# Patient Record
Sex: MALE | Race: WHITE
[De-identification: names, ages, dates, MRNs, and addresses within clinical notes are randomized per-mention and may not be internally consistent; named-entity substitution may affect disease eponyms.]

---

## 2017-06-07 ENCOUNTER — HOSPITAL ENCOUNTER (EMERGENCY)
Dept: HOSPITAL 23 - SED | Age: 47
Discharge: HOME | End: 2017-06-07
Payer: COMMERCIAL
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
## 2017-06-07 DIAGNOSIS — S70.01XA: Primary | ICD-10-CM

## 2017-06-07 DIAGNOSIS — F17.200: ICD-10-CM
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
## 2017-06-07 DIAGNOSIS — S20.219A: ICD-10-CM

## 2017-12-11 ENCOUNTER — LAB REQUISITION (OUTPATIENT)
Dept: LAB | Facility: OTHER | Age: 47
End: 2017-12-11

## 2017-12-11 ENCOUNTER — TRANSCRIBE ORDERS (OUTPATIENT)
Dept: CARDIOLOGY | Facility: CLINIC | Age: 47
End: 2017-12-11

## 2017-12-11 DIAGNOSIS — Z00.00 ROUTINE GENERAL MEDICAL EXAMINATION AT A HEALTH CARE FACILITY: ICD-10-CM

## 2017-12-11 DIAGNOSIS — Z00.00 PHYSICAL EXAM: Primary | ICD-10-CM

## 2017-12-11 LAB
ALBUMIN SERPL-MCNC: 4.4 G/DL (ref 3.5–5.2)
ALP SERPL-CCNC: 74 U/L (ref 39–117)
ALT SERPL W P-5'-P-CCNC: 30 U/L (ref 1–41)
AST SERPL-CCNC: 20 U/L (ref 1–40)
BASOPHILS # BLD AUTO: 0.01 10*3/MM3 (ref 0–0.2)
BASOPHILS NFR BLD AUTO: 0.1 % (ref 0–1.5)
BILIRUB CONJ SERPL-MCNC: <0.2 MG/DL (ref 0–0.3)
BILIRUB SERPL-MCNC: 1.4 MG/DL (ref 0.1–1.2)
BILIRUB UR QL STRIP: NEGATIVE
BUN BLD-MCNC: 15 MG/DL (ref 6–20)
CALCIUM SPEC-SCNC: 9.6 MG/DL (ref 8.6–10.5)
CHLORIDE SERPL-SCNC: 101 MMOL/L (ref 98–107)
CHOLEST SERPL-MCNC: 183 MG/DL (ref 0–200)
CLARITY UR: CLEAR
CO2 SERPL-SCNC: 23.4 MMOL/L (ref 22–29)
COLOR UR: ABNORMAL
CREAT BLD-MCNC: 1.05 MG/DL (ref 0.76–1.27)
DEPRECATED RDW RBC AUTO: 41.9 FL (ref 37–54)
EOSINOPHIL # BLD AUTO: 0.16 10*3/MM3 (ref 0–0.7)
EOSINOPHIL NFR BLD AUTO: 1.8 % (ref 0.3–6.2)
ERYTHROCYTE [DISTWIDTH] IN BLOOD BY AUTOMATED COUNT: 13.6 % (ref 11.5–14.5)
GFR SERPL CREATININE-BSD FRML MDRD: 75 ML/MIN/1.73
GFR SERPL CREATININE-BSD FRML MDRD: 91 ML/MIN/1.73
GGT SERPL-CCNC: 20 U/L (ref 8–61)
GLUCOSE BLD-MCNC: 104 MG/DL (ref 65–99)
GLUCOSE UR STRIP-MCNC: NEGATIVE MG/DL
HCT VFR BLD AUTO: 53.3 % (ref 40.4–52.2)
HDLC SERPL-MCNC: 36 MG/DL (ref 40–60)
HGB BLD-MCNC: 17.5 G/DL (ref 13.7–17.6)
HGB UR QL STRIP.AUTO: NEGATIVE
IMM GRANULOCYTES # BLD: 0.03 10*3/MM3 (ref 0–0.03)
IMM GRANULOCYTES NFR BLD: 0.3 % (ref 0–0.5)
IRON 24H UR-MRATE: 89 MCG/DL (ref 59–158)
KETONES UR QL STRIP: NEGATIVE
LDH SERPL-CCNC: 174 U/L (ref 135–225)
LDLC SERPL CALC-MCNC: 95 MG/DL (ref 0–100)
LDLC/HDLC SERPL: 2.63 {RATIO}
LEUKOCYTE ESTERASE UR QL STRIP.AUTO: NEGATIVE
LYMPHOCYTES # BLD AUTO: 2.56 10*3/MM3 (ref 0.9–4.8)
LYMPHOCYTES NFR BLD AUTO: 28.4 % (ref 19.6–45.3)
MCH RBC QN AUTO: 28.4 PG (ref 27–32.7)
MCHC RBC AUTO-ENTMCNC: 32.8 G/DL (ref 32.6–36.4)
MCV RBC AUTO: 86.5 FL (ref 79.8–96.2)
MONOCYTES # BLD AUTO: 0.79 10*3/MM3 (ref 0.2–1.2)
MONOCYTES NFR BLD AUTO: 8.8 % (ref 5–12)
NEUTROPHILS # BLD AUTO: 5.46 10*3/MM3 (ref 1.9–8.1)
NEUTROPHILS NFR BLD AUTO: 60.6 % (ref 42.7–76)
NITRITE UR QL STRIP: NEGATIVE
PH UR STRIP.AUTO: 6 [PH] (ref 5–8)
PHOSPHATE SERPL-MCNC: 3.1 MG/DL (ref 2.5–4.5)
PLATELET # BLD AUTO: 218 10*3/MM3 (ref 140–500)
PMV BLD AUTO: 10.8 FL (ref 6–12)
POTASSIUM BLD-SCNC: 4.3 MMOL/L (ref 3.5–5.2)
PROT SERPL-MCNC: 8 G/DL (ref 6–8.5)
PROT UR QL STRIP: NEGATIVE
RBC # BLD AUTO: 6.16 10*6/MM3 (ref 4.6–6)
SODIUM BLD-SCNC: 139 MMOL/L (ref 136–145)
SP GR UR STRIP: 1.02 (ref 1–1.03)
TRIGL SERPL-MCNC: 261 MG/DL (ref 0–150)
URATE SERPL-MCNC: 6.9 MG/DL (ref 3.4–7)
UROBILINOGEN UR QL STRIP: ABNORMAL
VLDLC SERPL-MCNC: 52.2 MG/DL (ref 5–40)
WBC NRBC COR # BLD: 9.01 10*3/MM3 (ref 4.5–10.7)

## 2017-12-11 PROCEDURE — 86481 TB AG RESPONSE T-CELL SUSP: CPT | Performed by: NURSE PRACTITIONER

## 2017-12-11 PROCEDURE — 81003 URINALYSIS AUTO W/O SCOPE: CPT | Performed by: NURSE PRACTITIONER

## 2017-12-11 PROCEDURE — 80061 LIPID PANEL: CPT | Performed by: NURSE PRACTITIONER

## 2017-12-11 PROCEDURE — 80053 COMPREHEN METABOLIC PANEL: CPT | Performed by: NURSE PRACTITIONER

## 2017-12-11 PROCEDURE — 85025 COMPLETE CBC W/AUTO DIFF WBC: CPT | Performed by: NURSE PRACTITIONER

## 2017-12-13 LAB
TSPOT INTERPRETATION: NEGATIVE
TSPOT NIL CONTROL: 0
TSPOT PANEL A: 0
TSPOT PANEL B: 0
TSPOT POS CONTROL: 174

## 2017-12-18 ENCOUNTER — HOSPITAL ENCOUNTER (OUTPATIENT)
Dept: CARDIOLOGY | Facility: HOSPITAL | Age: 47
Discharge: HOME | End: 2017-12-18

## 2017-12-18 DIAGNOSIS — Z00.00 PHYSICAL EXAM: ICD-10-CM

## 2017-12-18 LAB
BH CV STRESS BP STAGE 1: NORMAL
BH CV STRESS BP STAGE 2: NORMAL
BH CV STRESS BP STAGE 3: NORMAL
BH CV STRESS DURATION MIN STAGE 1: 3
BH CV STRESS DURATION MIN STAGE 2: 3
BH CV STRESS DURATION MIN STAGE 3: 3
BH CV STRESS DURATION SEC STAGE 1: 0
BH CV STRESS DURATION SEC STAGE 2: 0
BH CV STRESS DURATION SEC STAGE 3: 0
BH CV STRESS GRADE STAGE 1: 10
BH CV STRESS GRADE STAGE 2: 12
BH CV STRESS GRADE STAGE 3: 14
BH CV STRESS HR STAGE 1: 113
BH CV STRESS HR STAGE 2: 126
BH CV STRESS HR STAGE 3: 148
BH CV STRESS METS STAGE 1: 5
BH CV STRESS METS STAGE 2: 7.5
BH CV STRESS METS STAGE 3: 10
BH CV STRESS PROTOCOL 1: NORMAL
BH CV STRESS RECOVERY BP: NORMAL MMHG
BH CV STRESS RECOVERY HR: 98 BPM
BH CV STRESS SPEED STAGE 1: 1.7
BH CV STRESS SPEED STAGE 2: 2.5
BH CV STRESS SPEED STAGE 3: 3.4
BH CV STRESS STAGE 1: 1
BH CV STRESS STAGE 2: 2
BH CV STRESS STAGE 3: 3
MAXIMAL PREDICTED HEART RATE: 171 BPM
PERCENT MAX PREDICTED HR: 86.55 %
STRESS BASELINE BP: NORMAL MMHG
STRESS BASELINE HR: 86 BPM
STRESS PERCENT HR: 102 %
STRESS POST ESTIMATED WORKLOAD: 10 METS
STRESS POST EXERCISE DUR MIN: 9 MIN
STRESS POST EXERCISE DUR SEC: 0 SEC
STRESS POST PEAK BP: NORMAL MMHG
STRESS POST PEAK HR: 148 BPM
STRESS TARGET HR: 145 BPM

## 2017-12-18 PROCEDURE — 93017 CV STRESS TEST TRACING ONLY: CPT

## 2017-12-18 PROCEDURE — 93018 CV STRESS TEST I&R ONLY: CPT | Performed by: INTERNAL MEDICINE

## 2017-12-18 PROCEDURE — 93016 CV STRESS TEST SUPVJ ONLY: CPT | Performed by: INTERNAL MEDICINE

## 2018-01-09 ENCOUNTER — OFFICE VISIT (OUTPATIENT)
Dept: CARDIOLOGY | Facility: CLINIC | Age: 48
End: 2018-01-09

## 2018-01-09 VITALS
HEART RATE: 66 BPM | HEIGHT: 72 IN | SYSTOLIC BLOOD PRESSURE: 130 MMHG | WEIGHT: 277 LBS | BODY MASS INDEX: 37.52 KG/M2 | DIASTOLIC BLOOD PRESSURE: 82 MMHG

## 2018-01-09 DIAGNOSIS — I49.3 PVC'S (PREMATURE VENTRICULAR CONTRACTIONS): Primary | ICD-10-CM

## 2018-01-09 PROCEDURE — 99203 OFFICE O/P NEW LOW 30 MIN: CPT | Performed by: INTERNAL MEDICINE

## 2018-01-09 PROCEDURE — 93000 ELECTROCARDIOGRAM COMPLETE: CPT | Performed by: INTERNAL MEDICINE

## 2018-01-09 NOTE — PROGRESS NOTES
Subjective:     Encounter Date:01/09/2018      Patient ID: Jaime Lyle is a 47 y.o. male.    Chief Complaint:  History of Present Illness    Dear Dr. Bird,    I had the pleasure of seeing this patient in the office today for evaluation consultation.  I appreciate the you sent him to see us.    As you know he is a pleasant gentleman whom you arrange for a stress test as required for his duties as a .  He had a stress test performed December 18, 2017.  He comes in for follow-up.    Patient is typically followed with Dr. Price.  He sees me today as Dr. Price was not available.  He was last seen in October 2014 for multiple premature ventricular ectopic beats that were present at rest and also noted when he performed his treadmill in 2014.  These were asymptomatic.  No treatment was felt to be indicated.  It was recommended that he decrease the amount of caffeinated soft drinks that he drinks.  There is a discussion about possible sleep study, but it was subsequently decided that this was not necessary.    Patient denies any cardiac complaints.This patient denies any chest pain, pressure, tightness, squeezing, or heartburn.  This patient has not experienced any feeling of palpitations, tachycardia or heart racing and no presyncope or syncope.  There has not been any problems with dizziness or lightheadedness.  There has not been any orthopnea or PND, and no problems with lower extremity edema.  This patient denies any shortness of breath at rest or with activity and has not had any wheezing.  This patient has not had any problems with unexplained nausea or vomiting. The patient has continued to perform daily activities of living without any specific problem or change in the level of activity.  This patient has not been recently hospitalized for any reason.    This patient has no known cardiac history.  This patient has no history of coronary artery disease, congestive heart failure, rheumatic  fever, rheumatic heart disease, congenital heart disease or heart murmur.  This patient has never required invasive cardiovascular evaluation.    On his exercise treadmill test he walked for 9 minutes on a Patric protocol.  Maximum heart rate was 86% of the predicted maximal heart rate and he had no symptoms.  Frequent PVCs were seen.  This is unchanged from his prior stress test in 2014.    The following portions of the patient's history were reviewed and updated as appropriate: allergies, current medications, past family history, past medical history, past social history, past surgical history and problem list.    Past Medical History:   Diagnosis Date   • Acid reflux    • Community acquired pneumonia    • Cough    • Fatigue    • Mild mitral regurgitation    • STEVE (obstructive sleep apnea)    • Pulmonic regurgitation     trace   • PVC's (premature ventricular contractions)    • Tricuspid regurgitation     trace       Past Surgical History:   Procedure Laterality Date   • LAPAROSCOPIC GASTRIC BANDING     • ROTATOR CUFF REPAIR         Social History     Social History   • Marital status:      Spouse name: N/A   • Number of children: N/A   • Years of education: N/A     Occupational History   • Not on file.     Social History Main Topics   • Smoking status: Light Tobacco Smoker   • Smokeless tobacco: Not on file   • Alcohol use Yes      Comment: caffeine use   • Drug use: Not on file   • Sexual activity: Not on file     Other Topics Concern   • Not on file     Social History Narrative       Review of Systems   Constitution: Negative for chills, decreased appetite, fever and night sweats.   HENT: Negative for ear discharge, ear pain, hearing loss, nosebleeds and sore throat.    Eyes: Negative for blurred vision, double vision and pain.   Cardiovascular: Negative for cyanosis.   Respiratory: Negative for hemoptysis and sputum production.    Endocrine: Negative for cold intolerance and heat intolerance.  "  Hematologic/Lymphatic: Negative for adenopathy.   Skin: Negative for dry skin, itching, nail changes, rash and suspicious lesions.   Musculoskeletal: Negative for arthritis, gout, muscle cramps, muscle weakness, myalgias and neck pain.   Gastrointestinal: Negative for anorexia, bowel incontinence, constipation, diarrhea, dysphagia, hematemesis and jaundice.   Genitourinary: Negative for bladder incontinence, dysuria, flank pain, frequency, hematuria and nocturia.   Neurological: Negative for focal weakness, numbness, paresthesias and seizures.   Psychiatric/Behavioral: Negative for altered mental status, hallucinations, hypervigilance, suicidal ideas and thoughts of violence.   Allergic/Immunologic: Negative for persistent infections.         ECG 12 Lead  Date/Time: 1/9/2018 9:56 AM  Performed by: BRYAN FERNANDEZ III.  Authorized by: BRYAN FERNANDEZ III   Comparison: compared with previous ECG   Similar to previous ECG  Rhythm: sinus rhythm  Ectopy: PVCs  Rate: normal  Conduction: conduction normal  ST Segments: ST segments normal  T Waves: T waves normal  QRS axis: normal  Other: no other findings  Clinical impression: non-specific ECG               Objective:     Vitals:    01/09/18 0901   BP: 130/82   Pulse: 66   Weight: 126 kg (277 lb)   Height: 182.9 cm (72\")         Physical Exam   Constitutional: He is oriented to person, place, and time. He appears well-developed and well-nourished. No distress.   HENT:   Head: Normocephalic and atraumatic.   Nose: Nose normal.   Mouth/Throat: Oropharynx is clear and moist.   Eyes: Conjunctivae and EOM are normal. Pupils are equal, round, and reactive to light. Right eye exhibits no discharge. Left eye exhibits no discharge.   Neck: Normal range of motion. Neck supple. No tracheal deviation present. No thyromegaly present.   Cardiovascular: Normal rate, regular rhythm, S1 normal, S2 normal, normal heart sounds and normal pulses.  Exam reveals no S3.    Pulmonary/Chest: " Effort normal and breath sounds normal. No stridor. No respiratory distress. He exhibits no tenderness.   Abdominal: Soft. Bowel sounds are normal. He exhibits no distension and no mass. There is no tenderness. There is no rebound and no guarding.   Musculoskeletal: Normal range of motion. He exhibits no tenderness or deformity.   Lymphadenopathy:     He has no cervical adenopathy.   Neurological: He is alert and oriented to person, place, and time. He has normal reflexes.   Skin: Skin is warm and dry. No rash noted. He is not diaphoretic. No erythema.   Psychiatric: He has a normal mood and affect. Thought content normal.       Lab Review:             Performed        Assessment:          Diagnosis Plan   1. PVC's (premature ventricular contractions)  ECG 12 Lead          Plan:       This patient has chronic asymptomatic PVCs.  His stress test now looks no different than his one back in 2014.  He does not meet any indication for further evaluation or treatment.  Additionally, the patient is appropriate to continue to perform his duties as a  from a cardiac standpoint.    Thank you very much for allowing us to participate in the care of this pleasant patient.  Please don't hesitate to call if I can be of assistance in any way.    No current outpatient prescriptions on file.

## 2018-08-20 ENCOUNTER — LAB (OUTPATIENT)
Dept: LAB | Facility: HOSPITAL | Age: 48
End: 2018-08-20

## 2018-08-20 ENCOUNTER — OFFICE VISIT (OUTPATIENT)
Dept: CARDIOLOGY | Facility: CLINIC | Age: 48
End: 2018-08-20

## 2018-08-20 ENCOUNTER — TELEPHONE (OUTPATIENT)
Dept: CARDIOLOGY | Facility: CLINIC | Age: 48
End: 2018-08-20

## 2018-08-20 VITALS
BODY MASS INDEX: 37.17 KG/M2 | WEIGHT: 274.4 LBS | DIASTOLIC BLOOD PRESSURE: 84 MMHG | HEIGHT: 72 IN | HEART RATE: 59 BPM | SYSTOLIC BLOOD PRESSURE: 120 MMHG

## 2018-08-20 DIAGNOSIS — R00.2 PALPITATIONS: Primary | ICD-10-CM

## 2018-08-20 DIAGNOSIS — I49.3 PVC (PREMATURE VENTRICULAR CONTRACTION): ICD-10-CM

## 2018-08-20 DIAGNOSIS — F17.290 CIGAR SMOKER: ICD-10-CM

## 2018-08-20 DIAGNOSIS — R06.83 SNORING: ICD-10-CM

## 2018-08-20 DIAGNOSIS — E66.9 OBESITY (BMI 35.0-39.9 WITHOUT COMORBIDITY): ICD-10-CM

## 2018-08-20 DIAGNOSIS — R00.2 PALPITATIONS: ICD-10-CM

## 2018-08-20 LAB
ALBUMIN SERPL-MCNC: 4.1 G/DL (ref 3.5–5.2)
ALBUMIN/GLOB SERPL: 1.2 G/DL
ALP SERPL-CCNC: 69 U/L (ref 39–117)
ALT SERPL W P-5'-P-CCNC: 24 U/L (ref 1–41)
ANION GAP SERPL CALCULATED.3IONS-SCNC: 12 MMOL/L
AST SERPL-CCNC: 15 U/L (ref 1–40)
BASOPHILS # BLD AUTO: 0.02 10*3/MM3 (ref 0–0.2)
BASOPHILS NFR BLD AUTO: 0.2 % (ref 0–1.5)
BILIRUB SERPL-MCNC: 0.6 MG/DL (ref 0.1–1.2)
BUN BLD-MCNC: 13 MG/DL (ref 6–20)
BUN/CREAT SERPL: 13.8 (ref 7–25)
CALCIUM SPEC-SCNC: 9.7 MG/DL (ref 8.6–10.5)
CHLORIDE SERPL-SCNC: 105 MMOL/L (ref 98–107)
CO2 SERPL-SCNC: 23 MMOL/L (ref 22–29)
CREAT BLD-MCNC: 0.94 MG/DL (ref 0.76–1.27)
DEPRECATED RDW RBC AUTO: 41.2 FL (ref 37–54)
EOSINOPHIL # BLD AUTO: 0.17 10*3/MM3 (ref 0–0.7)
EOSINOPHIL NFR BLD AUTO: 2 % (ref 0.3–6.2)
ERYTHROCYTE [DISTWIDTH] IN BLOOD BY AUTOMATED COUNT: 13.2 % (ref 11.5–14.5)
GFR SERPL CREATININE-BSD FRML MDRD: 85 ML/MIN/1.73
GLOBULIN UR ELPH-MCNC: 3.3 GM/DL
GLUCOSE BLD-MCNC: 98 MG/DL (ref 65–99)
HCT VFR BLD AUTO: 51.3 % (ref 40.4–52.2)
HGB BLD-MCNC: 16.6 G/DL (ref 13.7–17.6)
IMM GRANULOCYTES # BLD: 0.05 10*3/MM3 (ref 0–0.03)
IMM GRANULOCYTES NFR BLD: 0.6 % (ref 0–0.5)
LYMPHOCYTES # BLD AUTO: 2.92 10*3/MM3 (ref 0.9–4.8)
LYMPHOCYTES NFR BLD AUTO: 34.4 % (ref 19.6–45.3)
MAGNESIUM SERPL-MCNC: 2.4 MG/DL (ref 1.6–2.6)
MCH RBC QN AUTO: 27.8 PG (ref 27–32.7)
MCHC RBC AUTO-ENTMCNC: 32.4 G/DL (ref 32.6–36.4)
MCV RBC AUTO: 85.8 FL (ref 79.8–96.2)
MONOCYTES # BLD AUTO: 0.64 10*3/MM3 (ref 0.2–1.2)
MONOCYTES NFR BLD AUTO: 7.5 % (ref 5–12)
NEUTROPHILS # BLD AUTO: 4.68 10*3/MM3 (ref 1.9–8.1)
NEUTROPHILS NFR BLD AUTO: 55.3 % (ref 42.7–76)
PLATELET # BLD AUTO: 216 10*3/MM3 (ref 140–500)
PMV BLD AUTO: 9.9 FL (ref 6–12)
POTASSIUM BLD-SCNC: 4.5 MMOL/L (ref 3.5–5.2)
PROT SERPL-MCNC: 7.4 G/DL (ref 6–8.5)
RBC # BLD AUTO: 5.98 10*6/MM3 (ref 4.6–6)
SODIUM BLD-SCNC: 140 MMOL/L (ref 136–145)
T4 FREE SERPL-MCNC: 1.38 NG/DL (ref 0.93–1.7)
TSH SERPL DL<=0.05 MIU/L-ACNC: 1.79 MIU/ML (ref 0.27–4.2)
WBC NRBC COR # BLD: 8.48 10*3/MM3 (ref 4.5–10.7)

## 2018-08-20 PROCEDURE — 84443 ASSAY THYROID STIM HORMONE: CPT

## 2018-08-20 PROCEDURE — 36415 COLL VENOUS BLD VENIPUNCTURE: CPT

## 2018-08-20 PROCEDURE — 83735 ASSAY OF MAGNESIUM: CPT

## 2018-08-20 PROCEDURE — 84439 ASSAY OF FREE THYROXINE: CPT

## 2018-08-20 PROCEDURE — 85025 COMPLETE CBC W/AUTO DIFF WBC: CPT

## 2018-08-20 PROCEDURE — 99214 OFFICE O/P EST MOD 30 MIN: CPT | Performed by: NURSE PRACTITIONER

## 2018-08-20 PROCEDURE — 93000 ELECTROCARDIOGRAM COMPLETE: CPT | Performed by: NURSE PRACTITIONER

## 2018-08-20 PROCEDURE — 80053 COMPREHEN METABOLIC PANEL: CPT

## 2018-08-20 NOTE — TELEPHONE ENCOUNTER
TSH, free T4, magnesium, CMP are normal.  CBC showed normal hemoglobin, hematocrit, WBC, RBC, platelet.     Patient informed & verbalizes understanding.

## 2018-08-20 NOTE — PROGRESS NOTES
Date of Office Visit: 2018  Encounter Provider: RIKKI Mehta  Place of Service: [facility]  Patient Name: Jaime Lyle  :1970    Chief Complaint   Patient presents with   • Palpitations   :     HPI: Jaime Lyle is a 47 y.o. male who presents today for cardiac follow up. He is a new patient to me and his previous records have been reviewed.  He has a past history of palpitations, PVCs, obesity, and obstructive sleep apnea.  He denies a history of coronary artery disease, heart failure, rheumatic fever, diabetes, hypertension, or hyperlipidemia.    He is an established patient of Dr. Issac Price and was last seen in the office by Dr. Vince Oconnell because Dr. Price was out that day.  He was initially diagnosed with PVCs in 2014 during treadmill stress test and he was asymptomatic.  He was a  so he does have stress test completed and his last one was in 2017. The treadmill showed no evidence of myocardial ischemia although he did have frequent monofocal premature ventricular contractions, bigeminy, and couplets during and after exercise.    Mr. Lyle presents today with a chief concern of worsening palpitations over the past 3 weeks.  His palpitations occur daily and he describes as skipped heartbeats with accompanying fatigue, shortness of breath, and dizziness.  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  He drinks approximately 3 sodas per day, feels that he keeps himself well-hydrated with water, denies a history of thyroid disease, rarely uses a decongestant, and drinks alcohol once every 2 weeks. He denies any increased stress in his personal life.  He does smoke a cigar once monthly.  He does experience occasional shortness of breath, cough, and snoring.  He has not been tested for sleep apnea in over 10 years.  He denies chest  pain, PND, orthopnea, or syncope.  His wife states that he does have occasional lower extremity edema if he stands on his feet for long time.  His blood pressure and heart rate are both normal.    The following portion of the patient's history were reviewed and updated as appropriate: past medical history, past surgical history, past social history, past family history, allergies, current medications, and problem list.    Past Medical History:   Diagnosis Date   • Acid reflux    • Community acquired pneumonia    • Mild mitral regurgitation    • Obesity (BMI 35.0-39.9 without comorbidity)    • STEVE (obstructive sleep apnea)    • Pulmonic regurgitation     trace   • PVC's (premature ventricular contractions)    • Tricuspid regurgitation     trace       Past Surgical History:   Procedure Laterality Date   • LAPAROSCOPIC GASTRIC BANDING     • ROTATOR CUFF REPAIR         Social History     Social History   • Marital status:      Spouse name: N/A   • Number of children: N/A   • Years of education: N/A     Occupational History   • Not on file.     Social History Main Topics   • Smoking status: Light Tobacco Smoker     Types: Cigars   • Smokeless tobacco: Never Used      Comment: 1 cigar monthly   • Alcohol use Yes      Comment: caffeine use   • Drug use: Unknown   • Sexual activity: Not on file       Family History   Problem Relation Age of Onset   • Heart disease Mother 50   • Diabetes Father    • Sudden death Maternal Uncle 52   • Stroke Maternal Grandmother 50   • Diabetes Paternal Grandmother    • Diabetes Paternal Grandfather    • Hypertension Other    • Heart disease Other         ischemic   • Diabetes Other        Review of Systems   Constitution: Negative for chills, diaphoresis, fever, malaise/fatigue, night sweats, weight gain and weight loss.   HENT: Negative for hearing loss, nosebleeds, sore throat and tinnitus.    Eyes: Negative for blurred vision, double vision, pain and visual disturbance.  "  Cardiovascular: Positive for palpitations. Negative for chest pain, claudication, cyanosis, irregular heartbeat, leg swelling, near-syncope, orthopnea, paroxysmal nocturnal dyspnea and syncope.   Respiratory: Positive for cough and snoring. Negative for hemoptysis, shortness of breath and wheezing.    Endocrine: Negative for cold intolerance, heat intolerance and polyuria.   Hematologic/Lymphatic: Negative for bleeding problem. Does not bruise/bleed easily.   Skin: Negative for color change, dry skin, flushing and itching.   Musculoskeletal: Negative for falls, joint pain, joint swelling, muscle cramps, muscle weakness and myalgias.   Gastrointestinal: Negative for abdominal pain, constipation, heartburn, melena, nausea and vomiting.   Genitourinary: Negative for dysuria and hematuria.   Neurological: Positive for dizziness. Negative for excessive daytime sleepiness, light-headedness, loss of balance, numbness, paresthesias, seizures and vertigo.   Psychiatric/Behavioral: Negative for altered mental status, depression, memory loss and substance abuse. The patient does not have insomnia and is not nervous/anxious.    Allergic/Immunologic: Negative for environmental allergies.       No Known Allergies      Current Outpatient Prescriptions:   •  Aspirin-Acetaminophen-Caffeine (GOODY HEADACHE PO), Take  by mouth As Needed., Disp: , Rfl:       Objective:     Vitals:    08/20/18 0842   BP: 120/84   Pulse: 59   Weight: 124 kg (274 lb 6.4 oz)   Height: 182.9 cm (72\")     Body mass index is 37.22 kg/m².    PHYSICAL EXAM:    Vitals Reviewed.   General Appearance: No acute distress, well developed and well nourished. Obese.   Eyes: Conjunctiva and lids: No erythema, swelling, or discharge. Sclera non-icteric.   HENT: Atraumatic, normocephalic. External eyes, ears, and nose normal. No hearing loss noted. Mucous membranes normal. Lips not cyanotic. Neck supple with no tenderness.  Respiratory: No signs of respiratory distress. " Respiration rhythm and depth normal.   Clear to auscultation. No rales, crackles, rhonchi, or wheezing auscultated.   Cardiovascular:  Jugular Venous Pressure: Normal  Heart Rate and Rhythm: Normal rhythm with ectopy.  Heart Sounds: Normal S1 and S2. No S3 or S4 noted.  Murmurs: No murmurs noted. No rubs, thrills, or gallops.   Arterial Pulses: Carotid pulses normal. No carotid bruit noted. Posterior tibialis and dorsalis pedis pulses normal.   Lower Extremities: No edema noted.  Gastrointestinal:  Abdomen soft, non-distended, non-tender. Normal bowel sounds. No hepatomegaly.   Musculoskeletal: Normal movement of extremities  Skin and Nails: General appearance normal. No pallor, cyanosis, diaphoresis. Skin temperature normal. No clubbing of fingernails.   Psychiatric: Patient alert and oriented to person, place, and time. Speech and behavior appropriate. Normal mood and affect.       ECG 12 Lead  Date/Time: 8/20/2018 8:36 AM  Performed by: RONAK RIVERA  Authorized by: RONAK RIVERA   Comparison: compared with previous ECG from 1/9/2018  Similar to previous ECG  Rhythm: sinus rhythm  Rate: bradycardic  BPM: 59  Conduction: conduction normal  ST Segments: ST segments normal  QRS axis: normal  Clinical impression: abnormal ECG  Comments: T wave flattening               Assessment:       Diagnosis Plan   1. Palpitations  Holter Monitor - 24 Hour    Comprehensive Metabolic Panel    CBC Auto Differential    Magnesium    TSH    T4, Free    Ambulatory Referral to Sleep Medicine   2. PVC (premature ventricular contraction)  Holter Monitor - 24 Hour    Comprehensive Metabolic Panel    CBC Auto Differential    Magnesium    TSH    T4, Free    Ambulatory Referral to Sleep Medicine   3. Obesity (BMI 35.0-39.9 without comorbidity)     4. Snoring  Ambulatory Referral to Sleep Medicine   5. Cigar smoker            Plan:       1.  Palpitations: He has a history of frequent PVCs is been asymptomatic in the past.  Over the past  3 weeks she's been symptomatic with palpitations accompanied with fatigue, shortness of breath, and dizziness.  I auscultated his heart and heard extra heartbeats, but they were not noted on the EKG.  He is going to wear a 24-hour Holter monitor to identify exactly what he is feeling and quantify the amount of possible PVCs.  We did talk about lifestyle modifications such as regular exercise, weight loss, and decreasing his caffeinated beverage intake.  We will check a CBC, CMP, magnesium, TSH, and free T4.  We did discuss the possibility of starting a low-dose beta blocker if his palpitations continue.    2.  Snoring: He does snore at nighttime and I'm suspicious of possible sleep apnea.  I placed a referral to sleep medicine.    3.  Obesity: He has had gastric bypass surgery in the past and last 140 pounds.  His BMI today is 37.2.  I did explain how weight loss and regular exercise can continue to help the PVCs.    4.  Cigar smoking: The patient benefit from same from cigar smoking.    5.  Further recommendations to follow after review of test results.    Addendum 10/2/2018: Patient has declined wearing the 24-hour Holter monitor as previously recommended.    As always, it has been a pleasure to participate in your patient's care.      Sincerely,         RIKKI Colin

## 2018-08-23 ENCOUNTER — APPOINTMENT (OUTPATIENT)
Dept: CARDIOLOGY | Facility: HOSPITAL | Age: 48
End: 2018-08-23

## 2018-09-06 ENCOUNTER — TELEPHONE (OUTPATIENT)
Dept: CARDIOLOGY | Facility: CLINIC | Age: 48
End: 2018-09-06

## 2018-09-06 NOTE — TELEPHONE ENCOUNTER
Please call patient to reschedule Holter monitor.  I spoke with him and he would like to have it done.  Thank you

## 2018-09-06 NOTE — TELEPHONE ENCOUNTER
----- Message from RIKKI Garay sent at 9/6/2018  5:10 PM EDT -----    Patient was both a no show and cancelled holter     ----- Message -----  From: Kim Rothman APRN  Sent: 8/29/2018   4:48 PM  To: RIKKI Garay      Patient rescheduled for 8/31    ----- Message -----  From: Kim Rothman APRN  Sent: 8/20/2018   9:44 AM  To: RIKKI Garay      Follow up on holter monitor 8/23

## 2018-09-07 NOTE — TELEPHONE ENCOUNTER
I s/w pt - he is currently away from his desk, once he gets back he is going to call me to schedule.    Thank you  Chino MAS

## 2018-09-18 ENCOUNTER — OFFICE VISIT (OUTPATIENT)
Dept: SLEEP MEDICINE | Facility: HOSPITAL | Age: 48
End: 2018-09-18
Attending: INTERNAL MEDICINE

## 2018-09-18 VITALS
HEART RATE: 55 BPM | HEIGHT: 72 IN | WEIGHT: 274 LBS | BODY MASS INDEX: 37.11 KG/M2 | SYSTOLIC BLOOD PRESSURE: 136 MMHG | OXYGEN SATURATION: 97 % | DIASTOLIC BLOOD PRESSURE: 79 MMHG

## 2018-09-18 DIAGNOSIS — I49.3 FREQUENT PVCS: ICD-10-CM

## 2018-09-18 DIAGNOSIS — G47.33 OSA (OBSTRUCTIVE SLEEP APNEA): ICD-10-CM

## 2018-09-18 DIAGNOSIS — E66.9 OBESITY (BMI 35.0-39.9 WITHOUT COMORBIDITY): ICD-10-CM

## 2018-09-18 DIAGNOSIS — R06.83 SNORING: Primary | ICD-10-CM

## 2018-09-18 PROCEDURE — G0463 HOSPITAL OUTPT CLINIC VISIT: HCPCS

## 2018-09-18 NOTE — PROGRESS NOTES
[facility]      Patient Care Team:  Provider, No Known as PCP - General    Referring Provider: Kim Rothman APRN    Chief complaint:   Referred for evaluation of sleep apnea due to frequent PVC    History of present illness:  This is a 48-year-old male patient with a diagnosis of frequent symptomatic PVC.  He follows with cardiology and was seen by Dr. Oconnell.  He is currently not receiving any medical treatment but was told to cut down on caffeine intake.  He was referred to us for evaluation of sleep apnea.    Patient stated that he had a sleep study back in  as workup for gastric banding.  He does not recall of the sleep test showed but he does not think that he was diagnosed with sleep apnea as he was not started on treatment.  We do not have access to this test.      He did endorse loud snoring which disturb his wife but does not awaken him at night however he stated that he sometimes wakes up coughing and choking and this occurs about once a week.  He also wakes up with a dry mouth.  No reports of witnessed apnea.  He described his sleep as somewhat restless with frequent leg jerking but denies symptoms of restless leg syndrome.    Sleep schedule:  -Bedtime: Midnight to 2 AM  -Sleep latency: 30-60 minutes  -Wake up time: 6:30 to 7 AM.  He feels tired on some days and rested on other days.  -Nocturnal awakenin-2 times because of nocturia.  No difficulties going back to sleep.  -Perceived sleep hours: 4-5    ESS: Total score: 4     He stated that both his mother other and father have sleep apnea and use CPAP.    Apparently, patient underwent gastric banding and ended up losing about 100 pounds over he stated that the band slipped off.  He stated that he tries to eat healthy diet and also tries to walk about 3 minus a day.    Review of Systems  Constitutional: No fever or chills. No changes in appetite.   ENMT: Nasal congestion but no postnasal drip,  hoarsness  Cardiovascular: No chest pain or legs swelling but  palpitation  Respiratory: No dyspnea or wheezing but cough  Gastrointestinal: No constipation, diarrhea, abdominal pain or acid reflux  Neurology: No headache, weakness, numbness but dizziness.   Musculoskeletal: Joints pain and swelling.   Psychiatry: No depression, anxiety or irritability.   Hem/Lymphatic: No swollen glands or easy bruising.  Integumentary: No rash.  Endocrinology: No excessive thirst, cold or warm intolerance.   Urinary: No dysuria, bloody urine or frequent urination.     History  Past Medical History:   Diagnosis Date   • Acid reflux    • Community acquired pneumonia    • Mild mitral regurgitation    • Obesity (BMI 35.0-39.9 without comorbidity)    • STEVE (obstructive sleep apnea)    • Pulmonic regurgitation     trace   • PVC's (premature ventricular contractions)    • Tricuspid regurgitation     trace   ,   Past Surgical History:   Procedure Laterality Date   • LAPAROSCOPIC GASTRIC BANDING     • ROTATOR CUFF REPAIR     ,   Family History   Problem Relation Age of Onset   • Heart disease Mother 50   • Diabetes Father    • Sudden death Maternal Uncle 52   • Stroke Maternal Grandmother 50   • Diabetes Paternal Grandmother    • Diabetes Paternal Grandfather    • Hypertension Other    • Heart disease Other         ischemic   • Diabetes Other    ,   Social History   Substance Use Topics   • Smoking status: Light Tobacco Smoker     Types: Cigars   • Smokeless tobacco: Never Used      Comment: 1 cigar monthly   • Alcohol use Yes      Comment: caffeine use    and Allergies:  Patient has no known allergies.    Medications:    Current Outpatient Prescriptions:   •  Aspirin-Acetaminophen-Caffeine (GOODY HEADACHE PO), Take  by mouth As Needed., Disp: , Rfl:       Vital Signs:  Vitals:    09/18/18 1449   BP: 136/79   BP Location: Left arm   Patient Position: Sitting   Cuff Size: Adult   Pulse: 55   SpO2: 97%   Weight: 124 kg (274 lb)   Height:  "182.9 cm (72\")     Body mass index is 37.16 kg/m².  Neck Circumference: 18 inches     Physical Exam:  Neck Circumference: 18 inches     Constitutional: Not in acute distress.  Eyes: Injected conjunctiva, EOMI.  ENMT: Palmer score 3. Mallampati score 3. No oral thrush. Tonsils grade . Large tongue.  Neck: Large. No thyromegaly.    Heart: Regular rhythm and rate, no murmur  Lungs: Good and equal air entry bilaterally. No crackles or wheezing.         Abdomen: Obese. Soft.  No tenderness  Extremities: No cyanosis, clubbing or pitting edema. Moves all extremities.  Neuro: Conscious, alert, oriented x3.   Psych: Appropriate mood and affect.    Integumentary: No rash  Lymphatic: No palpable cervical or supraclavicular lymph nodes.    Diagnostic data:  Echocardiogram on 11/5/20: EF = 66%.       Assessment:  Diagnoses and all orders for this visit:    Snoring    Obesity (BMI 35.0-39.9 without comorbidity)    Frequent PVCs        Plan:  Despite the lack of significant symptoms, patient remains at risk of obstructive sleep apnea due to his obesity and upper airway anatomy.    I explained the pathophysiology of sleep apnea, testing and therapy which mainly include CPAP and weight loss.  Patient is a good candidate for auto CPAP therapy.  I will obtain a home sleep apnea testing.      We discussed the association between obstructive sleep apnea and cardiovascular disease including arrhythmia and the beneficial effect of CPAP therapy and cardiovascular disease.    I counseled the patient for weight loss.  I informed him that losing weight he decrease the severity of sleep apnea in obvious need of CPAP therapy        Sean Sales MD  09/18/18  3:22 PM    This note was dictated utilizing Dragon dictation          "

## 2018-10-02 ENCOUNTER — TELEPHONE (OUTPATIENT)
Dept: CARDIOLOGY | Facility: CLINIC | Age: 48
End: 2018-10-02

## 2018-10-02 NOTE — TELEPHONE ENCOUNTER
----- Message from Chino Finch sent at 10/2/2018  8:35 AM EDT -----  I s/w patient, he does not want to proceed.     Thank you  Chino MAS  ----- Message -----  From: Kim Rothman APRN  Sent: 10/1/2018   4:06 PM  To: Chino Finch      Please call patient and schedule Holter monitor.  If he doesn't want to have it done, I will cancel the order.

## 2018-10-16 ENCOUNTER — HOSPITAL ENCOUNTER (OUTPATIENT)
Dept: SLEEP MEDICINE | Facility: HOSPITAL | Age: 48
Discharge: HOME | End: 2018-10-16
Attending: INTERNAL MEDICINE | Admitting: INTERNAL MEDICINE

## 2018-10-16 DIAGNOSIS — G47.33 OSA (OBSTRUCTIVE SLEEP APNEA): ICD-10-CM

## 2018-10-16 PROCEDURE — 95806 SLEEP STUDY UNATT&RESP EFFT: CPT

## 2018-10-29 ENCOUNTER — TELEPHONE (OUTPATIENT)
Dept: SLEEP MEDICINE | Facility: HOSPITAL | Age: 48
End: 2018-10-29

## 2018-10-29 NOTE — TELEPHONE ENCOUNTER
Pt called back to discuss sleep study results.  Patient voiced understanding of HST results.  Pt is currently out of town for next 2 weeks.  Pt will CB to make f/up appt with Dr. Sales. [place]

## 2019-01-15 ENCOUNTER — LAB REQUISITION (OUTPATIENT)
Dept: LAB | Facility: OTHER | Age: 49
End: 2019-01-15

## 2019-01-15 DIAGNOSIS — Z00.00 ROUTINE GENERAL MEDICAL EXAMINATION AT A HEALTH CARE FACILITY: ICD-10-CM

## 2019-01-15 LAB
ALBUMIN SERPL-MCNC: 4 G/DL (ref 3.5–5.2)
ALP SERPL-CCNC: 65 U/L (ref 39–117)
ALT SERPL W P-5'-P-CCNC: 21 U/L (ref 1–41)
AST SERPL-CCNC: 17 U/L (ref 1–40)
BASOPHILS # BLD AUTO: 0.02 10*3/MM3 (ref 0–0.2)
BASOPHILS NFR BLD AUTO: 0.2 % (ref 0–1.5)
BILIRUB CONJ SERPL-MCNC: <0.2 MG/DL (ref 0–0.3)
BILIRUB SERPL-MCNC: 0.8 MG/DL (ref 0.1–1.2)
BILIRUB UR QL STRIP: NEGATIVE
BUN BLD-MCNC: 12 MG/DL (ref 6–20)
CALCIUM SPEC-SCNC: 10 MG/DL (ref 8.6–10.5)
CHLORIDE SERPL-SCNC: 102 MMOL/L (ref 98–107)
CHOLEST SERPL-MCNC: 179 MG/DL (ref 0–200)
CLARITY UR: CLEAR
CO2 SERPL-SCNC: 24.6 MMOL/L (ref 22–29)
COLOR UR: YELLOW
CREAT BLD-MCNC: 1.07 MG/DL (ref 0.76–1.27)
DEPRECATED RDW RBC AUTO: 42.6 FL (ref 37–54)
EOSINOPHIL # BLD AUTO: 0.22 10*3/MM3 (ref 0–0.7)
EOSINOPHIL NFR BLD AUTO: 2.4 % (ref 0.3–6.2)
ERYTHROCYTE [DISTWIDTH] IN BLOOD BY AUTOMATED COUNT: 13.9 % (ref 11.5–14.5)
GFR SERPL CREATININE-BSD FRML MDRD: 73 ML/MIN/1.73
GGT SERPL-CCNC: 20 U/L (ref 8–61)
GLUCOSE BLD-MCNC: 99 MG/DL (ref 65–99)
GLUCOSE UR STRIP-MCNC: NEGATIVE MG/DL
HCT VFR BLD AUTO: 51.4 % (ref 40.4–52.2)
HDLC SERPL-MCNC: 35 MG/DL (ref 40–60)
HGB BLD-MCNC: 17.2 G/DL (ref 13.7–17.6)
HGB UR QL STRIP.AUTO: NEGATIVE
IMM GRANULOCYTES # BLD AUTO: 0.04 10*3/MM3 (ref 0–0.03)
IMM GRANULOCYTES NFR BLD AUTO: 0.4 % (ref 0–0.5)
IRON 24H UR-MRATE: 60 MCG/DL (ref 59–158)
KETONES UR QL STRIP: NEGATIVE
LDH SERPL-CCNC: 224 U/L (ref 135–225)
LDLC SERPL CALC-MCNC: 101 MG/DL (ref 0–100)
LDLC/HDLC SERPL: 2.88 {RATIO}
LEUKOCYTE ESTERASE UR QL STRIP.AUTO: NEGATIVE
LYMPHOCYTES # BLD AUTO: 3.23 10*3/MM3 (ref 0.9–4.8)
LYMPHOCYTES NFR BLD AUTO: 34.9 % (ref 19.6–45.3)
MCH RBC QN AUTO: 28.6 PG (ref 27–32.7)
MCHC RBC AUTO-ENTMCNC: 33.5 G/DL (ref 32.6–36.4)
MCV RBC AUTO: 85.5 FL (ref 79.8–96.2)
MONOCYTES # BLD AUTO: 0.51 10*3/MM3 (ref 0.2–1.2)
MONOCYTES NFR BLD AUTO: 5.5 % (ref 5–12)
NEUTROPHILS # BLD AUTO: 5.28 10*3/MM3 (ref 1.9–8.1)
NEUTROPHILS NFR BLD AUTO: 57 % (ref 42.7–76)
NITRITE UR QL STRIP: NEGATIVE
PH UR STRIP.AUTO: 5.5 [PH] (ref 5–8)
PHOSPHATE SERPL-MCNC: 3.2 MG/DL (ref 2.5–4.5)
PLATELET # BLD AUTO: 228 10*3/MM3 (ref 140–500)
PMV BLD AUTO: 10.9 FL (ref 6–12)
POTASSIUM BLD-SCNC: 4.3 MMOL/L (ref 3.5–5.2)
PROT SERPL-MCNC: 7.7 G/DL (ref 6–8.5)
PROT UR QL STRIP: NEGATIVE
RBC # BLD AUTO: 6.01 10*6/MM3 (ref 4.6–6)
SODIUM BLD-SCNC: 139 MMOL/L (ref 136–145)
SP GR UR STRIP: 1.02 (ref 1–1.03)
TRIGL SERPL-MCNC: 216 MG/DL (ref 0–150)
URATE SERPL-MCNC: 6.7 MG/DL (ref 3.4–7)
UROBILINOGEN UR QL STRIP: NORMAL
VLDLC SERPL-MCNC: 43.2 MG/DL (ref 5–40)
WBC NRBC COR # BLD: 9.26 10*3/MM3 (ref 4.5–10.7)

## 2019-01-15 PROCEDURE — 85025 COMPLETE CBC W/AUTO DIFF WBC: CPT | Performed by: PHYSICAL MEDICINE & REHABILITATION

## 2019-01-15 PROCEDURE — 81003 URINALYSIS AUTO W/O SCOPE: CPT | Performed by: PHYSICAL MEDICINE & REHABILITATION

## 2019-01-15 PROCEDURE — 80053 COMPREHEN METABOLIC PANEL: CPT | Performed by: PHYSICAL MEDICINE & REHABILITATION

## 2019-01-15 PROCEDURE — 80061 LIPID PANEL: CPT | Performed by: PHYSICAL MEDICINE & REHABILITATION

## 2019-01-15 PROCEDURE — 86481 TB AG RESPONSE T-CELL SUSP: CPT | Performed by: PHYSICAL MEDICINE & REHABILITATION

## 2019-01-17 LAB
TSPOT INTERPRETATION: NEGATIVE
TSPOT NIL CONTROL INTERPRETATION: NORMAL
TSPOT PANEL A: 0
TSPOT PANEL B: 0
TSPOT POS CONTROL INTERPRETATION: NORMAL

## 2019-05-23 ENCOUNTER — CONVERSION ENCOUNTER (OUTPATIENT)
Dept: OTHER | Facility: HOSPITAL | Age: 49
End: 2019-05-23

## 2019-05-31 ENCOUNTER — HOSPITAL ENCOUNTER (OUTPATIENT)
Dept: GENERAL RADIOLOGY | Facility: HOSPITAL | Age: 49
Discharge: HOME | End: 2019-05-31
Attending: FAMILY MEDICINE | Admitting: FAMILY MEDICINE

## 2019-06-04 VITALS
HEIGHT: 71 IN | BODY MASS INDEX: 38.22 KG/M2 | OXYGEN SATURATION: 96 % | SYSTOLIC BLOOD PRESSURE: 137 MMHG | HEART RATE: 62 BPM | DIASTOLIC BLOOD PRESSURE: 86 MMHG | WEIGHT: 273 LBS

## 2019-06-06 NOTE — PROGRESS NOTES
CC:  respiratory issues .    History of Present Illness:  Pt in w 3-4 month Hx  chronic cough and dyspnea. Non-smoker, but has Hx of significant GERD w Lap Band in place. ? Slipped.  Occ Dysphagia.      Vital Signs:    Patient Profile:    48 Years Old Male  Height:     71 inches  Weight:     273 pounds  BMI:        38.07     O2 Sat:     96 %  Temp:       98.2 degrees F oral  Pulse rate: 62 / minute  BP Sittin / 86  (left arm)    Cuff size:  large      Problems: Active problems were reviewed with the patient during this visit.  Medications: Medications were reviewed with the patient during this visit.  Allergies: Allergies were reviewed with the patient during this visit.  No Known Allergy.        Vitals Entered By: Karine Luna CMA (May 23, 2019 9:38 AM)      Active Medications (reviewed today):  GOODYS EXTRA STRENGTH 500-325-65 MG ORAL PACKET (ASPIRIN-ACETAMINOPHEN-CAFFEINE) prn  PEPCID COMPLETE -165 MG ORAL TABLET CHEWABLE (FAMOTIDINE-CA CARB-MAG HYDROX) prn    Current Allergies (reviewed today):  No known allergies          Risk Factors:     Smoked Tobacco Use:  Never smoker  Smokeless Tobacco Use:  Never  Passive smoke exposure:  no  Drug use:  no  HIV high-risk behavior:  no  Caffeine use:  3 drinks per day  Alcohol use:  yes     Type:  3-4 drinks per month   Exercise:  no  Seatbelt use:  100 %    Previous Tobacco Use: Signed On 2019  Smoked Tobacco Use:  Never smoker  Smokeless Tobacco Use:  Never  Passive smoke exposure:  no  Drug use:  no  HIV high-risk behavior:  no  Caffeine use:  3 drinks per day    Previous Alcohol Use: Signed On 2019  Alcohol use:  yes     Type:  3-4 drinks per month   Exercise:  no  Seatbelt use:  100 %        Review of Systems     General       Denies fever, chills, sweats, anorexia, fatigue, weakness, malaise, weight loss, weight gain and sleep disorder.       Chronic Obesity.    CV       Denies difficulty breathing at night, near fainting, chest pain  or discomfort, racing/skipping heart beats, fatigue, lightheadedness, shortness of breath with exertion, palpitations, swelling of hands or feet, difficulty breathing while lying down,   fainting, leg cramps with exertion, bluish discoloration of lips or nails, weight gain, leg cramps, leg pain, varicose veins, paroxysmal nocturnal dyspnea and bluish or purplish discoloration of hands/feet.    Resp       See HPI       Complains of cough and chest discomfort.    GI       See HPI       Denies excessive appetite, loss of appetite, indigestion, vomiting blood, nausea, vomiting, yellowish skin color, gas, abdominal pain, abdominal bloating, hemorrhoids, diarrhea, change in bowel habits, constipation, dark tarry stools, bloody stools,   abdominal mass, abdominal swelling, food intolerance, early satiety, stool incontinence, laxative use, odynophagia, painful defecation, need for antacids and blood after wiping.       Hx of GERD.           Denies dysuria, hematuria, discharge, urinary frequency, urinary hesitancy, nocturia, incontinence, genital sores, decreased libido, erectile dysfunction, Change in bladder habits, Change in urinary stream, Erection at night, Flank Pain, Testicular   Mass, Testicular Pain, Urgency, Retention, Kidney Stones, Difficulty Stopping Stream, Hx of STD and Recurrent UTI's.    Endo       Denies excessive hunger, cold intolerance, heat intolerance, excessive urination, excessive    thirst, weight change, appetite changes, excessive sweating, hair changes, hot flashes, libido change, change in body hair and Hypoglycemic episodes.    Heme       Denies enlarged lymph nodes, bleeding, skin discoloration, abnormal bruising, fevers and nose bleeds.      Physical Exam    General:      Mod Obesity, NAD.  Mouth:      no deformity or lesions with good dentition.  Oropharynx clear.  Neck:      no masses, thyromegaly, or abnormal cervical nodes.    Chest Wall:      no deformities or breast masses noted.     Lungs:      clear bilaterally to auscultation.    Heart:      non-displaced PMI, chest non-tender; regular rate and rhythm, S1, S2 without murmurs, rubs, or gallops  Abdomen:      Obese, soft, nontender, no bruits,  normal bowel sounds; no hepatosplenomegaly no ventral,umbilical hernias or masses noted.        Blood Pressure:  Today's BP: 137/86 mm Hg          Impression & Recommendations:    Problem # 1:  Dyspnea (ICD-786.09) (MBZ71-O83.00)  Assessment: Deteriorated    Orders:  XR CHEST 2 VIEWS (STANDARD (CPT-86423)      Problem # 2:  Cough chronic (ICD-786.2) (HKS81-B35)  Assessment: Unchanged    Orders:  XR CHEST 2 VIEWS (STANDARD (CPT-91577)  XR UGI AND SWALLOW WITH GASTROGRAFIN (CPT-37596)      Problem # 3:  GERD (gastroesophageal reflux disease) (ICD-530.81) (RWJ49-O73.9)  Assessment: Unchanged    Orders:  XR UGI AND SWALLOW WITH GASTROGRAFIN (CPT-68116)      Problem # 4:  Obesity (ICD-278.00) (WFI77-E05.9)  Assessment: Unchanged    Medications Added to Medication List This Visit:  1)  Omeprazole 40 Mg Oral Capsule Delayed Release (Omeprazole) .... Take one each evening.  2)  Mucinex D Max Strength Tablet Extended Release 12 Hour (Pseudoephedrine-guaifenesin xr12h-tab)  3)  Flonase Allergy Relief Suspension (Fluticasone propionate susp)  4)  Zyrtec Allergy 10 Mg Oral Tablet (Cetirizine hcl)  5)  Tagamet Hb Tablet (Cimetidine tabs) .... Once daily  6)  Stahist Ad 25-60 Mg Oral Tablet (Chlorcyclizine-pseudoephed) .... Prn          Patient Instructions:  1)  Will do CXR and UGI w BS.  F/U as indicated.  2)  Begin Omeprazole 20 mg q pm. Cont tagamet or Zantac in am.  3)  F/U w GI or Cardial Referral as indicated.    Medications:  OMEPRAZOLE 40 MG ORAL CAPSULE DELAYED RELEASE (OMEPRAZOLE) tAKE ONE EACH EVENING.  #30[Capsule] x 5      Entered and Authorized by:  Bar Lynne Jr, MD      Electronically signed by:   Bar Lynne Jr, MD on 05/23/2019      Method used:    Electronically to               [id number] (retail)              [address] [place]  [address]              Ph: [phone]              Fax: [phone]      Note to Pharmacy: Route: ORAL;       RxID:   [id number]                  Medication Administration    Orders Added:  1)  XR CHEST 2 VIEWS (STANDARD [CPT-18356]  2)  XR UGI AND SWALLOW WITH GASTROGRAFIN [CPT-30218]  3)  51090-Djt Vst-Est Level III [CPT-34667]  ]      Electronically signed by Bar Lynne Jr, MD on 05/27/2019 at 8:36 AM  ________________________________________________________________________       Disclaimer: Converted Note message may not contain all data elements that existed in the [employer] source system. Please see [employer] for the original note details.

## 2019-09-09 RX ORDER — OMEPRAZOLE 40 MG/1
CAPSULE, DELAYED RELEASE ORAL
Qty: 30 CAPSULE | Refills: 0 | Status: SHIPPED | OUTPATIENT
Start: 2019-09-09 | End: 2021-06-08

## 2020-01-24 ENCOUNTER — LAB REQUISITION (OUTPATIENT)
Dept: LAB | Facility: OTHER | Age: 50
End: 2020-01-24

## 2020-01-24 ENCOUNTER — TRANSCRIBE ORDERS (OUTPATIENT)
Dept: CARDIOLOGY | Facility: CLINIC | Age: 50
End: 2020-01-24

## 2020-01-24 DIAGNOSIS — Z00.00 PHYSICAL EXAM: Primary | ICD-10-CM

## 2020-01-24 DIAGNOSIS — Z00.00 ROUTINE GENERAL MEDICAL EXAMINATION AT A HEALTH CARE FACILITY: ICD-10-CM

## 2020-01-24 LAB
ALBUMIN SERPL-MCNC: 4.3 G/DL (ref 3.5–5.2)
ALBUMIN/GLOB SERPL: 1.7 G/DL
ALP SERPL-CCNC: 60 U/L (ref 39–117)
ALT SERPL W P-5'-P-CCNC: 13 U/L (ref 1–41)
AST SERPL-CCNC: 10 U/L (ref 1–40)
BASOPHILS # BLD AUTO: 0.04 10*3/MM3 (ref 0–0.2)
BASOPHILS NFR BLD AUTO: 0.3 % (ref 0–1.5)
BILIRUB CONJ SERPL-MCNC: <0.2 MG/DL (ref 0.2–0.3)
BILIRUB SERPL-MCNC: 0.6 MG/DL (ref 0.2–1.2)
BILIRUB UR QL STRIP: NEGATIVE
BUN BLD-MCNC: 25 MG/DL (ref 6–20)
CALCIUM SPEC-SCNC: 9.6 MG/DL (ref 8.6–10.5)
CHLORIDE SERPL-SCNC: 102 MMOL/L (ref 98–107)
CHOLEST SERPL-MCNC: 184 MG/DL (ref 0–200)
CLARITY UR: CLEAR
CO2 SERPL-SCNC: 25.4 MMOL/L (ref 22–29)
COLOR UR: YELLOW
CREAT BLD-MCNC: 1.1 MG/DL (ref 0.76–1.27)
DEPRECATED RDW RBC AUTO: 41.3 FL (ref 37–54)
EOSINOPHIL # BLD AUTO: 0.06 10*3/MM3 (ref 0–0.4)
EOSINOPHIL NFR BLD AUTO: 0.5 % (ref 0.3–6.2)
ERYTHROCYTE [DISTWIDTH] IN BLOOD BY AUTOMATED COUNT: 13.6 % (ref 12.3–15.4)
GFR SERPL CREATININE-BSD FRML MDRD: 71 ML/MIN/1.73
GGT SERPL-CCNC: 19 U/L (ref 8–61)
GLOBULIN UR ELPH-MCNC: 2.6 GM/DL
GLUCOSE BLD-MCNC: 84 MG/DL (ref 65–99)
GLUCOSE UR STRIP-MCNC: NEGATIVE MG/DL
HCT VFR BLD AUTO: 49.4 % (ref 37.5–51)
HDLC SERPL-MCNC: 37 MG/DL (ref 40–60)
HGB BLD-MCNC: 15.9 G/DL (ref 13–17.7)
HGB UR QL STRIP.AUTO: NEGATIVE
IMM GRANULOCYTES # BLD AUTO: 0.08 10*3/MM3 (ref 0–0.05)
IMM GRANULOCYTES NFR BLD AUTO: 0.7 % (ref 0–0.5)
IRON 24H UR-MRATE: 94 MCG/DL (ref 59–158)
KETONES UR QL STRIP: NEGATIVE
LDH SERPL-CCNC: 260 U/L (ref 135–225)
LDLC SERPL CALC-MCNC: 107 MG/DL (ref 0–100)
LDLC/HDLC SERPL: 2.9 {RATIO}
LEUKOCYTE ESTERASE UR QL STRIP.AUTO: NEGATIVE
LYMPHOCYTES # BLD AUTO: 4.44 10*3/MM3 (ref 0.7–3.1)
LYMPHOCYTES NFR BLD AUTO: 38.4 % (ref 19.6–45.3)
MCH RBC QN AUTO: 27.3 PG (ref 26.6–33)
MCHC RBC AUTO-ENTMCNC: 32.2 G/DL (ref 31.5–35.7)
MCV RBC AUTO: 84.9 FL (ref 79–97)
MONOCYTES # BLD AUTO: 0.8 10*3/MM3 (ref 0.1–0.9)
MONOCYTES NFR BLD AUTO: 6.9 % (ref 5–12)
NEUTROPHILS # BLD AUTO: 6.13 10*3/MM3 (ref 1.7–7)
NEUTROPHILS NFR BLD AUTO: 53.2 % (ref 42.7–76)
NITRITE UR QL STRIP: NEGATIVE
NRBC BLD AUTO-RTO: 0 /100 WBC (ref 0–0.2)
PH UR STRIP.AUTO: 6 [PH] (ref 5–8)
PHOSPHATE SERPL-MCNC: 3.9 MG/DL (ref 2.5–4.5)
PLATELET # BLD AUTO: 246 10*3/MM3 (ref 140–450)
PMV BLD AUTO: 10.4 FL (ref 6–12)
POTASSIUM BLD-SCNC: 4.8 MMOL/L (ref 3.5–5.2)
PROT SERPL-MCNC: 6.9 G/DL (ref 6–8.5)
PROT UR QL STRIP: NEGATIVE
PSA SERPL-MCNC: 0.47 NG/ML (ref 0–4)
RBC # BLD AUTO: 5.82 10*6/MM3 (ref 4.14–5.8)
SODIUM BLD-SCNC: 141 MMOL/L (ref 136–145)
SP GR UR STRIP: 1.02 (ref 1–1.03)
TRIGL SERPL-MCNC: 199 MG/DL (ref 0–150)
URATE SERPL-MCNC: 5.9 MG/DL (ref 3.4–7)
UROBILINOGEN UR QL STRIP: NORMAL
VLDLC SERPL-MCNC: 39.8 MG/DL (ref 5–40)
WBC NRBC COR # BLD: 11.55 10*3/MM3 (ref 3.4–10.8)

## 2020-01-24 PROCEDURE — 85025 COMPLETE CBC W/AUTO DIFF WBC: CPT | Performed by: PHYSICAL MEDICINE & REHABILITATION

## 2020-01-24 PROCEDURE — 86481 TB AG RESPONSE T-CELL SUSP: CPT | Performed by: PHYSICAL MEDICINE & REHABILITATION

## 2020-01-24 PROCEDURE — 81003 URINALYSIS AUTO W/O SCOPE: CPT | Performed by: PHYSICAL MEDICINE & REHABILITATION

## 2020-01-24 PROCEDURE — 80061 LIPID PANEL: CPT | Performed by: PHYSICAL MEDICINE & REHABILITATION

## 2020-01-24 PROCEDURE — 80053 COMPREHEN METABOLIC PANEL: CPT | Performed by: PHYSICAL MEDICINE & REHABILITATION

## 2020-01-24 PROCEDURE — 84153 ASSAY OF PSA TOTAL: CPT | Performed by: PHYSICAL MEDICINE & REHABILITATION

## 2020-01-26 LAB
TSPOT INTERPRETATION: NEGATIVE
TSPOT NIL CONTROL INTERPRETATION: NORMAL
TSPOT PANEL A: 0
TSPOT PANEL B: 2
TSPOT POS CONTROL INTERPRETATION: NORMAL

## 2020-01-30 ENCOUNTER — HOSPITAL ENCOUNTER (OUTPATIENT)
Dept: CARDIOLOGY | Facility: HOSPITAL | Age: 50
Discharge: HOME | End: 2020-01-30

## 2020-01-30 DIAGNOSIS — Z00.00 PHYSICAL EXAM: ICD-10-CM

## 2020-01-30 LAB
BH CV STRESS BP STAGE 1: NORMAL
BH CV STRESS BP STAGE 2: NORMAL
BH CV STRESS BP STAGE 3: NORMAL
BH CV STRESS DURATION MIN STAGE 1: 3
BH CV STRESS DURATION MIN STAGE 2: 3
BH CV STRESS DURATION MIN STAGE 3: 3
BH CV STRESS DURATION SEC STAGE 1: 0
BH CV STRESS DURATION SEC STAGE 2: 0
BH CV STRESS DURATION SEC STAGE 3: 0
BH CV STRESS GRADE STAGE 1: 10
BH CV STRESS GRADE STAGE 2: 12
BH CV STRESS GRADE STAGE 3: 14
BH CV STRESS HR STAGE 1: 99
BH CV STRESS HR STAGE 2: 129
BH CV STRESS HR STAGE 3: 152
BH CV STRESS METS STAGE 1: 5
BH CV STRESS METS STAGE 2: 7.5
BH CV STRESS METS STAGE 3: 10
BH CV STRESS PROTOCOL 1: NORMAL
BH CV STRESS RECOVERY BP: NORMAL MMHG
BH CV STRESS RECOVERY HR: 94 BPM
BH CV STRESS SPEED STAGE 1: 1.7
BH CV STRESS SPEED STAGE 2: 2.5
BH CV STRESS SPEED STAGE 3: 3.4
BH CV STRESS STAGE 1: 1
BH CV STRESS STAGE 2: 2
BH CV STRESS STAGE 3: 3
MAXIMAL PREDICTED HEART RATE: 169 BPM
PERCENT MAX PREDICTED HR: 89.94 %
STRESS BASELINE BP: NORMAL MMHG
STRESS BASELINE HR: 77 BPM
STRESS PERCENT HR: 106 %
STRESS POST ESTIMATED WORKLOAD: 10 METS
STRESS POST EXERCISE DUR MIN: 9 MIN
STRESS POST EXERCISE DUR SEC: 0 SEC
STRESS POST PEAK BP: NORMAL MMHG
STRESS POST PEAK HR: 152 BPM
STRESS TARGET HR: 144 BPM

## 2020-01-30 PROCEDURE — 93016 CV STRESS TEST SUPVJ ONLY: CPT | Performed by: INTERNAL MEDICINE

## 2020-01-30 PROCEDURE — 93018 CV STRESS TEST I&R ONLY: CPT | Performed by: INTERNAL MEDICINE

## 2020-01-30 PROCEDURE — 93017 CV STRESS TEST TRACING ONLY: CPT

## 2020-02-14 ENCOUNTER — TELEPHONE (OUTPATIENT)
Dept: CARDIOLOGY | Facility: CLINIC | Age: 50
End: 2020-02-14

## 2020-02-14 NOTE — TELEPHONE ENCOUNTER
Lana called re pt stress test done last 1/30/2020. They needing a clarification re arrhythmias.                Thanks  Meche HEDRICK

## 2020-02-19 NOTE — TELEPHONE ENCOUNTER
Per Dr Greenberg, Arrhythmias were not significant. Called and informed Lana.      Thanks  Meche HEDRICK

## 2021-06-08 ENCOUNTER — OFFICE VISIT (OUTPATIENT)
Dept: FAMILY MEDICINE CLINIC | Facility: CLINIC | Age: 51
End: 2021-06-08

## 2021-06-08 VITALS
BODY MASS INDEX: 39.2 KG/M2 | HEIGHT: 71 IN | DIASTOLIC BLOOD PRESSURE: 80 MMHG | HEART RATE: 67 BPM | OXYGEN SATURATION: 98 % | WEIGHT: 280 LBS | SYSTOLIC BLOOD PRESSURE: 142 MMHG

## 2021-06-08 DIAGNOSIS — I49.3 FREQUENT PVCS: ICD-10-CM

## 2021-06-08 DIAGNOSIS — I10 ESSENTIAL HYPERTENSION: ICD-10-CM

## 2021-06-08 DIAGNOSIS — R60.0 LOCALIZED EDEMA: ICD-10-CM

## 2021-06-08 DIAGNOSIS — Z12.11 SCREENING FOR COLON CANCER: ICD-10-CM

## 2021-06-08 DIAGNOSIS — Z12.5 SCREENING FOR MALIGNANT NEOPLASM OF PROSTATE: ICD-10-CM

## 2021-06-08 DIAGNOSIS — Z00.00 PREVENTATIVE HEALTH CARE: ICD-10-CM

## 2021-06-08 DIAGNOSIS — R00.2 PALPITATIONS: ICD-10-CM

## 2021-06-08 DIAGNOSIS — R73.9 HYPERGLYCEMIA: ICD-10-CM

## 2021-06-08 DIAGNOSIS — Z72.0 TOBACCO USE: Primary | ICD-10-CM

## 2021-06-08 PROBLEM — R00.1 BRADYCARDIA: Status: ACTIVE | Noted: 2021-06-08

## 2021-06-08 LAB — HBA1C MFR BLD: 5.1 % (ref 3.5–5.6)

## 2021-06-08 PROCEDURE — 84443 ASSAY THYROID STIM HORMONE: CPT | Performed by: NURSE PRACTITIONER

## 2021-06-08 PROCEDURE — 80061 LIPID PANEL: CPT | Performed by: NURSE PRACTITIONER

## 2021-06-08 PROCEDURE — G0103 PSA SCREENING: HCPCS | Performed by: NURSE PRACTITIONER

## 2021-06-08 PROCEDURE — 83036 HEMOGLOBIN GLYCOSYLATED A1C: CPT | Performed by: NURSE PRACTITIONER

## 2021-06-08 PROCEDURE — 93000 ELECTROCARDIOGRAM COMPLETE: CPT | Performed by: NURSE PRACTITIONER

## 2021-06-08 PROCEDURE — 83880 ASSAY OF NATRIURETIC PEPTIDE: CPT | Performed by: NURSE PRACTITIONER

## 2021-06-08 PROCEDURE — 85025 COMPLETE CBC W/AUTO DIFF WBC: CPT | Performed by: NURSE PRACTITIONER

## 2021-06-08 PROCEDURE — 99396 PREV VISIT EST AGE 40-64: CPT | Performed by: NURSE PRACTITIONER

## 2021-06-08 PROCEDURE — 80053 COMPREHEN METABOLIC PANEL: CPT | Performed by: NURSE PRACTITIONER

## 2021-06-08 PROCEDURE — 86803 HEPATITIS C AB TEST: CPT | Performed by: NURSE PRACTITIONER

## 2021-06-08 PROCEDURE — 36415 COLL VENOUS BLD VENIPUNCTURE: CPT | Performed by: NURSE PRACTITIONER

## 2021-06-08 RX ORDER — PANTOPRAZOLE SODIUM 40 MG/1
40 TABLET, DELAYED RELEASE ORAL DAILY
Qty: 90 TABLET | Refills: 0 | Status: SHIPPED | OUTPATIENT
Start: 2021-06-08 | End: 2021-09-07

## 2021-06-08 NOTE — PATIENT INSTRUCTIONS
Steps to Quit Smoking  Smoking tobacco is the leading cause of preventable death. It can affect almost every organ in the body. Smoking puts you and those around you at risk for developing many serious chronic diseases. Quitting smoking can be difficult, but it is one of the best things that you can do for your health. It is never too late to quit.  How do I get ready to quit?  When you decide to quit smoking, create a plan to help you succeed. Before you quit:  · Pick a date to quit. Set a date within the next 2 weeks to give you time to prepare.  · Write down the reasons why you are quitting. Keep this list in places where you will see it often.  · Tell your family, friends, and co-workers that you are quitting. Support from your loved ones can make quitting easier.  · Talk with your health care provider about your options for quitting smoking.  · Find out what treatment options are covered by your health insurance.  · Identify people, places, things, and activities that make you want to smoke (triggers). Avoid them.  What first steps can I take to quit smoking?  · Throw away all cigarettes at home, at work, and in your car.  · Throw away smoking accessories, such as ashtrays and lighters.  · Clean your car. Make sure to empty the ashtray.  · Clean your home, including curtains and carpets.  What strategies can I use to quit smoking?  Talk with your health care provider about combining strategies, such as taking medicines while you are also receiving in-person counseling. Using these two strategies together makes you more likely to succeed in quitting than if you used either strategy on its own.  · If you are pregnant or breastfeeding, talk with your health care provider about finding counseling or other support strategies to quit smoking. Do not take medicine to help you quit smoking unless your health care provider tells you to do so.  To quit smoking:  Quit right away  · Quit smoking completely, instead of  gradually reducing how much you smoke over a period of time. Research shows that stopping smoking right away is more successful than gradually quitting.  · Attend in-person counseling to help you build problem-solving skills. You are more likely to succeed in quitting if you attend counseling sessions regularly. Even short sessions of 10 minutes can be effective.  Take medicine  You may take medicines to help you quit smoking. Some medicines require a prescription and some you can purchase over-the-counter. Medicines may have nicotine in them to replace the nicotine in cigarettes. Medicines may:  · Help to stop cravings.  · Help to relieve withdrawal symptoms.  Your health care provider may recommend:  · Nicotine patches, gum, or lozenges.  · Nicotine inhalers or sprays.  · Non-nicotine medicine that is taken by mouth.  Find resources  Find resources and support systems that can help you to quit smoking and remain smoke-free after you quit. These resources are most helpful when you use them often. They include:  · Online chats with a counselor.  · Telephone quitlines.  · Printed self-help materials.  · Support groups or group counseling.  · Text messaging programs.  · Mobile phone apps or applications. Use apps that can help you stick to your quit plan by providing reminders, tips, and encouragement. There are many free apps for mobile devices as well as websites. Examples include Quit Guide from the CDC and smokefree.gov  What things can I do to make it easier to quit?    · Reach out to your family and friends for support and encouragement. Call telephone quitlines (2-728-QUIT-NOW), reach out to support groups, or work with a counselor for support.  · Ask people who smoke to avoid smoking around you.  · Avoid places that trigger you to smoke, such as bars, parties, or smoke-break areas at work.  · Spend time with people who do not smoke.  · Lessen the stress in your life. Stress can be a smoking trigger for some  people. To lessen stress, try:  ? Exercising regularly.  ? Doing deep-breathing exercises.  ? Doing yoga.  ? Meditating.  ? Performing a body scan. This involves closing your eyes, scanning your body from head to toe, and noticing which parts of your body are particularly tense. Try to relax the muscles in those areas.  How will I feel when I quit smoking?  Day 1 to 3 weeks  Within the first 24 hours of quitting smoking, you may start to feel withdrawal symptoms. These symptoms are usually most noticeable 2-3 days after quitting, but they usually do not last for more than 2-3 weeks. You may experience these symptoms:  · Mood swings.  · Restlessness, anxiety, or irritability.  · Trouble concentrating.  · Dizziness.  · Strong cravings for sugary foods and nicotine.  · Mild weight gain.  · Constipation.  · Nausea.  · Coughing or a sore throat.  · Changes in how the medicines that you take for unrelated issues work in your body.  · Depression.  · Trouble sleeping (insomnia).  Week 3 and afterward  After the first 2-3 weeks of quitting, you may start to notice more positive results, such as:  · Improved sense of smell and taste.  · Decreased coughing and sore throat.  · Slower heart rate.  · Lower blood pressure.  · Clearer skin.  · The ability to breathe more easily.  · Fewer sick days.  Quitting smoking can be very challenging. Do not get discouraged if you are not successful the first time. Some people need to make many attempts to quit before they achieve long-term success. Do your best to stick to your quit plan, and talk with your health care provider if you have any questions or concerns.  Summary  · Smoking tobacco is the leading cause of preventable death. Quitting smoking is one of the best things that you can do for your health.  · When you decide to quit smoking, create a plan to help you succeed.  · Quit smoking right away, not slowly over a period of time.  · When you start quitting, seek help from your  health care provider, family, or friends.  This information is not intended to replace advice given to you by your health care provider. Make sure you discuss any questions you have with your health care provider.  Document Revised: 09/11/2020 Document Reviewed: 03/07/2020  Elsevier Patient Education © 2021 [employer]

## 2021-06-08 NOTE — ASSESSMENT & PLAN NOTE
1.  Decrease sodium intake  2.  Advised patient to start wearing compression hose  3.  Check labs  4.  Consider HCTZ, will call patient with results and plan of care once available

## 2021-06-08 NOTE — ASSESSMENT & PLAN NOTE
1.  No known history of hypertension  2.  Repeat manual blood pressure was 142/80  3.  Encouraged patient to decrease caffeine intake and sodium intake

## 2021-06-08 NOTE — ASSESSMENT & PLAN NOTE
1.  Decrease caffeine intake, as advised previously  2.  Refer to cardiology for further consultation because patient is symptomatic  3.  He has a history of bradycardia, reportedly frequently as low as 47 bpm

## 2021-06-09 LAB
ALBUMIN SERPL-MCNC: 4 G/DL (ref 3.5–5.2)
ALBUMIN/GLOB SERPL: 1.1 G/DL
ALP SERPL-CCNC: 70 U/L (ref 39–117)
ALT SERPL W P-5'-P-CCNC: 28 U/L (ref 1–41)
ANION GAP SERPL CALCULATED.3IONS-SCNC: 13.3 MMOL/L (ref 5–15)
AST SERPL-CCNC: 17 U/L (ref 1–40)
BASOPHILS # BLD AUTO: 0.03 10*3/MM3 (ref 0–0.2)
BASOPHILS NFR BLD AUTO: 0.4 % (ref 0–1.5)
BILIRUB SERPL-MCNC: 0.6 MG/DL (ref 0–1.2)
BUN SERPL-MCNC: 12 MG/DL (ref 6–20)
BUN/CREAT SERPL: 11 (ref 7–25)
CALCIUM SPEC-SCNC: 10.2 MG/DL (ref 8.6–10.5)
CHLORIDE SERPL-SCNC: 103 MMOL/L (ref 98–107)
CHOLEST SERPL-MCNC: 188 MG/DL (ref 0–200)
CO2 SERPL-SCNC: 23.7 MMOL/L (ref 22–29)
CREAT SERPL-MCNC: 1.09 MG/DL (ref 0.76–1.27)
DEPRECATED RDW RBC AUTO: 41.4 FL (ref 37–54)
EOSINOPHIL # BLD AUTO: 0.17 10*3/MM3 (ref 0–0.4)
EOSINOPHIL NFR BLD AUTO: 2 % (ref 0.3–6.2)
ERYTHROCYTE [DISTWIDTH] IN BLOOD BY AUTOMATED COUNT: 13.4 % (ref 12.3–15.4)
GFR SERPL CREATININE-BSD FRML MDRD: 71 ML/MIN/1.73
GLOBULIN UR ELPH-MCNC: 3.6 GM/DL
GLUCOSE SERPL-MCNC: 88 MG/DL (ref 65–99)
HCT VFR BLD AUTO: 53.2 % (ref 37.5–51)
HCV AB SER DONR QL: NORMAL
HDLC SERPL-MCNC: 35 MG/DL (ref 40–60)
HGB BLD-MCNC: 17.7 G/DL (ref 13–17.7)
IMM GRANULOCYTES # BLD AUTO: 0.08 10*3/MM3 (ref 0–0.05)
IMM GRANULOCYTES NFR BLD AUTO: 1 % (ref 0–0.5)
LDLC SERPL CALC-MCNC: 109 MG/DL (ref 0–100)
LDLC/HDLC SERPL: 2.93 {RATIO}
LYMPHOCYTES # BLD AUTO: 2.67 10*3/MM3 (ref 0.7–3.1)
LYMPHOCYTES NFR BLD AUTO: 32 % (ref 19.6–45.3)
MCH RBC QN AUTO: 28.6 PG (ref 26.6–33)
MCHC RBC AUTO-ENTMCNC: 33.3 G/DL (ref 31.5–35.7)
MCV RBC AUTO: 86.1 FL (ref 79–97)
MONOCYTES # BLD AUTO: 0.45 10*3/MM3 (ref 0.1–0.9)
MONOCYTES NFR BLD AUTO: 5.4 % (ref 5–12)
NEUTROPHILS NFR BLD AUTO: 4.95 10*3/MM3 (ref 1.7–7)
NEUTROPHILS NFR BLD AUTO: 59.2 % (ref 42.7–76)
NRBC BLD AUTO-RTO: 0 /100 WBC (ref 0–0.2)
NT-PROBNP SERPL-MCNC: 328 PG/ML (ref 0–900)
PLATELET # BLD AUTO: 254 10*3/MM3 (ref 140–450)
PMV BLD AUTO: 10.5 FL (ref 6–12)
POTASSIUM SERPL-SCNC: 4.5 MMOL/L (ref 3.5–5.2)
PROT SERPL-MCNC: 7.6 G/DL (ref 6–8.5)
PSA SERPL-MCNC: 0.69 NG/ML (ref 0–4)
RBC # BLD AUTO: 6.18 10*6/MM3 (ref 4.14–5.8)
SODIUM SERPL-SCNC: 140 MMOL/L (ref 136–145)
TRIGL SERPL-MCNC: 252 MG/DL (ref 0–150)
TSH SERPL DL<=0.05 MIU/L-ACNC: 2.18 UIU/ML (ref 0.27–4.2)
VLDLC SERPL-MCNC: 44 MG/DL (ref 5–40)
WBC # BLD AUTO: 8.35 10*3/MM3 (ref 3.4–10.8)

## 2021-06-09 RX ORDER — ATORVASTATIN CALCIUM 20 MG/1
20 TABLET, FILM COATED ORAL DAILY
Qty: 90 TABLET | Refills: 0 | Status: SHIPPED | OUTPATIENT
Start: 2021-06-09 | End: 2021-09-07

## 2021-06-09 NOTE — PROGRESS NOTES
Patient needs to decrease NA intake and increase water intake. Wear compression hose while working. If swelling persists, we will treat accordingly. I also want him to monitor his BP at home and log. Call if consistently > 140/90. Referral to cardiology sent to discuss symptomatic PVCs.

## 2021-06-09 NOTE — PROGRESS NOTES
TSH normal. BNP is normal at 328, no indication of CHF. PSA normal. Kidneys, liver, glucose normal. A1C is 5.1 which is normal. Triglycerides are 252,  and VLDL 44, all higher than last year. My recommendation would be to start Lipitor nightly to treat.    The 10-year ASCVD risk score (Sara DIOP Jr., et al., 2013) is: 13.4%    Values used to calculate the score:      Age: 50 years      Sex: Male      Is Non- : No      Diabetic: No      Tobacco smoker: Yes      Systolic Blood Pressure: 142 mmHg      Is BP treated: No      HDL Cholesterol: 35 mg/dL      Total Cholesterol: 188 mg/dL

## 2021-06-22 ENCOUNTER — TELEPHONE (OUTPATIENT)
Dept: FAMILY MEDICINE CLINIC | Facility: CLINIC | Age: 51
End: 2021-06-22

## 2021-06-22 NOTE — TELEPHONE ENCOUNTER
"Please read message from cardiology referral:    \"PATIENT UPSET THERE WOULD BE WAIT FOR 2:00 PM APT, HE SHOWED UP AT 12:58. WHEN I TOLD HIM HE WAS A LITTLE EARLY HE SAID HE CHIQUITA JUST GO HOME AND CANCEL APT. DID NOT WANT TO SEE US ANYWAYS, BUT HIS PCP MADE HIM. \"  "

## 2021-09-07 RX ORDER — ATORVASTATIN CALCIUM 20 MG/1
20 TABLET, FILM COATED ORAL DAILY
Qty: 90 TABLET | Refills: 0 | Status: SHIPPED | OUTPATIENT
Start: 2021-09-07 | End: 2022-09-06

## 2021-09-07 RX ORDER — PANTOPRAZOLE SODIUM 40 MG/1
40 TABLET, DELAYED RELEASE ORAL DAILY
Qty: 90 TABLET | Refills: 0 | Status: SHIPPED | OUTPATIENT
Start: 2021-09-07 | End: 2021-11-01 | Stop reason: SDUPTHER

## 2021-11-01 RX ORDER — PANTOPRAZOLE SODIUM 40 MG/1
40 TABLET, DELAYED RELEASE ORAL DAILY
Qty: 90 TABLET | Refills: 0 | Status: SHIPPED | OUTPATIENT
Start: 2021-11-01 | End: 2022-02-14

## 2021-11-01 NOTE — TELEPHONE ENCOUNTER
Rx Refill Note  Requested Prescriptions     Pending Prescriptions Disp Refills   • pantoprazole (PROTONIX) 40 MG EC tablet 90 tablet 0     Sig: Take 1 tablet by mouth Daily.      Last office visit with prescribing clinician: 6/8/2021      Next office visit with prescribing clinician: Visit date not found            Chinyere Paige MA  11/01/21, 15:56 EDT

## 2022-02-14 RX ORDER — PANTOPRAZOLE SODIUM 40 MG/1
40 TABLET, DELAYED RELEASE ORAL DAILY
Qty: 90 TABLET | Refills: 0 | Status: SHIPPED | OUTPATIENT
Start: 2022-02-14 | End: 2022-05-19 | Stop reason: SDUPTHER

## 2022-05-19 RX ORDER — PANTOPRAZOLE SODIUM 40 MG/1
40 TABLET, DELAYED RELEASE ORAL DAILY
Qty: 90 TABLET | Refills: 0 | Status: SHIPPED | OUTPATIENT
Start: 2022-05-19 | End: 2022-08-22

## 2022-08-22 RX ORDER — PANTOPRAZOLE SODIUM 40 MG/1
40 TABLET, DELAYED RELEASE ORAL DAILY
Qty: 90 TABLET | Refills: 0 | Status: SHIPPED | OUTPATIENT
Start: 2022-08-22 | End: 2022-11-28

## 2022-09-06 ENCOUNTER — OFFICE VISIT (OUTPATIENT)
Dept: FAMILY MEDICINE CLINIC | Facility: CLINIC | Age: 52
End: 2022-09-06

## 2022-09-06 VITALS
HEIGHT: 71 IN | SYSTOLIC BLOOD PRESSURE: 134 MMHG | HEART RATE: 59 BPM | WEIGHT: 276 LBS | BODY MASS INDEX: 38.64 KG/M2 | OXYGEN SATURATION: 99 % | DIASTOLIC BLOOD PRESSURE: 88 MMHG

## 2022-09-06 DIAGNOSIS — I49.3 PVC'S (PREMATURE VENTRICULAR CONTRACTIONS): ICD-10-CM

## 2022-09-06 DIAGNOSIS — Z12.5 SCREENING FOR MALIGNANT NEOPLASM OF PROSTATE: ICD-10-CM

## 2022-09-06 DIAGNOSIS — Z12.11 SCREENING FOR COLON CANCER: ICD-10-CM

## 2022-09-06 DIAGNOSIS — Z00.00 PREVENTATIVE HEALTH CARE: Primary | ICD-10-CM

## 2022-09-06 DIAGNOSIS — K21.9 GASTROESOPHAGEAL REFLUX DISEASE, UNSPECIFIED WHETHER ESOPHAGITIS PRESENT: ICD-10-CM

## 2022-09-06 PROCEDURE — 36415 COLL VENOUS BLD VENIPUNCTURE: CPT | Performed by: NURSE PRACTITIONER

## 2022-09-06 PROCEDURE — G0103 PSA SCREENING: HCPCS | Performed by: NURSE PRACTITIONER

## 2022-09-06 PROCEDURE — 80050 GENERAL HEALTH PANEL: CPT | Performed by: NURSE PRACTITIONER

## 2022-09-06 PROCEDURE — 83036 HEMOGLOBIN GLYCOSYLATED A1C: CPT | Performed by: NURSE PRACTITIONER

## 2022-09-06 PROCEDURE — 99396 PREV VISIT EST AGE 40-64: CPT | Performed by: NURSE PRACTITIONER

## 2022-09-06 PROCEDURE — 80061 LIPID PANEL: CPT | Performed by: NURSE PRACTITIONER

## 2022-09-06 NOTE — ASSESSMENT & PLAN NOTE
1.  Palpitations have resolved  2.  Assessment today is within normal limits  3.  Patient will have follow-up EKG in [place] for employer  4.  Recommended patient reschedule with cardiology if palpitations return  5.  Continue limiting caffeine consumption

## 2022-09-06 NOTE — PROGRESS NOTES
"Chief Complaint  Follow-up (Yearly follow up GERD)    Subjective        Jaime Lyle presents to [facility]  History of Present Illness    Patient presents for annual exam. Patient has GERD, taking Protonix 40mg daily. He is having heartburn, regurgitation when lying flat. Patient reports recent dietary changes. He denies abdominal pain or vomiting. Patient denies chest pain, edema, dizziness, headache or dyspnea. He does have intermittent cough since having COVID-19 in April. Patient referred to Cardiology for frequent PVCs, canceled due to COVID-19 and has not rescheduled.  He denies palpitations, reports he has eliminated caffeine from his diet.  Patient referred for Colonoscopy, has not scheduled.     PHQ-9 Depression Screening  Little interest or pleasure in doing things? 0-->not at all   Feeling down, depressed, or hopeless? 0-->not at all   Trouble falling or staying asleep, or sleeping too much?     Feeling tired or having little energy?     Poor appetite or overeating?     Feeling bad about yourself - or that you are a failure or have let yourself or your family down?     Trouble concentrating on things, such as reading the newspaper or watching television?     Moving or speaking so slowly that other people could have noticed? Or the opposite - being so fidgety or restless that you have been moving around a lot more than usual?     Thoughts that you would be better off dead, or of hurting yourself in some way?     PHQ-9 Total Score 0   If you checked off any problems, how difficult have these problems made it for you to do your work, take care of things at home, or get along with other people?         Objective   Vital Signs:  /88 (BP Location: Left arm, Patient Position: Sitting, Cuff Size: Large Adult)   Pulse 59   Ht 180.3 cm (71\")   Wt 125 kg (276 lb)   SpO2 99%   BMI 38.49 kg/m²   Estimated body mass index is 38.49 kg/m² as calculated from the following:    " "Height as of this encounter: 180.3 cm (71\").    Weight as of this encounter: 125 kg (276 lb).          Physical Exam  Constitutional:       Appearance: Normal appearance.   HENT:      Head: Normocephalic.   Cardiovascular:      Rate and Rhythm: Normal rate and regular rhythm.   Pulmonary:      Effort: Pulmonary effort is normal.      Breath sounds: Normal breath sounds.   Abdominal:      General: Abdomen is flat. Bowel sounds are normal.      Palpations: Abdomen is soft.   Musculoskeletal:         General: Normal range of motion.      Cervical back: Neck supple.      Right lower leg: No edema.      Left lower leg: No edema.   Skin:     General: Skin is warm and dry.   Neurological:      Mental Status: He is alert and oriented to person, place, and time.      Gait: Gait is intact.   Psychiatric:         Attention and Perception: Attention normal.         Mood and Affect: Mood normal.         Speech: Speech normal.        Result Review :                Assessment and Plan   Diagnoses and all orders for this visit:    1. Preventative health care (Primary)  -     CBC (No Diff)  -     Comprehensive Metabolic Panel  -     TSH  -     Lipid Panel  -     Hemoglobin A1c    2. Screening for malignant neoplasm of prostate  -     PSA Screen    3. PVC's (premature ventricular contractions)  Assessment & Plan:  1.  Palpitations have resolved  2.  Assessment today is within normal limits  3.  Patient will have follow-up EKG in [place] for employer  4.  Recommended patient reschedule with cardiology if palpitations return  5.  Continue limiting caffeine consumption      4. Screening for colon cancer  Assessment & Plan:  1.  Patient provided new colonoscopy packet, recommended he schedule      5. Gastroesophageal reflux disease, unspecified whether esophagitis present  Assessment & Plan:  1.  Continue Protonix as prescribed  2.  Avoid irritating foods  3.  Call if symptoms persist           I spent 30 minutes caring for Jaime on " this date of service. This time includes time spent by me in the following activities:preparing for the visit, reviewing tests, obtaining and/or reviewing a separately obtained history, performing a medically appropriate examination and/or evaluation , counseling and educating the patient/family/caregiver, ordering medications, tests, or procedures, documenting information in the medical record, independently interpreting results and communicating that information with the patient/family/caregiver and care coordination  Follow Up   Return in about 1 year (around 9/6/2023) for Annual physical.  Patient was given instructions and counseling regarding his condition or for health maintenance advice. Please see specific information pulled into the AVS if appropriate.       Answers for HPI/ROS submitted by the patient on 9/4/2022  What is the primary reason for your visit?: Other  Please describe your symptoms.: Annual checkup and GERD  Have you had these symptoms before?: Yes  How long have you been having these symptoms?: Greater than 2 weeks  Please list any medications you are currently taking for this condition.: Pantaprozole 40mg    Counseling was given to patient for the following topics: instructions for management, risk factor reductions, prognosis, patient and family education, impressions, risks and benefits of treatment options and importance of treatment compliance . Total time of the encounter was 25 minutes and 5 minutes was spent counseling.

## 2022-09-07 LAB
ALBUMIN SERPL-MCNC: 4.1 G/DL (ref 3.5–5.2)
ALBUMIN/GLOB SERPL: 1.5 G/DL
ALP SERPL-CCNC: 67 U/L (ref 39–117)
ALT SERPL W P-5'-P-CCNC: 18 U/L (ref 1–41)
ANION GAP SERPL CALCULATED.3IONS-SCNC: 9.1 MMOL/L (ref 5–15)
AST SERPL-CCNC: 14 U/L (ref 1–40)
BILIRUB SERPL-MCNC: 0.8 MG/DL (ref 0–1.2)
BUN SERPL-MCNC: 15 MG/DL (ref 6–20)
BUN/CREAT SERPL: 16.9 (ref 7–25)
CALCIUM SPEC-SCNC: 9.1 MG/DL (ref 8.6–10.5)
CHLORIDE SERPL-SCNC: 107 MMOL/L (ref 98–107)
CHOLEST SERPL-MCNC: 179 MG/DL (ref 0–200)
CO2 SERPL-SCNC: 23.9 MMOL/L (ref 22–29)
CREAT SERPL-MCNC: 0.89 MG/DL (ref 0.76–1.27)
DEPRECATED RDW RBC AUTO: 39 FL (ref 37–54)
EGFRCR SERPLBLD CKD-EPI 2021: 102.5 ML/MIN/1.73
ERYTHROCYTE [DISTWIDTH] IN BLOOD BY AUTOMATED COUNT: 13 % (ref 12.3–15.4)
GLOBULIN UR ELPH-MCNC: 2.8 GM/DL
GLUCOSE SERPL-MCNC: 89 MG/DL (ref 65–99)
HBA1C MFR BLD: 5 % (ref 3.5–5.6)
HCT VFR BLD AUTO: 48.7 % (ref 37.5–51)
HDLC SERPL-MCNC: 38 MG/DL (ref 40–60)
HGB BLD-MCNC: 16.3 G/DL (ref 13–17.7)
LDLC SERPL CALC-MCNC: 117 MG/DL (ref 0–100)
LDLC/HDLC SERPL: 3.02 {RATIO}
MCH RBC QN AUTO: 27.8 PG (ref 26.6–33)
MCHC RBC AUTO-ENTMCNC: 33.5 G/DL (ref 31.5–35.7)
MCV RBC AUTO: 83 FL (ref 79–97)
PLATELET # BLD AUTO: 240 10*3/MM3 (ref 140–450)
PMV BLD AUTO: 10.8 FL (ref 6–12)
POTASSIUM SERPL-SCNC: 4.5 MMOL/L (ref 3.5–5.2)
PROT SERPL-MCNC: 6.9 G/DL (ref 6–8.5)
PSA SERPL-MCNC: 0.49 NG/ML (ref 0–4)
RBC # BLD AUTO: 5.87 10*6/MM3 (ref 4.14–5.8)
SODIUM SERPL-SCNC: 140 MMOL/L (ref 136–145)
TRIGL SERPL-MCNC: 131 MG/DL (ref 0–150)
TSH SERPL DL<=0.05 MIU/L-ACNC: 2 UIU/ML (ref 0.27–4.2)
VLDLC SERPL-MCNC: 24 MG/DL (ref 5–40)
WBC NRBC COR # BLD: 6.89 10*3/MM3 (ref 3.4–10.8)

## 2022-11-28 RX ORDER — PANTOPRAZOLE SODIUM 40 MG/1
40 TABLET, DELAYED RELEASE ORAL DAILY
Qty: 90 TABLET | Refills: 0 | Status: SHIPPED | OUTPATIENT
Start: 2022-11-28 | End: 2023-02-27

## 2022-12-28 ENCOUNTER — HOSPITAL ENCOUNTER (OUTPATIENT)
Dept: GENERAL RADIOLOGY | Facility: HOSPITAL | Age: 52
Discharge: HOME | End: 2022-12-28
Admitting: NURSE PRACTITIONER

## 2022-12-28 ENCOUNTER — TELEPHONE (OUTPATIENT)
Dept: FAMILY MEDICINE CLINIC | Facility: CLINIC | Age: 52
End: 2022-12-28

## 2022-12-28 DIAGNOSIS — R07.9 CHEST PAIN, UNSPECIFIED TYPE: Primary | ICD-10-CM

## 2022-12-28 DIAGNOSIS — R07.9 CHEST PAIN, UNSPECIFIED TYPE: ICD-10-CM

## 2022-12-28 PROCEDURE — 71046 X-RAY EXAM CHEST 2 VIEWS: CPT

## 2022-12-28 NOTE — TELEPHONE ENCOUNTER
Schedule is full today.  I can order chest x-ray to be completed and see him tomorrow at 1130.  Or he could go to the urgent care.  Let  me know how patient wants to proceed

## 2022-12-28 NOTE — TELEPHONE ENCOUNTER
Called pt read response from provider.  Pt is scheduled for tomorrow and states he will go to [facility] for chest xray.

## 2022-12-28 NOTE — PROGRESS NOTES
Patient's chest x-ray is normal, shows no active disease.  I will assess him at visit tomorrow and treat accordingly

## 2022-12-28 NOTE — TELEPHONE ENCOUNTER
Pt called in with concern of productive cough, pain in lungs, feels like pneumonia.  Pt states symptoms started a couple of days ago and the pain started in the middle of the night.  Informed pt that providers schedule is full.  Please advise

## 2022-12-29 ENCOUNTER — OFFICE VISIT (OUTPATIENT)
Dept: FAMILY MEDICINE CLINIC | Facility: CLINIC | Age: 52
End: 2022-12-29

## 2022-12-29 VITALS
SYSTOLIC BLOOD PRESSURE: 135 MMHG | HEIGHT: 71 IN | DIASTOLIC BLOOD PRESSURE: 70 MMHG | OXYGEN SATURATION: 97 % | WEIGHT: 278 LBS | HEART RATE: 73 BPM | BODY MASS INDEX: 38.92 KG/M2

## 2022-12-29 DIAGNOSIS — J40 BRONCHITIS: ICD-10-CM

## 2022-12-29 DIAGNOSIS — R05.9 COUGH, UNSPECIFIED TYPE: Primary | ICD-10-CM

## 2022-12-29 LAB
EXPIRATION DATE: NORMAL
FLUAV AG NPH QL: NEGATIVE
FLUBV AG NPH QL: NEGATIVE
INTERNAL CONTROL: NORMAL
Lab: NORMAL

## 2022-12-29 PROCEDURE — 87804 INFLUENZA ASSAY W/OPTIC: CPT | Performed by: NURSE PRACTITIONER

## 2022-12-29 PROCEDURE — 99213 OFFICE O/P EST LOW 20 MIN: CPT | Performed by: NURSE PRACTITIONER

## 2022-12-29 RX ORDER — FAMOTIDINE, CALCIUM CARBONATE, AND MAGNESIUM HYDROXIDE 10; 800; 165 MG/1; MG/1; MG/1
TABLET, CHEWABLE ORAL
COMMUNITY
Start: 2019-01-16

## 2022-12-29 RX ORDER — METHYLPREDNISOLONE 4 MG/1
TABLET ORAL
Qty: 21 TABLET | Refills: 0 | Status: SHIPPED | OUTPATIENT
Start: 2022-12-29

## 2022-12-29 RX ORDER — CEFDINIR 300 MG/1
300 CAPSULE ORAL 2 TIMES DAILY
Qty: 14 CAPSULE | Refills: 0 | Status: SHIPPED | OUTPATIENT
Start: 2022-12-29

## 2022-12-29 RX ORDER — ALBUTEROL SULFATE 90 UG/1
2 AEROSOL, METERED RESPIRATORY (INHALATION) EVERY 4 HOURS PRN
Qty: 8 G | Refills: 0 | Status: SHIPPED | OUTPATIENT
Start: 2022-12-29 | End: 2023-01-25

## 2022-12-29 NOTE — ASSESSMENT & PLAN NOTE
1.  Patient is negative for Flu A/B  2.  Reviewed chest x-ray with patient  3.  Start cefdinir 300 mg twice daily x7 days  4.  Medrol dose pack  5.  Albuterol as needed for dyspnea or wheezing  6.  Call if symptoms persist or worsen

## 2023-01-25 RX ORDER — ALBUTEROL SULFATE 90 UG/1
AEROSOL, METERED RESPIRATORY (INHALATION)
Qty: 8.5 G | Refills: 1 | Status: SHIPPED | OUTPATIENT
Start: 2023-01-25

## 2023-02-27 RX ORDER — PANTOPRAZOLE SODIUM 40 MG/1
40 TABLET, DELAYED RELEASE ORAL DAILY
Qty: 90 TABLET | Refills: 0 | Status: SHIPPED | OUTPATIENT
Start: 2023-02-27

## 2023-06-02 RX ORDER — PANTOPRAZOLE SODIUM 40 MG/1
40 TABLET, DELAYED RELEASE ORAL DAILY
Qty: 90 TABLET | Refills: 0 | Status: SHIPPED | OUTPATIENT
Start: 2023-06-02

## 2023-08-30 RX ORDER — PANTOPRAZOLE SODIUM 40 MG/1
40 TABLET, DELAYED RELEASE ORAL DAILY
Qty: 90 TABLET | Refills: 0 | Status: SHIPPED | OUTPATIENT
Start: 2023-08-30

## 2023-10-03 ENCOUNTER — OFFICE VISIT (OUTPATIENT)
Dept: FAMILY MEDICINE CLINIC | Facility: CLINIC | Age: 53
End: 2023-10-03
Payer: COMMERCIAL

## 2023-10-03 VITALS
HEIGHT: 71 IN | SYSTOLIC BLOOD PRESSURE: 130 MMHG | RESPIRATION RATE: 16 BRPM | BODY MASS INDEX: 40.04 KG/M2 | WEIGHT: 286 LBS | OXYGEN SATURATION: 98 % | TEMPERATURE: 98 F | HEART RATE: 57 BPM | DIASTOLIC BLOOD PRESSURE: 72 MMHG

## 2023-10-03 DIAGNOSIS — Z01.818 PRE-OP EXAMINATION: Primary | ICD-10-CM

## 2023-10-03 PROCEDURE — 81003 URINALYSIS AUTO W/O SCOPE: CPT | Performed by: NURSE PRACTITIONER

## 2023-10-03 RX ORDER — IBUPROFEN 200 MG
200 TABLET ORAL EVERY 6 HOURS PRN
COMMUNITY

## 2023-10-03 RX ORDER — CELECOXIB 200 MG/1
200 CAPSULE ORAL DAILY
COMMUNITY
Start: 2023-09-17

## 2023-10-03 NOTE — PROGRESS NOTES
"Chief Complaint  Pre-op Exam    Subjective        Jaime Lyle presents to [facility]  History of Present Illness    Patient presents today for preoperative examination.  Planning left meniscal repair on October 30, 2023 with Mustapha.  There is a history of premature atrial contractions and premature ventricular contractions in 2020.  Reported follow-up with [facility], last seen 1.5 years ago.  Reported eliminating all caffeinated beverages since that time; had previously consumed 12 Mountain Dew's a day.  Stress test completed in January 2020 with normal results.  There have been no rhythm problems since that time.  Labs reviewed today include complete blood count and basic metabolic profile.  EKG completed at [facility] on September 27, 2023 EKG results with sinus bradycardia, heart rate of 56, no ectopy, borderline left axis deviation, borderline R wave progression, borderline T wave abnormalities.  There was completion of chest x-ray on December 28, 2022 with normal results.             Objective   Vital Signs:  /72 (BP Location: Left arm, Patient Position: Sitting, Cuff Size: Adult)   Pulse 57   Temp 98 °F (36.7 °C) (Infrared)   Resp 16   Ht 180.3 cm (71\")   Wt 130 kg (286 lb)   SpO2 98%   BMI 39.89 kg/m²   Estimated body mass index is 39.89 kg/m² as calculated from the following:    Height as of this encounter: 180.3 cm (71\").    Weight as of this encounter: 130 kg (286 lb).       Class 2 Severe Obesity (BMI >=35 and <=39.9).     Physical Exam  Constitutional:       General: He is not in acute distress.     Appearance: He is obese.      Comments: Pleasant, converses appropriately    HENT:      Head: Normocephalic and atraumatic.      Right Ear: External ear normal.      Left Ear: External ear normal.      Nose: Nose normal. No rhinorrhea.   Eyes:      Conjunctiva/sclera: Conjunctivae normal.   Cardiovascular:      Rate and Rhythm: " Regular rhythm. Bradycardia present.      Chest Wall: PMI is not displaced.      Pulses: Normal pulses.      Heart sounds: Normal heart sounds.   Pulmonary:      Effort: Pulmonary effort is normal.      Breath sounds: Normal breath sounds.      Comments: Cough absent.   Abdominal:      General: Bowel sounds are normal.      Palpations: Abdomen is soft.      Tenderness: There is no abdominal tenderness.   Musculoskeletal:         General: Normal range of motion.      Cervical back: Neck supple.      Right lower leg: No edema.      Left lower leg: No edema.   Skin:     General: Skin is warm and dry.   Neurological:      Mental Status: He is alert and oriented to person, place, and time.      Gait: Gait is intact.   Psychiatric:         Mood and Affect: Mood and affect normal.         Thought Content: Thought content normal.         Cognition and Memory: Memory normal.         Judgment: Judgment normal.      Result Review :    CBC          9/27/2023    09:15   CBC   WBC 7.90       RBC 5.92       Hemoglobin 16.5       Hematocrit 50.5       MCV 85.3       MCH 27.9       MCHC 32.7       RDW 13.2       Platelets 216          Details          This result is from an external source.               Data reviewed : Cardiology studies stress test 1/24/2020.              Assessment and Plan   Diagnoses and all orders for this visit:    1. Pre-op examination (Primary)  -     Urinalysis With Culture If Indicated - Urine, Clean Catch             Follow Up   Return 6-8 weeks., for Annual physical.  Patient was given instructions and counseling regarding his condition or for health maintenance advice. Please see specific information pulled into the AVS if appropriate.

## 2023-10-04 LAB
BILIRUB UR QL STRIP: NEGATIVE
CLARITY UR: CLEAR
COLOR UR: YELLOW
GLUCOSE UR STRIP-MCNC: NEGATIVE MG/DL
HGB UR QL STRIP.AUTO: NEGATIVE
HOLD SPECIMEN: NORMAL
KETONES UR QL STRIP: NEGATIVE
LEUKOCYTE ESTERASE UR QL STRIP.AUTO: NEGATIVE
NITRITE UR QL STRIP: NEGATIVE
PH UR STRIP.AUTO: 6 [PH] (ref 5–8)
PROT UR QL STRIP: NEGATIVE
SP GR UR STRIP: 1.02 (ref 1–1.03)
UROBILINOGEN UR QL STRIP: NORMAL

## 2023-10-11 RX ORDER — PANTOPRAZOLE SODIUM 40 MG/1
40 TABLET, DELAYED RELEASE ORAL DAILY
Qty: 90 TABLET | Refills: 0 | Status: SHIPPED | OUTPATIENT
Start: 2023-10-11

## 2024-01-10 ENCOUNTER — OFFICE VISIT (OUTPATIENT)
Dept: FAMILY MEDICINE CLINIC | Facility: CLINIC | Age: 54
End: 2024-01-10
Payer: COMMERCIAL

## 2024-01-10 VITALS
OXYGEN SATURATION: 98 % | DIASTOLIC BLOOD PRESSURE: 80 MMHG | HEART RATE: 68 BPM | HEIGHT: 71 IN | SYSTOLIC BLOOD PRESSURE: 140 MMHG | WEIGHT: 287 LBS | BODY MASS INDEX: 40.18 KG/M2

## 2024-01-10 DIAGNOSIS — Z12.11 SCREENING FOR MALIGNANT NEOPLASM OF COLON: ICD-10-CM

## 2024-01-10 DIAGNOSIS — E66.01 CLASS 3 SEVERE OBESITY WITH SERIOUS COMORBIDITY AND BODY MASS INDEX (BMI) OF 40.0 TO 44.9 IN ADULT, UNSPECIFIED OBESITY TYPE: ICD-10-CM

## 2024-01-10 DIAGNOSIS — Z23 NEED FOR VACCINATION AGAINST STREPTOCOCCUS PNEUMONIAE: Primary | ICD-10-CM

## 2024-01-10 DIAGNOSIS — M25.562 CHRONIC PAIN OF LEFT KNEE: ICD-10-CM

## 2024-01-10 DIAGNOSIS — Z12.5 SCREENING FOR MALIGNANT NEOPLASM OF PROSTATE: ICD-10-CM

## 2024-01-10 DIAGNOSIS — Z00.00 ANNUAL PHYSICAL EXAM: ICD-10-CM

## 2024-01-10 DIAGNOSIS — G89.29 CHRONIC PAIN OF LEFT KNEE: ICD-10-CM

## 2024-01-10 DIAGNOSIS — Z98.84 PERSONAL HISTORY OF GASTRIC BANDING: ICD-10-CM

## 2024-01-10 PROBLEM — E66.813 CLASS 3 SEVERE OBESITY WITH SERIOUS COMORBIDITY AND BODY MASS INDEX (BMI) OF 40.0 TO 44.9 IN ADULT: Status: ACTIVE | Noted: 2024-01-10

## 2024-01-10 LAB
ALBUMIN SERPL-MCNC: 4 G/DL (ref 3.5–5.2)
ALBUMIN/GLOB SERPL: 1.5 G/DL
ALP SERPL-CCNC: 69 U/L (ref 39–117)
ALT SERPL W P-5'-P-CCNC: 17 U/L (ref 1–41)
ANION GAP SERPL CALCULATED.3IONS-SCNC: 10.6 MMOL/L (ref 5–15)
AST SERPL-CCNC: 15 U/L (ref 1–40)
BILIRUB SERPL-MCNC: 0.5 MG/DL (ref 0–1.2)
BUN SERPL-MCNC: 17 MG/DL (ref 6–20)
BUN/CREAT SERPL: 18.5 (ref 7–25)
CALCIUM SPEC-SCNC: 9.2 MG/DL (ref 8.6–10.5)
CHLORIDE SERPL-SCNC: 108 MMOL/L (ref 98–107)
CHOLEST SERPL-MCNC: 178 MG/DL (ref 0–200)
CO2 SERPL-SCNC: 22.4 MMOL/L (ref 22–29)
CREAT SERPL-MCNC: 0.92 MG/DL (ref 0.76–1.27)
DEPRECATED RDW RBC AUTO: 40.4 FL (ref 37–54)
EGFRCR SERPLBLD CKD-EPI 2021: 98.2 ML/MIN/1.73
ERYTHROCYTE [DISTWIDTH] IN BLOOD BY AUTOMATED COUNT: 13.1 % (ref 12.3–15.4)
GLOBULIN UR ELPH-MCNC: 2.7 GM/DL
GLUCOSE SERPL-MCNC: 91 MG/DL (ref 65–99)
HBA1C MFR BLD: 5.4 % (ref 4.8–5.6)
HCT VFR BLD AUTO: 47.6 % (ref 37.5–51)
HDLC SERPL-MCNC: 31 MG/DL (ref 40–60)
HGB BLD-MCNC: 15.8 G/DL (ref 13–17.7)
LDLC SERPL CALC-MCNC: 104 MG/DL (ref 0–100)
LDLC/HDLC SERPL: 3.12 {RATIO}
MCH RBC QN AUTO: 28.3 PG (ref 26.6–33)
MCHC RBC AUTO-ENTMCNC: 33.2 G/DL (ref 31.5–35.7)
MCV RBC AUTO: 85.2 FL (ref 79–97)
PLATELET # BLD AUTO: 240 10*3/MM3 (ref 140–450)
PMV BLD AUTO: 10.6 FL (ref 6–12)
POTASSIUM SERPL-SCNC: 4.2 MMOL/L (ref 3.5–5.2)
PROT SERPL-MCNC: 6.7 G/DL (ref 6–8.5)
PSA SERPL-MCNC: 2.44 NG/ML (ref 0–4)
RBC # BLD AUTO: 5.59 10*6/MM3 (ref 4.14–5.8)
SODIUM SERPL-SCNC: 141 MMOL/L (ref 136–145)
TRIGL SERPL-MCNC: 251 MG/DL (ref 0–150)
TSH SERPL DL<=0.05 MIU/L-ACNC: 1.11 UIU/ML (ref 0.27–4.2)
VLDLC SERPL-MCNC: 43 MG/DL (ref 5–40)
WBC NRBC COR # BLD AUTO: 8.81 10*3/MM3 (ref 3.4–10.8)

## 2024-01-10 PROCEDURE — 83036 HEMOGLOBIN GLYCOSYLATED A1C: CPT | Performed by: NURSE PRACTITIONER

## 2024-01-10 PROCEDURE — G0103 PSA SCREENING: HCPCS | Performed by: NURSE PRACTITIONER

## 2024-01-10 PROCEDURE — 80050 GENERAL HEALTH PANEL: CPT | Performed by: NURSE PRACTITIONER

## 2024-01-10 PROCEDURE — 80061 LIPID PANEL: CPT | Performed by: NURSE PRACTITIONER

## 2024-01-10 RX ORDER — MELOXICAM 7.5 MG/1
7.5 TABLET ORAL DAILY
Qty: 30 TABLET | Refills: 1 | Status: SHIPPED | OUTPATIENT
Start: 2024-01-10

## 2024-01-10 RX ORDER — PANTOPRAZOLE SODIUM 40 MG/1
40 TABLET, DELAYED RELEASE ORAL DAILY
Qty: 90 TABLET | Refills: 2 | Status: SHIPPED | OUTPATIENT
Start: 2024-01-10

## 2024-01-10 NOTE — PROGRESS NOTES
Venipuncture Blood Specimen Collection  Venipuncture performed in the left arm by Shaista Jackson MA with good hemostasis. Patient tolerated the procedure well without complications.   01/10/24   Shaista Jackson MA

## 2024-01-10 NOTE — PROGRESS NOTES
Chief Complaint  Annual Exam (Would like to discuss changing Celebrex to Mobic.) and Follow-up (Would like to discuss having gastric band removed and possibly having sleeve or something different done due to reflux.)    Subjective        Jaime Lyle presents to [facility]  History of Present Illness    Patient presents for Annual Exam. PMH includes GERD, STEVE, Obesity, OA. Patient is taking Protonix 40 mg daily - reports symptoms are well controlled. He recently had left meniscus repair, prescribed Celebrex 200 mg daily but reports that seems to increase his heartburn.  Patient reports he recently vacationed and forgot his Celebrex at home -he took a dose of wife's Mobic and reports worked well for joint pain and had less heartburn.  Patient requesting prescription.  Patient has no complaints of dizziness, headache, vision changes, chest pain, swelling, cough or dyspnea.  He has no GI or  complaints.  Patient has not had Colonoscopy, previously referred for follow-up was lost.  Patient previously had gastric banding.  He is interested in consultation for gastric sleeve.    PHQ-9 Depression Screening  Little interest or pleasure in doing things? 0-->not at all   Feeling down, depressed, or hopeless? 0-->not at all   Trouble falling or staying asleep, or sleeping too much?     Feeling tired or having little energy?     Poor appetite or overeating?     Feeling bad about yourself - or that you are a failure or have let yourself or your family down?     Trouble concentrating on things, such as reading the newspaper or watching television?     Moving or speaking so slowly that other people could have noticed? Or the opposite - being so fidgety or restless that you have been moving around a lot more than usual?     Thoughts that you would be better off dead, or of hurting yourself in some way?     PHQ-9 Total Score 0   If you checked off any problems, how difficult have these problems  "made it for you to do your work, take care of things at home, or get along with other people?          Objective   Vital Signs:  /80 (BP Location: Left arm, Patient Position: Sitting, Cuff Size: Large Adult)   Pulse 68   Ht 180.3 cm (71\")   Wt 130 kg (287 lb)   SpO2 98%   BMI 40.03 kg/m²   Estimated body mass index is 40.03 kg/m² as calculated from the following:    Height as of this encounter: 180.3 cm (71\").    Weight as of this encounter: 130 kg (287 lb).       Class 3 Severe Obesity (BMI >=40). Obesity-related health conditions include the following: GERD. Obesity is unchanged. BMI is is above average; BMI management plan is completed. We discussed portion control and increasing exercise.      Physical Exam  Constitutional:       Appearance: Normal appearance.   HENT:      Head: Normocephalic.      Right Ear: Tympanic membrane normal.      Left Ear: Tympanic membrane normal.      Mouth/Throat:      Pharynx: Oropharynx is clear.   Cardiovascular:      Rate and Rhythm: Normal rate and regular rhythm.   Pulmonary:      Effort: Pulmonary effort is normal.      Breath sounds: Normal breath sounds.   Abdominal:      General: Abdomen is flat. Bowel sounds are normal.      Palpations: Abdomen is soft.   Musculoskeletal:         General: Normal range of motion.      Cervical back: Neck supple.      Right lower leg: No edema.      Left lower leg: No edema.   Skin:     General: Skin is warm and dry.   Neurological:      Mental Status: He is alert and oriented to person, place, and time.      Gait: Gait is intact.   Psychiatric:         Attention and Perception: Attention normal.         Mood and Affect: Mood normal.         Speech: Speech normal.        Result Review :    CMP          1/10/2024    14:58   CMP   Glucose 91    BUN 17    Creatinine 0.92    EGFR 98.2    Sodium 141    Potassium 4.2    Chloride 108    Calcium 9.2    Total Protein 6.7    Albumin 4.0    Globulin 2.7    Total Bilirubin 0.5    Alkaline " Phosphatase 69    AST (SGOT) 15    ALT (SGPT) 17    Albumin/Globulin Ratio 1.5    BUN/Creatinine Ratio 18.5    Anion Gap 10.6      CBC          9/27/2023    09:15 1/10/2024    14:58   CBC   WBC 7.90     8.81    RBC 5.92     5.59    Hemoglobin 16.5     15.8    Hematocrit 50.5     47.6    MCV 85.3     85.2    MCH 27.9     28.3    MCHC 32.7     33.2    RDW 13.2     13.1    Platelets 216     240       Details          This result is from an external source.             Lipid Panel          1/10/2024    14:58   Lipid Panel   Total Cholesterol 178    Triglycerides 251    HDL Cholesterol 31    VLDL Cholesterol 43    LDL Cholesterol  104    LDL/HDL Ratio 3.12      TSH          1/10/2024    14:58   TSH   TSH 1.110      Most Recent A1C          1/10/2024    14:58   HGBA1C Most Recent   Hemoglobin A1C 5.40      PSA          1/10/2024    14:58   PSA   PSA 2.440                   Assessment and Plan   Diagnoses and all orders for this visit:    1. Need for vaccination against Streptococcus pneumoniae (Primary)  -     Pneumococcal Conjugate Vaccine 20-Valent (PCV20)  -     Ambulatory Referral to Bariatric Surgery    2. Personal history of gastric banding  -     Ambulatory Referral to Bariatric Surgery    3. Class 3 severe obesity with serious comorbidity and body mass index (BMI) of 40.0 to 44.9 in adult, unspecified obesity type  Assessment & Plan:  Patient's (Body mass index is 40.03 kg/m².) indicates that they are morbidly/severely obese (BMI > 40 or > 35 with obesity - related health condition) with health conditions that include hypertension and GERD . Weight is worsening. BMI  is above average; BMI management plan is completed. We discussed portion control, increasing exercise, and consulting a Bariatric surgeon.     Orders:  -     Ambulatory Referral to Bariatric Surgery    4. Screening for malignant neoplasm of colon  -     Ambulatory Referral For Screening Colonoscopy    5. Annual physical exam  Assessment & Plan:  Labs  today  Well balanced diet  CDC recommends 150 minutes exercise weekly  Routine vision/dental screenings  Refer for Colonoscopy    Orders:  -     CBC (No Diff)  -     Comprehensive Metabolic Panel  -     Hemoglobin A1c  -     Lipid Panel  -     TSH    6. Screening for malignant neoplasm of prostate  -     PSA Screen    7. Chronic pain of left knee  Assessment & Plan:  D/C Celebrex  Start Mobic 7.5 mg daily      Other orders  -     meloxicam (Mobic) 7.5 MG tablet; Take 1 tablet by mouth Daily.  Dispense: 30 tablet; Refill: 1  -     pantoprazole (PROTONIX) 40 MG EC tablet; Take 1 tablet by mouth Daily.  Dispense: 90 tablet; Refill: 2           I spent 30 minutes caring for Jaime on this date of service. This time includes time spent by me in the following activities:preparing for the visit, reviewing tests, obtaining and/or reviewing a separately obtained history, performing a medically appropriate examination and/or evaluation , counseling and educating the patient/family/caregiver, ordering medications, tests, or procedures, referring and communicating with other health care professionals , documenting information in the medical record, and care coordination  Follow Up   Return in about 1 year (around 1/10/2025) for Annual physical.  Patient was given instructions and counseling regarding his condition or for health maintenance advice. Please see specific information pulled into the AVS if appropriate.     Counseling was given to patient for the following topics: instructions for management, risk factor reductions, prognosis, patient and family education, impressions, risks and benefits of treatment options, and importance of treatment compliance . Total time of the encounter was 25 minutes and 5 minutes was spent counseling.

## 2024-01-11 PROBLEM — M25.562 CHRONIC PAIN OF LEFT KNEE: Status: ACTIVE | Noted: 2024-01-11

## 2024-01-11 PROBLEM — Z00.00 ANNUAL PHYSICAL EXAM: Status: ACTIVE | Noted: 2024-01-11

## 2024-01-11 PROBLEM — G89.29 CHRONIC PAIN OF LEFT KNEE: Status: ACTIVE | Noted: 2024-01-11

## 2024-01-11 NOTE — ASSESSMENT & PLAN NOTE
Patient's (Body mass index is 40.03 kg/m².) indicates that they are morbidly/severely obese (BMI > 40 or > 35 with obesity - related health condition) with health conditions that include hypertension and GERD . Weight is worsening. BMI  is above average; BMI management plan is completed. We discussed portion control, increasing exercise, and consulting a Bariatric surgeon.

## 2024-01-11 NOTE — ASSESSMENT & PLAN NOTE
Labs today  Well balanced diet  CDC recommends 150 minutes exercise weekly  Routine vision/dental screenings  Refer for Colonoscopy

## 2024-02-03 NOTE — PROGRESS NOTES
Problem: ABCDS Injury Assessment  Goal: Absence of physical injury  Outcome: Progressing     Problem: Pain  Goal: Verbalizes/displays adequate comfort level or baseline comfort level  Outcome: Progressing      "Chief Complaint  Annual Exam and Edema (has noticed some swelling in his feet for about a month. )    Subjective          Jaime Lyle presents to [facility]  History of Present Illness    Patient presents for annual exam and to establish care with provider.  He is a former patient of Dr. Lynne.  Patient has not been seen in office since 2019.  Patient is taking omeprazole 40 mg daily for GERD.  He complains of a chronic cough, worse after eating.  He also complains of indigestion.  In addition to omeprazole, patient reports he takes \"handfuls of Tums\" to assist in managing symptoms.  Patient also has occasional nausea but denies vomiting.  Patient has no bowel complaints.  Patient also presents with palpitations.  He has a known history of PVCs.  Patient reports he has seen cardiology in the past and they advised him to decrease caffeine.  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  He denies chest pain, dizziness, headache, syncope, dyspnea.  He does have c/o swelling to bilateral feet.  Patient denies any redness, pain or warmth.  He has had swelling for approximately 1 month.    Objective   Vital Signs:   /80 (BP Location: Left arm, Patient Position: Sitting, Cuff Size: Large Adult)   Pulse 67   Ht 180.3 cm (71\")   Wt 127 kg (280 lb)   SpO2 98%   BMI 39.05 kg/m²       Physical Exam  Constitutional:       Appearance: Normal appearance.   HENT:      Head: Normocephalic.   Cardiovascular:      Rate and Rhythm: Normal rate. Frequent extrasystoles are present.     Comments: 1+ edema to bilateral feet  Pulmonary:      Effort: Pulmonary effort is normal.      Breath sounds: Normal breath sounds.   Abdominal:      General: Abdomen is flat. Bowel sounds are normal.      Palpations: Abdomen is soft.   Musculoskeletal:         General: Normal range of motion.      Cervical back: Neck supple.      " Right lower leg: Edema present.      Left lower leg: Edema present.   Skin:     General: Skin is warm and dry.   Neurological:      Mental Status: He is alert and oriented to person, place, and time.      Gait: Gait is intact.   Psychiatric:         Attention and Perception: Attention normal.         Mood and Affect: Mood normal.         Speech: Speech normal.        Result Review :                 Assessment and Plan    Diagnoses and all orders for this visit:    1. Tobacco use (Primary)    2. Essential hypertension  Assessment & Plan:  1.  No known history of hypertension  2.  Repeat manual blood pressure was 142/80  3.  Encouraged patient to decrease caffeine intake and sodium intake    Orders:  -     CBC & Differential  -     Comprehensive Metabolic Panel    3. Localized edema  Assessment & Plan:  1.  Decrease sodium intake  2.  Advised patient to start wearing compression hose  3.  Check labs  4.  Consider HCTZ, will call patient with results and plan of care once available      4. Preventative health care  -     Hepatitis C Antibody  -     Lipid Panel  -     TSH    5. Screening for colon cancer  -     Ambulatory Referral For Screening Colonoscopy    6. Palpitations  Assessment & Plan:  1.  EKG shows normal sinus rhythm with frequent premature ventricular contractions    Orders:  -     ECG 12 Lead  -     BNP    7. Screening for malignant neoplasm of prostate  -     PSA Screen    8. Hyperglycemia  Assessment & Plan:  1.  Patient reports family history of type 2 diabetes, requesting testing    Orders:  -     Hemoglobin A1c    9. Frequent PVCs  Assessment & Plan:  1.  Decrease caffeine intake, as advised previously  2.  Refer to cardiology for further consultation because patient is symptomatic  3.  He has a history of bradycardia, reportedly frequently as low as 47 bpm     Orders:  -     Ambulatory Referral to Cardiology    Other orders  -     pantoprazole (Protonix) 40 MG EC tablet; Take 1 tablet by mouth Daily.   Dispense: 90 tablet; Refill: 0    I spent 30 minutes caring for Jaime on this date of service. This time includes time spent by me in the following activities:preparing for the visit, reviewing tests, obtaining and/or reviewing a separately obtained history, performing a medically appropriate examination and/or evaluation , counseling and educating the patient/family/caregiver, ordering medications, tests, or procedures, referring and communicating with other health care professionals , documenting information in the medical record and care coordination  Follow Up   Return in about 3 months (around 9/8/2021).  Patient was given instructions and counseling regarding his condition or for health maintenance advice. Please see specific information pulled into the AVS if appropriate.

## 2024-03-07 ENCOUNTER — AMBULATORY SURGICAL CENTER (OUTPATIENT)
Dept: URBAN - METROPOLITAN AREA SURGERY 20 | Facility: SURGERY | Age: 54
End: 2024-03-07
Payer: COMMERCIAL

## 2024-03-07 ENCOUNTER — OFFICE (OUTPATIENT)
Dept: URBAN - METROPOLITAN AREA PATHOLOGY 4 | Facility: PATHOLOGY | Age: 54
End: 2024-03-07

## 2024-03-07 DIAGNOSIS — K64.1 SECOND DEGREE HEMORRHOIDS: ICD-10-CM

## 2024-03-07 DIAGNOSIS — K63.5 POLYP OF COLON: ICD-10-CM

## 2024-03-07 DIAGNOSIS — Z12.11 ENCOUNTER FOR SCREENING FOR MALIGNANT NEOPLASM OF COLON: ICD-10-CM

## 2024-03-07 DIAGNOSIS — K57.30 DIVERTICULOSIS OF LARGE INTESTINE WITHOUT PERFORATION OR ABS: ICD-10-CM

## 2024-03-07 LAB
GI HISTOLOGY: A. RECTO-SIGMOID POLYP: (no result)
GI HISTOLOGY: PDF REPORT: (no result)

## 2024-03-07 PROCEDURE — 88305 TISSUE EXAM BY PATHOLOGIST: CPT | Performed by: PATHOLOGY

## 2024-03-07 PROCEDURE — 45380 COLONOSCOPY AND BIOPSY: CPT | Mod: 33 | Performed by: INTERNAL MEDICINE

## 2024-07-01 RX ORDER — HYDROCHLOROTHIAZIDE 25 MG/1
25 TABLET ORAL DAILY
Qty: 30 TABLET | Refills: 1 | Status: SHIPPED | OUTPATIENT
Start: 2024-07-01

## 2024-08-15 RX ORDER — CEFDINIR 300 MG/1
300 CAPSULE ORAL 2 TIMES DAILY
Qty: 14 CAPSULE | Refills: 0 | Status: SHIPPED | OUTPATIENT
Start: 2024-08-15

## 2024-08-15 RX ORDER — METHYLPREDNISOLONE 4 MG/1
TABLET ORAL
Qty: 21 TABLET | Refills: 0 | Status: SHIPPED | OUTPATIENT
Start: 2024-08-15

## 2024-08-15 RX ORDER — ALBUTEROL SULFATE 90 UG/1
2 AEROSOL, METERED RESPIRATORY (INHALATION) EVERY 4 HOURS PRN
Qty: 8 G | Refills: 0 | Status: SHIPPED | OUTPATIENT
Start: 2024-08-15

## 2024-08-27 RX ORDER — HYDROCHLOROTHIAZIDE 25 MG/1
25 TABLET ORAL DAILY
Qty: 30 TABLET | Refills: 1 | Status: SHIPPED | OUTPATIENT
Start: 2024-08-27

## 2024-09-11 ENCOUNTER — LAB (OUTPATIENT)
Dept: FAMILY MEDICINE CLINIC | Facility: CLINIC | Age: 54
End: 2024-09-11
Payer: COMMERCIAL

## 2024-09-11 ENCOUNTER — OFFICE VISIT (OUTPATIENT)
Dept: FAMILY MEDICINE CLINIC | Facility: CLINIC | Age: 54
End: 2024-09-11
Payer: COMMERCIAL

## 2024-09-11 VITALS
WEIGHT: 284 LBS | SYSTOLIC BLOOD PRESSURE: 124 MMHG | BODY MASS INDEX: 39.76 KG/M2 | HEART RATE: 68 BPM | OXYGEN SATURATION: 97 % | HEIGHT: 71 IN | DIASTOLIC BLOOD PRESSURE: 78 MMHG

## 2024-09-11 DIAGNOSIS — G89.29 CHRONIC PAIN OF LEFT KNEE: ICD-10-CM

## 2024-09-11 DIAGNOSIS — E55.9 VITAMIN D DEFICIENCY: ICD-10-CM

## 2024-09-11 DIAGNOSIS — I10 ESSENTIAL HYPERTENSION: Primary | ICD-10-CM

## 2024-09-11 DIAGNOSIS — K21.9 GASTROESOPHAGEAL REFLUX DISEASE, UNSPECIFIED WHETHER ESOPHAGITIS PRESENT: ICD-10-CM

## 2024-09-11 DIAGNOSIS — Z00.00 PREVENTATIVE HEALTH CARE: ICD-10-CM

## 2024-09-11 DIAGNOSIS — R60.0 LOCALIZED EDEMA: ICD-10-CM

## 2024-09-11 DIAGNOSIS — R53.82 CHRONIC FATIGUE: ICD-10-CM

## 2024-09-11 DIAGNOSIS — R06.83 SNORING: ICD-10-CM

## 2024-09-11 DIAGNOSIS — M25.562 CHRONIC PAIN OF LEFT KNEE: ICD-10-CM

## 2024-09-11 LAB
DEPRECATED RDW RBC AUTO: 42.5 FL (ref 37–54)
ERYTHROCYTE [DISTWIDTH] IN BLOOD BY AUTOMATED COUNT: 13.5 % (ref 12.3–15.4)
HCT VFR BLD AUTO: 53.3 % (ref 37.5–51)
HGB BLD-MCNC: 17.1 G/DL (ref 13–17.7)
MCH RBC QN AUTO: 27.8 PG (ref 26.6–33)
MCHC RBC AUTO-ENTMCNC: 32.1 G/DL (ref 31.5–35.7)
MCV RBC AUTO: 86.7 FL (ref 79–97)
PLATELET # BLD AUTO: 248 10*3/MM3 (ref 140–450)
PMV BLD AUTO: 10.7 FL (ref 6–12)
RBC # BLD AUTO: 6.15 10*6/MM3 (ref 4.14–5.8)
WBC NRBC COR # BLD AUTO: 8.66 10*3/MM3 (ref 3.4–10.8)

## 2024-09-11 PROCEDURE — 99214 OFFICE O/P EST MOD 30 MIN: CPT | Performed by: NURSE PRACTITIONER

## 2024-09-11 PROCEDURE — 82728 ASSAY OF FERRITIN: CPT | Performed by: NURSE PRACTITIONER

## 2024-09-11 PROCEDURE — 80050 GENERAL HEALTH PANEL: CPT | Performed by: NURSE PRACTITIONER

## 2024-09-11 PROCEDURE — 36415 COLL VENOUS BLD VENIPUNCTURE: CPT | Performed by: NURSE PRACTITIONER

## 2024-09-11 PROCEDURE — 82607 VITAMIN B-12: CPT | Performed by: NURSE PRACTITIONER

## 2024-09-11 PROCEDURE — 83036 HEMOGLOBIN GLYCOSYLATED A1C: CPT | Performed by: NURSE PRACTITIONER

## 2024-09-11 PROCEDURE — 84466 ASSAY OF TRANSFERRIN: CPT | Performed by: NURSE PRACTITIONER

## 2024-09-11 PROCEDURE — 83540 ASSAY OF IRON: CPT | Performed by: NURSE PRACTITIONER

## 2024-09-11 PROCEDURE — 82306 VITAMIN D 25 HYDROXY: CPT | Performed by: NURSE PRACTITIONER

## 2024-09-11 RX ORDER — MELOXICAM 15 MG/1
15 TABLET ORAL DAILY
Qty: 90 TABLET | Refills: 1 | Status: SHIPPED | OUTPATIENT
Start: 2024-09-11

## 2024-09-11 RX ORDER — PANTOPRAZOLE SODIUM 40 MG/1
40 TABLET, DELAYED RELEASE ORAL 2 TIMES DAILY
Qty: 180 TABLET | Refills: 0 | Status: SHIPPED | OUTPATIENT
Start: 2024-09-11 | End: 2024-12-10

## 2024-09-11 NOTE — PROGRESS NOTES
"Chief Complaint  Follow-up (Wanted to get a check up before leaving for [place]/needing refill on meloxicam ) and Fatigue (No matter how much sleep/concerns about thyroid because he is also struggling to lose weight and having a lower heart rate )    Subjective        Jaime Lyle presents to [facility]  History of Present Illness    Patient presents for f/u visit.     Hypertension with lower extremity swelling, taking HCTZ 25 mg daily. Blood pressure is stable. Patient reports swelling also improved. He has no acute complaints. Patient denies dizziness, headache, chest pain, edema, cough or dyspnea.     Chronic left knee pain, taking Mobic PRN.     Patient is c/o chronic fatigue, can't get rested. He gets approximately 5-6 hours of sleep. Patient cannot stay asleep. He snores, wakes up with acid in his throat. GERD, prescribed Protonix 40 mg BID.     Objective   Vital Signs:  /78 (BP Location: Left arm, Patient Position: Sitting, Cuff Size: Large Adult)   Pulse 68   Ht 180.3 cm (71\")   Wt 129 kg (284 lb)   SpO2 97%   BMI 39.61 kg/m²   Estimated body mass index is 39.61 kg/m² as calculated from the following:    Height as of this encounter: 180.3 cm (71\").    Weight as of this encounter: 129 kg (284 lb).          Physical Exam  Constitutional:       Appearance: Normal appearance.   HENT:      Head: Normocephalic.   Cardiovascular:      Rate and Rhythm: Normal rate and regular rhythm.   Pulmonary:      Effort: Pulmonary effort is normal.      Breath sounds: Normal breath sounds.   Abdominal:      General: Abdomen is flat. Bowel sounds are normal.      Palpations: Abdomen is soft.   Musculoskeletal:         General: Normal range of motion.      Cervical back: Neck supple.      Right lower leg: No edema.      Left lower leg: No edema.   Skin:     General: Skin is warm and dry.   Neurological:      Mental Status: He is alert and oriented to person, place, and time.      Gait: " Gait is intact.   Psychiatric:         Attention and Perception: Attention normal.         Mood and Affect: Mood normal.         Speech: Speech normal.        Result Review :                Assessment and Plan   Diagnoses and all orders for this visit:    1. Essential hypertension (Primary)  Assessment & Plan:  At goal of < 140/90  Continue HCTZ as prescribed    Orders:  -     Home Sleep Study; Future    2. Localized edema  Assessment & Plan:  Stable, continue HCTZ      3. Vitamin D deficiency  -     Vitamin D,25-Hydroxy    4. Chronic fatigue  -     TSH  -     Vitamin B12  -     Iron Profile  -     Home Sleep Study; Future    5. Preventative health care  -     CBC (No Diff)  -     Comprehensive Metabolic Panel  -     Hemoglobin A1c    6. Snoring  -     Home Sleep Study; Future    7. Chronic pain of left knee  -     meloxicam (Mobic) 15 MG tablet; Take 1 tablet by mouth Daily.  Dispense: 90 tablet; Refill: 1    8. Gastroesophageal reflux disease, unspecified whether esophagitis present  Assessment & Plan:  Continue Protonix as prescribed.  Patient to contact Dr. Latham regarding possible need for surgical correction of lapband    Orders:  -     pantoprazole (PROTONIX) 40 MG EC tablet; Take 1 tablet by mouth 2 (Two) Times a Day for 90 days.  Dispense: 180 tablet; Refill: 0           I spent 30 minutes caring for Jaime on this date of service. This time includes time spent by me in the following activities:preparing for the visit, reviewing tests, obtaining and/or reviewing a separately obtained history, performing a medically appropriate examination and/or evaluation , counseling and educating the patient/family/caregiver, ordering medications, tests, or procedures, documenting information in the medical record, and care coordination  Follow Up   Return for Next scheduled follow up.  Patient was given instructions and counseling regarding his condition or for health maintenance advice. Please see specific information  pulled into the AVS if appropriate.

## 2024-09-12 DIAGNOSIS — D58.2 ELEVATED HEMOGLOBIN: Primary | ICD-10-CM

## 2024-09-12 LAB
25(OH)D3 SERPL-MCNC: 25.5 NG/ML (ref 30–100)
ALBUMIN SERPL-MCNC: 4.3 G/DL (ref 3.5–5.2)
ALBUMIN/GLOB SERPL: 1.5 G/DL
ALP SERPL-CCNC: 68 U/L (ref 39–117)
ALT SERPL W P-5'-P-CCNC: 24 U/L (ref 1–41)
ANION GAP SERPL CALCULATED.3IONS-SCNC: 8.9 MMOL/L (ref 5–15)
AST SERPL-CCNC: 17 U/L (ref 1–40)
BILIRUB SERPL-MCNC: 0.8 MG/DL (ref 0–1.2)
BUN SERPL-MCNC: 11 MG/DL (ref 6–20)
BUN/CREAT SERPL: 11.1 (ref 7–25)
CALCIUM SPEC-SCNC: 9.7 MG/DL (ref 8.6–10.5)
CHLORIDE SERPL-SCNC: 107 MMOL/L (ref 98–107)
CO2 SERPL-SCNC: 24.1 MMOL/L (ref 22–29)
CREAT SERPL-MCNC: 0.99 MG/DL (ref 0.76–1.27)
EGFRCR SERPLBLD CKD-EPI 2021: 90 ML/MIN/1.73
FERRITIN SERPL-MCNC: 291 NG/ML (ref 30–400)
GLOBULIN UR ELPH-MCNC: 2.9 GM/DL
GLUCOSE SERPL-MCNC: 95 MG/DL (ref 65–99)
HBA1C MFR BLD: 5.2 % (ref 4.8–5.6)
IRON 24H UR-MRATE: 75 MCG/DL (ref 59–158)
IRON SATN MFR SERPL: 20 % (ref 20–50)
POTASSIUM SERPL-SCNC: 4.5 MMOL/L (ref 3.5–5.2)
PROT SERPL-MCNC: 7.2 G/DL (ref 6–8.5)
SODIUM SERPL-SCNC: 140 MMOL/L (ref 136–145)
TIBC SERPL-MCNC: 371 MCG/DL (ref 298–536)
TRANSFERRIN SERPL-MCNC: 249 MG/DL (ref 200–360)
TSH SERPL DL<=0.05 MIU/L-ACNC: 1.16 UIU/ML (ref 0.27–4.2)
VIT B12 BLD-MCNC: 776 PG/ML (ref 211–946)

## 2024-09-12 NOTE — ASSESSMENT & PLAN NOTE
Continue Protonix as prescribed.  Patient to contact Dr. Latham regarding possible need for surgical correction of lapband

## 2024-10-09 DIAGNOSIS — K21.9 GASTROESOPHAGEAL REFLUX DISEASE, UNSPECIFIED WHETHER ESOPHAGITIS PRESENT: ICD-10-CM

## 2024-10-09 RX ORDER — PANTOPRAZOLE SODIUM 40 MG/1
40 TABLET, DELAYED RELEASE ORAL DAILY
Qty: 90 TABLET | OUTPATIENT
Start: 2024-10-09

## 2024-10-15 DIAGNOSIS — G47.33 OSA (OBSTRUCTIVE SLEEP APNEA): Primary | ICD-10-CM

## 2024-11-04 RX ORDER — HYDROCHLOROTHIAZIDE 25 MG/1
25 TABLET ORAL DAILY
Qty: 30 TABLET | Refills: 1 | Status: SHIPPED | OUTPATIENT
Start: 2024-11-04

## 2024-12-12 DIAGNOSIS — K21.9 GASTROESOPHAGEAL REFLUX DISEASE, UNSPECIFIED WHETHER ESOPHAGITIS PRESENT: ICD-10-CM

## 2024-12-12 RX ORDER — PANTOPRAZOLE SODIUM 40 MG/1
40 TABLET, DELAYED RELEASE ORAL 2 TIMES DAILY
Qty: 180 TABLET | Refills: 0 | Status: SHIPPED | OUTPATIENT
Start: 2024-12-12

## 2024-12-17 ENCOUNTER — TELEPHONE (OUTPATIENT)
Dept: FAMILY MEDICINE CLINIC | Facility: CLINIC | Age: 54
End: 2024-12-17
Payer: COMMERCIAL

## 2024-12-17 NOTE — TELEPHONE ENCOUNTER
I spoke with Lucrecia at [facility] and he stated that the information that was told to us yesterday was incorrect. He stated that they need an order from you if pressures need to be changed and the person I spoke with yesterday said they just needed the original order. He said they need an actual order with whatever the changes need to be for his settings. When asked what we need to put on this order since it is auto titration he could not provide an answer. He said he was going to fax compliance information for review and he said we would have to order based on that information.

## 2024-12-17 NOTE — TELEPHONE ENCOUNTER
Lucrecia with [facility] called ..Jaime called them to change the pressure on his CPAP, they said they couldn't do it without your consent, he was upset. Lucrecia thought someone should check with him.  Jaime said he would just bring the machine back.

## 2024-12-17 NOTE — TELEPHONE ENCOUNTER
Noted.  Please reach out to the patient and see what he needs.  His machine auto titrates.  I can lower the higher setting so that there is overall less pressure but I want to double check with him before I send a new order and adjust the machine incorrectly

## 2024-12-18 DIAGNOSIS — G47.33 OSA (OBSTRUCTIVE SLEEP APNEA): Primary | ICD-10-CM

## 2024-12-18 NOTE — TELEPHONE ENCOUNTER
[facility] has this order and will send to [facility] in [place]. Pt made aware that we will be sending this order to [facility] for adjustment.

## 2024-12-18 NOTE — TELEPHONE ENCOUNTER
Spoke with patient and he is wanting the settings decreased because he feels that it is too high. He said he has a lot of gas build up and dryness and everything that he is reading is indicating the settings are too high. Please advise.

## 2024-12-18 NOTE — TELEPHONE ENCOUNTER
I have placed an order for PAP supplies only, decreasing the auto titrating settings from 4-16, down from 4-20

## 2025-01-15 RX ORDER — HYDROCHLOROTHIAZIDE 25 MG/1
25 TABLET ORAL DAILY
Qty: 30 TABLET | Refills: 1 | Status: SHIPPED | OUTPATIENT
Start: 2025-01-15

## 2025-01-24 ENCOUNTER — OFFICE VISIT (OUTPATIENT)
Dept: FAMILY MEDICINE CLINIC | Facility: CLINIC | Age: 55
End: 2025-01-24
Payer: COMMERCIAL

## 2025-01-24 VITALS
BODY MASS INDEX: 38.24 KG/M2 | WEIGHT: 298 LBS | SYSTOLIC BLOOD PRESSURE: 142 MMHG | OXYGEN SATURATION: 98 % | HEART RATE: 75 BPM | HEIGHT: 74 IN | DIASTOLIC BLOOD PRESSURE: 88 MMHG

## 2025-01-24 DIAGNOSIS — K21.9 GASTROESOPHAGEAL REFLUX DISEASE, UNSPECIFIED WHETHER ESOPHAGITIS PRESENT: ICD-10-CM

## 2025-01-24 DIAGNOSIS — I10 ESSENTIAL HYPERTENSION: ICD-10-CM

## 2025-01-24 DIAGNOSIS — Z00.00 ANNUAL PHYSICAL EXAM: Primary | ICD-10-CM

## 2025-01-24 DIAGNOSIS — Z12.5 SCREENING FOR MALIGNANT NEOPLASM OF PROSTATE: ICD-10-CM

## 2025-01-24 DIAGNOSIS — G47.33 OSA (OBSTRUCTIVE SLEEP APNEA): ICD-10-CM

## 2025-01-24 LAB
DEPRECATED RDW RBC AUTO: 38.8 FL (ref 37–54)
ERYTHROCYTE [DISTWIDTH] IN BLOOD BY AUTOMATED COUNT: 12.9 % (ref 12.3–15.4)
HCT VFR BLD AUTO: 50 % (ref 37.5–51)
HGB BLD-MCNC: 16.8 G/DL (ref 13–17.7)
MCH RBC QN AUTO: 28 PG (ref 26.6–33)
MCHC RBC AUTO-ENTMCNC: 33.6 G/DL (ref 31.5–35.7)
MCV RBC AUTO: 83.3 FL (ref 79–97)
PLATELET # BLD AUTO: 251 10*3/MM3 (ref 140–450)
PMV BLD AUTO: 10.2 FL (ref 6–12)
RBC # BLD AUTO: 6 10*6/MM3 (ref 4.14–5.8)
WBC NRBC COR # BLD AUTO: 8.28 10*3/MM3 (ref 3.4–10.8)

## 2025-01-24 PROCEDURE — 36415 COLL VENOUS BLD VENIPUNCTURE: CPT | Performed by: NURSE PRACTITIONER

## 2025-01-24 PROCEDURE — G0103 PSA SCREENING: HCPCS | Performed by: NURSE PRACTITIONER

## 2025-01-24 PROCEDURE — 83036 HEMOGLOBIN GLYCOSYLATED A1C: CPT | Performed by: NURSE PRACTITIONER

## 2025-01-24 PROCEDURE — 80061 LIPID PANEL: CPT | Performed by: NURSE PRACTITIONER

## 2025-01-24 PROCEDURE — 99396 PREV VISIT EST AGE 40-64: CPT | Performed by: NURSE PRACTITIONER

## 2025-01-24 PROCEDURE — 80050 GENERAL HEALTH PANEL: CPT | Performed by: NURSE PRACTITIONER

## 2025-01-24 RX ORDER — ALBUTEROL SULFATE 90 UG/1
2 INHALANT RESPIRATORY (INHALATION) EVERY 4 HOURS PRN
Qty: 8 G | Refills: 11 | Status: SHIPPED | OUTPATIENT
Start: 2025-01-24

## 2025-01-24 NOTE — PROGRESS NOTES
Venipuncture Blood Specimen Collection  Venipuncture performed in the left arm by Shaista Jackson MA with good hemostasis. Patient tolerated the procedure well without complications.   01/24/25   Shaista Jackson MA

## 2025-01-24 NOTE — PROGRESS NOTES
Chief Complaint  Annual Exam    Subjective        Jaime Lyle presents to [facility]  History of Present Illness    Patient presents for Annual Exam. PMH includes STEVE, GERD, HTN and obesity.     Hypertension with lower extremity swelling, taking HCTZ 25 mg daily. Blood pressure is elevated today, has not had medication yet today. He has no acute complaints. Patient denies dizziness, headache, chest pain, edema, cough or dyspnea.     Chronic left knee pain, taking Mobic PRN. Patient is followed by Ortho, Dr. Velazquez. 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 Ortho is building a new brace and gel injections in hopes to avoid TKR.     GERD, stable on Protonix 40 mg but only taking PRN.     Sleep study revealed moderate STEVE. Patient received CPAP, wearing approximately 2 hours nightly. He reports having difficulty adjusting.     PHQ-9 Depression Screening  Little interest or pleasure in doing things? Almost all   Feeling down, depressed, or hopeless? Almost all   PHQ-2 Total Score 6   Trouble falling or staying asleep, or sleeping too much? Almost all   Feeling tired or having little energy? Almost all   Poor appetite or overeating? Almost all   Feeling bad about yourself - or that you are a failure or have let yourself or your family down? Not at all   Trouble concentrating on things, such as reading the newspaper or watching television? Almost all   Moving or speaking so slowly that other people could have noticed? Or the opposite - being so fidgety or restless that you have been moving around a lot more than usual? Not at all   Thoughts that you would be better off dead, or of hurting yourself in some way? Not at all   PHQ-9 Total Score 18   If you checked off any problems, how difficult have these problems made it for you to do your work, take care of things at home, or get along with other people? Somewhat difficult      Objective   Vital  You have a Home Care nurse visit planned for Saturday, 1/27/24. The nurse will contact you after discharge to confirm the appointment time. If you do not hear from the nurse by Saturday morning, please call [phone].       Warning Signs after Having a Baby    Keep this paper on your fridge or somewhere else where you can see it.    Call your provider if you have any of these symptoms up to 12 weeks after having your baby.    Thoughts of hurting yourself or your baby  Pain in your chest or trouble breathing  Severe headache not helped by pain medicine  Eyesight concerns (blurry vision, seeing spots or flashes of light, other changes to eyesight)  Fainting, shaking or other signs of a seizure    Call 9-1-1 if you feel that it is an emergency.     The symptoms below can happen to anyone after giving birth. They can be very serious. Call your provider if you have any of these warning signs.    My provider s phone number: _______________________    Losing too much blood (hemorrhage)    Call your provider if you soak through a pad in less than an hour or pass blood clots bigger than a golf ball. These may be signs that you are bleeding too much.    Blood clots in the legs or lungs    After you give birth, your body naturally clots its blood to help prevent blood loss. Sometimes this increased clotting can happen in other areas of the body, like the legs or lungs. This can block your blood flow and be very dangerous.     Call your provider if you:  Have a red, swollen spot on the back of your leg that is warm or painful when you touch it.   Are coughing up blood.     Infection    Call your provider if you have any of these symptoms:  Fever of 100.4 F (38 C) or higher.  Pain or redness around your stitches if you had an incision.   Any yellow, white, or green fluid coming from places where you had stitches or surgery.    Mood Problems (postpartum depression)    Many people feel sad or have mood changes after having a  "Signs:  /88 (BP Location: Left arm, Patient Position: Sitting, Cuff Size: Large Adult)   Pulse 75   Ht 188 cm (74\")   Wt 135 kg (298 lb)   SpO2 98%   BMI 38.26 kg/m²   Estimated body mass index is 38.26 kg/m² as calculated from the following:    Height as of this encounter: 188 cm (74\").    Weight as of this encounter: 135 kg (298 lb).    Class 2 Severe Obesity (BMI >=35 and <=39.9). Obesity-related health conditions include the following: hypertension and GERD. Obesity is worsening. BMI is is above average; BMI management plan is completed. We discussed portion control, increasing exercise, joining a fitness center or start home based exercise program, and management of depression/anxiety/stress to control compensatory eating.      Physical Exam  Constitutional:       Appearance: Normal appearance. He is obese.   HENT:      Head: Normocephalic.      Right Ear: Tympanic membrane normal.      Left Ear: Tympanic membrane normal.      Mouth/Throat:      Pharynx: Oropharynx is clear.   Cardiovascular:      Rate and Rhythm: Normal rate and regular rhythm.   Pulmonary:      Effort: Pulmonary effort is normal.      Breath sounds: Normal breath sounds.   Abdominal:      General: Abdomen is flat. Bowel sounds are normal.      Palpations: Abdomen is soft.   Musculoskeletal:         General: Normal range of motion.      Cervical back: Neck supple.      Right lower leg: No edema.      Left lower leg: No edema.   Skin:     General: Skin is warm and dry.   Neurological:      Mental Status: He is alert and oriented to person, place, and time.      Gait: Gait is intact.   Psychiatric:         Attention and Perception: Attention normal.         Mood and Affect: Mood normal.         Speech: Speech normal.        Result Review :    CMP          9/11/2024    10:13   CMP   Glucose 95    BUN 11    Creatinine 0.99    EGFR 90.0    Sodium 140    Potassium 4.5    Chloride 107    Calcium 9.7    Total Protein 7.2    Albumin 4.3  " baby. But for some people, these mood swings are worse.     Call your provider right away if you feel so anxious or nervous that you can't care for yourself or your baby.    Preeclampsia (high blood pressure)    Even if you didn't have high blood pressure when you were pregnant, you are at risk for the high blood pressure disease called preeclampsia. This risk can last up to 12 weeks after giving birth.     Call your provider if you have:   Pain on your right side under your rib cage  Sudden swelling in the hands and face    Remember: You know your body. If something doesn't feel right, get medical help.     For informational purposes only. Not to replace the advice of your health care provider. Copyright 2020 [facility]. All rights reserved. Clinically reviewed by KENNY Mayen-OB, MSN. [employer] [id number] - Rev 02/23.           Globulin 2.9    Total Bilirubin 0.8    Alkaline Phosphatase 68    AST (SGOT) 17    ALT (SGPT) 24    Albumin/Globulin Ratio 1.5    BUN/Creatinine Ratio 11.1    Anion Gap 8.9      CBC          9/11/2024    10:13   CBC   WBC 8.66    RBC 6.15    Hemoglobin 17.1    Hematocrit 53.3    MCV 86.7    MCH 27.8    MCHC 32.1    RDW 13.5    Platelets 248        TSH          9/11/2024    10:13   TSH   TSH 1.160      Most Recent A1C          9/11/2024    10:13   HGBA1C Most Recent   Hemoglobin A1C 5.20                Assessment and Plan   Diagnoses and all orders for this visit:    1. Annual physical exam (Primary)  Assessment & Plan:  Labs today  Well balanced diet recommended  CDC recommends 150 minutes exercise weekly  Routine vision and dental screenings advised  Colon CA screening up to date    Orders:  -     CBC (No Diff)  -     Comprehensive Metabolic Panel  -     Hemoglobin A1c  -     Lipid Panel  -     TSH  -     albuterol sulfate  (90 Base) MCG/ACT inhaler; Inhale 2 puffs Every 4 (Four) Hours As Needed for Wheezing.  Dispense: 8 g; Refill: 11    2. STEVE (obstructive sleep apnea)  Assessment & Plan:  Continue nightly use of CPAP  If cannot adjust, arrange for new mask fitting      3. Gastroesophageal reflux disease, unspecified whether esophagitis present  Assessment & Plan:  Stable on Protonix PRN      4. Essential hypertension  Assessment & Plan:  Hypertension is stable and controlled  Continue current treatment regimen.  Dietary sodium restriction.  Weight loss.  Regular aerobic exercise.  Ambulatory blood pressure monitoring.  Blood pressure will be reassessed in 6 months.      5. Screening for malignant neoplasm of prostate  -     PSA Screen           I spent 30 minutes caring for Jaime on this date of service. This time includes time spent by me in the following activities:preparing for the visit, reviewing tests, obtaining and/or reviewing a separately obtained history, performing a medically appropriate  examination and/or evaluation , counseling and educating the patient/family/caregiver, ordering medications, tests, or procedures, documenting information in the medical record, and care coordination  Follow Up   Return in about 6 months (around 7/24/2025) for HTN.  Patient was given instructions and counseling regarding his condition or for health maintenance advice. Please see specific information pulled into the AVS if appropriate.     Counseling was given to patient for the following topics: instructions for management, risk factor reductions, prognosis, patient and family education, impressions, risks and benefits of treatment options, and importance of treatment compliance . Total time of the encounter was 25 minutes and 5 minutes was spent counseling.

## 2025-01-24 NOTE — ASSESSMENT & PLAN NOTE
Labs today  Well balanced diet recommended  CDC recommends 150 minutes exercise weekly  Routine vision and dental screenings advised  Colon CA screening up to date

## 2025-01-25 LAB
ALBUMIN SERPL-MCNC: 4 G/DL (ref 3.5–5.2)
ALBUMIN/GLOB SERPL: 1.3 G/DL
ALP SERPL-CCNC: 65 U/L (ref 39–117)
ALT SERPL W P-5'-P-CCNC: 19 U/L (ref 1–41)
ANION GAP SERPL CALCULATED.3IONS-SCNC: 10 MMOL/L (ref 5–15)
AST SERPL-CCNC: 14 U/L (ref 1–40)
BILIRUB SERPL-MCNC: 0.6 MG/DL (ref 0–1.2)
BUN SERPL-MCNC: 15 MG/DL (ref 6–20)
BUN/CREAT SERPL: 17 (ref 7–25)
CALCIUM SPEC-SCNC: 9.9 MG/DL (ref 8.6–10.5)
CHLORIDE SERPL-SCNC: 105 MMOL/L (ref 98–107)
CHOLEST SERPL-MCNC: 205 MG/DL (ref 0–200)
CO2 SERPL-SCNC: 23 MMOL/L (ref 22–29)
CREAT SERPL-MCNC: 0.88 MG/DL (ref 0.76–1.27)
EGFRCR SERPLBLD CKD-EPI 2021: 100.9 ML/MIN/1.73
GLOBULIN UR ELPH-MCNC: 3.2 GM/DL
GLUCOSE SERPL-MCNC: 88 MG/DL (ref 65–99)
HBA1C MFR BLD: 5.4 % (ref 4.8–5.6)
HDLC SERPL-MCNC: 37 MG/DL (ref 40–60)
LDLC SERPL CALC-MCNC: 118 MG/DL (ref 0–100)
LDLC/HDLC SERPL: 2.99 {RATIO}
POTASSIUM SERPL-SCNC: 4.5 MMOL/L (ref 3.5–5.2)
PROT SERPL-MCNC: 7.2 G/DL (ref 6–8.5)
PSA SERPL-MCNC: 1.2 NG/ML (ref 0–4)
SODIUM SERPL-SCNC: 138 MMOL/L (ref 136–145)
TRIGL SERPL-MCNC: 287 MG/DL (ref 0–150)
TSH SERPL DL<=0.05 MIU/L-ACNC: 1.72 UIU/ML (ref 0.27–4.2)
VLDLC SERPL-MCNC: 50 MG/DL (ref 5–40)

## 2025-03-07 ENCOUNTER — LAB (OUTPATIENT)
Dept: FAMILY MEDICINE CLINIC | Facility: CLINIC | Age: 55
End: 2025-03-07
Payer: COMMERCIAL

## 2025-03-07 ENCOUNTER — HOSPITAL ENCOUNTER (OUTPATIENT)
Dept: GENERAL RADIOLOGY | Facility: HOSPITAL | Age: 55
Discharge: HOME | End: 2025-03-07
Admitting: NURSE PRACTITIONER
Payer: COMMERCIAL

## 2025-03-07 DIAGNOSIS — R63.5 WEIGHT GAIN: ICD-10-CM

## 2025-03-07 DIAGNOSIS — R22.43 LOCALIZED SWELLING OF BOTH LOWER LEGS: Primary | ICD-10-CM

## 2025-03-07 DIAGNOSIS — R06.01 ORTHOPNEA: ICD-10-CM

## 2025-03-07 PROCEDURE — 83880 ASSAY OF NATRIURETIC PEPTIDE: CPT | Performed by: NURSE PRACTITIONER

## 2025-03-07 PROCEDURE — 71046 X-RAY EXAM CHEST 2 VIEWS: CPT

## 2025-03-07 PROCEDURE — 80053 COMPREHEN METABOLIC PANEL: CPT | Performed by: NURSE PRACTITIONER

## 2025-03-07 PROCEDURE — 36415 COLL VENOUS BLD VENIPUNCTURE: CPT | Performed by: NURSE PRACTITIONER

## 2025-03-07 PROCEDURE — 85027 COMPLETE CBC AUTOMATED: CPT | Performed by: NURSE PRACTITIONER

## 2025-03-07 PROCEDURE — 83735 ASSAY OF MAGNESIUM: CPT | Performed by: NURSE PRACTITIONER

## 2025-03-08 LAB
ALBUMIN SERPL-MCNC: 4 G/DL (ref 3.5–5.2)
ALBUMIN/GLOB SERPL: 1.3 G/DL
ALP SERPL-CCNC: 65 U/L (ref 39–117)
ALT SERPL W P-5'-P-CCNC: 26 U/L (ref 1–41)
ANION GAP SERPL CALCULATED.3IONS-SCNC: 11 MMOL/L (ref 5–15)
AST SERPL-CCNC: 20 U/L (ref 1–40)
BILIRUB SERPL-MCNC: 0.5 MG/DL (ref 0–1.2)
BUN SERPL-MCNC: 20 MG/DL (ref 6–20)
BUN/CREAT SERPL: 20.4 (ref 7–25)
CALCIUM SPEC-SCNC: 9.7 MG/DL (ref 8.6–10.5)
CHLORIDE SERPL-SCNC: 101 MMOL/L (ref 98–107)
CO2 SERPL-SCNC: 26 MMOL/L (ref 22–29)
CREAT SERPL-MCNC: 0.98 MG/DL (ref 0.76–1.27)
DEPRECATED RDW RBC AUTO: 44.1 FL (ref 37–54)
EGFRCR SERPLBLD CKD-EPI 2021: 90.5 ML/MIN/1.73
ERYTHROCYTE [DISTWIDTH] IN BLOOD BY AUTOMATED COUNT: 14 % (ref 12.3–15.4)
GLOBULIN UR ELPH-MCNC: 3.1 GM/DL
GLUCOSE SERPL-MCNC: 120 MG/DL (ref 65–99)
HCT VFR BLD AUTO: 53.4 % (ref 37.5–51)
HGB BLD-MCNC: 17 G/DL (ref 13–17.7)
MAGNESIUM SERPL-MCNC: 2.3 MG/DL (ref 1.6–2.6)
MCH RBC QN AUTO: 27.6 PG (ref 26.6–33)
MCHC RBC AUTO-ENTMCNC: 31.8 G/DL (ref 31.5–35.7)
MCV RBC AUTO: 86.7 FL (ref 79–97)
NT-PROBNP SERPL-MCNC: 468 PG/ML (ref 0–900)
PLATELET # BLD AUTO: 283 10*3/MM3 (ref 140–450)
PMV BLD AUTO: 10.5 FL (ref 6–12)
POTASSIUM SERPL-SCNC: 4.4 MMOL/L (ref 3.5–5.2)
PROT SERPL-MCNC: 7.1 G/DL (ref 6–8.5)
RBC # BLD AUTO: 6.16 10*6/MM3 (ref 4.14–5.8)
SODIUM SERPL-SCNC: 138 MMOL/L (ref 136–145)
WBC NRBC COR # BLD AUTO: 9.16 10*3/MM3 (ref 3.4–10.8)

## 2025-03-10 RX ORDER — FUROSEMIDE 40 MG/1
40 TABLET ORAL DAILY
Qty: 5 TABLET | Refills: 0 | Status: SHIPPED | OUTPATIENT
Start: 2025-03-10 | End: 2025-03-13

## 2025-03-11 DIAGNOSIS — G89.29 CHRONIC PAIN OF LEFT KNEE: ICD-10-CM

## 2025-03-11 DIAGNOSIS — M25.562 CHRONIC PAIN OF LEFT KNEE: ICD-10-CM

## 2025-03-11 RX ORDER — MELOXICAM 15 MG/1
15 TABLET ORAL DAILY
Qty: 90 TABLET | Refills: 1 | Status: SHIPPED | OUTPATIENT
Start: 2025-03-11

## 2025-03-13 DIAGNOSIS — K21.9 GASTROESOPHAGEAL REFLUX DISEASE, UNSPECIFIED WHETHER ESOPHAGITIS PRESENT: ICD-10-CM

## 2025-03-13 RX ORDER — PANTOPRAZOLE SODIUM 40 MG/1
40 TABLET, DELAYED RELEASE ORAL 2 TIMES DAILY
Qty: 180 TABLET | Refills: 0 | Status: SHIPPED | OUTPATIENT
Start: 2025-03-13

## 2025-03-13 RX ORDER — HYDROCHLOROTHIAZIDE 25 MG/1
25 TABLET ORAL DAILY
Qty: 30 TABLET | Refills: 1 | Status: SHIPPED | OUTPATIENT
Start: 2025-03-13

## 2025-03-13 RX ORDER — FUROSEMIDE 40 MG/1
40 TABLET ORAL DAILY
Qty: 5 TABLET | Refills: 0 | Status: SHIPPED | OUTPATIENT
Start: 2025-03-13 | End: 2025-03-18

## 2025-03-13 NOTE — TELEPHONE ENCOUNTER
Spoke with patient and he said that he had not even started the Lasix until today because the first prescription was just available for  today. He said that things have improved some because he decreased his sodium intake significantly. He will let us know if things do not continue to improve.

## 2025-03-19 RX ORDER — POTASSIUM CHLORIDE 750 MG/1
10 TABLET, EXTENDED RELEASE ORAL DAILY
Qty: 30 TABLET | Refills: 2 | Status: SHIPPED | OUTPATIENT
Start: 2025-03-19

## 2025-03-19 RX ORDER — FUROSEMIDE 20 MG/1
20 TABLET ORAL DAILY
Qty: 30 TABLET | Refills: 1 | Status: SHIPPED | OUTPATIENT
Start: 2025-03-19 | End: 2025-04-18

## 2025-03-31 RX ORDER — FUROSEMIDE 40 MG/1
40 TABLET ORAL DAILY
Qty: 5 TABLET | Refills: 0 | OUTPATIENT
Start: 2025-03-31 | End: 2025-04-05

## 2025-04-08 RX ORDER — LISINOPRIL 10 MG/1
10 TABLET ORAL DAILY
Qty: 90 TABLET | Refills: 0 | Status: SHIPPED | OUTPATIENT
Start: 2025-04-08

## 2025-04-28 ENCOUNTER — HOSPITAL ENCOUNTER (OUTPATIENT)
Facility: HOSPITAL | Age: 55
Setting detail: OBSERVATION
Discharge: HOME | End: 2025-04-30
Attending: EMERGENCY MEDICINE | Admitting: STUDENT IN AN ORGANIZED HEALTH CARE EDUCATION/TRAINING PROGRAM
Payer: COMMERCIAL

## 2025-04-28 ENCOUNTER — APPOINTMENT (OUTPATIENT)
Dept: GENERAL RADIOLOGY | Facility: HOSPITAL | Age: 55
End: 2025-04-28
Payer: COMMERCIAL

## 2025-04-28 DIAGNOSIS — I48.91 ATRIAL FIBRILLATION WITH RVR: Primary | ICD-10-CM

## 2025-04-28 DIAGNOSIS — R79.89 ELEVATED BRAIN NATRIURETIC PEPTIDE (BNP) LEVEL: ICD-10-CM

## 2025-04-28 DIAGNOSIS — R79.89 ELEVATED TROPONIN: ICD-10-CM

## 2025-04-28 LAB
ALBUMIN SERPL-MCNC: 4 G/DL (ref 3.5–5.2)
ALBUMIN/GLOB SERPL: 1.3 G/DL
ALP SERPL-CCNC: 71 U/L (ref 39–117)
ALT SERPL W P-5'-P-CCNC: 24 U/L (ref 1–41)
ANION GAP SERPL CALCULATED.3IONS-SCNC: 12 MMOL/L (ref 5–15)
APTT PPP: 25.4 SECONDS (ref 22.7–35.4)
AST SERPL-CCNC: 17 U/L (ref 1–40)
BASOPHILS # BLD AUTO: 0.06 10*3/MM3 (ref 0–0.2)
BASOPHILS NFR BLD AUTO: 0.5 % (ref 0–1.5)
BILIRUB SERPL-MCNC: 0.6 MG/DL (ref 0–1.2)
BUN SERPL-MCNC: 18 MG/DL (ref 6–20)
BUN/CREAT SERPL: 15.5 (ref 7–25)
CALCIUM SPEC-SCNC: 9.2 MG/DL (ref 8.6–10.5)
CHLORIDE SERPL-SCNC: 103 MMOL/L (ref 98–107)
CLUMPED PLATELETS: PRESENT
CO2 SERPL-SCNC: 22 MMOL/L (ref 22–29)
CREAT SERPL-MCNC: 1.16 MG/DL (ref 0.76–1.27)
DEPRECATED RDW RBC AUTO: 42.5 FL (ref 37–54)
EGFRCR SERPLBLD CKD-EPI 2021: 73.9 ML/MIN/1.73
EOSINOPHIL # BLD AUTO: 0.19 10*3/MM3 (ref 0–0.4)
EOSINOPHIL NFR BLD AUTO: 1.5 % (ref 0.3–6.2)
ERYTHROCYTE [DISTWIDTH] IN BLOOD BY AUTOMATED COUNT: 14 % (ref 12.3–15.4)
GEN 5 1HR TROPONIN T REFLEX: 37 NG/L
GLOBULIN UR ELPH-MCNC: 3.2 GM/DL
GLUCOSE SERPL-MCNC: 120 MG/DL (ref 65–99)
HCT VFR BLD AUTO: 48.6 % (ref 37.5–51)
HGB BLD-MCNC: 15.9 G/DL (ref 13–17.7)
IMM GRANULOCYTES # BLD AUTO: 0.12 10*3/MM3 (ref 0–0.05)
IMM GRANULOCYTES NFR BLD AUTO: 1 % (ref 0–0.5)
INR PPP: 1.26 (ref 0.9–1.1)
LYMPHOCYTES # BLD AUTO: 3.24 10*3/MM3 (ref 0.7–3.1)
LYMPHOCYTES NFR BLD AUTO: 26.3 % (ref 19.6–45.3)
MAGNESIUM SERPL-MCNC: 2 MG/DL (ref 1.6–2.6)
MCH RBC QN AUTO: 27.8 PG (ref 26.6–33)
MCHC RBC AUTO-ENTMCNC: 32.7 G/DL (ref 31.5–35.7)
MCV RBC AUTO: 85 FL (ref 79–97)
MONOCYTES # BLD AUTO: 0.85 10*3/MM3 (ref 0.1–0.9)
MONOCYTES NFR BLD AUTO: 6.9 % (ref 5–12)
NEUTROPHILS NFR BLD AUTO: 63.8 % (ref 42.7–76)
NEUTROPHILS NFR BLD AUTO: 7.85 10*3/MM3 (ref 1.7–7)
NT-PROBNP SERPL-MCNC: 2059 PG/ML (ref 0–900)
PHOSPHATE SERPL-MCNC: 2.8 MG/DL (ref 2.5–4.5)
PLATELET # BLD AUTO: 247 10*3/MM3 (ref 140–450)
PMV BLD AUTO: 10.5 FL (ref 6–12)
POTASSIUM SERPL-SCNC: 3.4 MMOL/L (ref 3.5–5.2)
PROT SERPL-MCNC: 7.2 G/DL (ref 6–8.5)
PROTHROMBIN TIME: 15.8 SECONDS (ref 11.7–14.2)
RBC # BLD AUTO: 5.72 10*6/MM3 (ref 4.14–5.8)
RBC MORPH BLD: NORMAL
SODIUM SERPL-SCNC: 137 MMOL/L (ref 136–145)
TROPONIN T % DELTA: -10
TROPONIN T NUMERIC DELTA: -4 NG/L
TROPONIN T SERPL HS-MCNC: 41 NG/L
TSH SERPL DL<=0.05 MIU/L-ACNC: 2.82 UIU/ML (ref 0.27–4.2)
WBC MORPH BLD: NORMAL
WBC NRBC COR # BLD AUTO: 12.31 10*3/MM3 (ref 3.4–10.8)

## 2025-04-28 PROCEDURE — 36415 COLL VENOUS BLD VENIPUNCTURE: CPT

## 2025-04-28 PROCEDURE — 93005 ELECTROCARDIOGRAM TRACING: CPT | Performed by: EMERGENCY MEDICINE

## 2025-04-28 PROCEDURE — 25810000003 SODIUM CHLORIDE 0.9 % SOLUTION: Performed by: EMERGENCY MEDICINE

## 2025-04-28 PROCEDURE — 80050 GENERAL HEALTH PANEL: CPT | Performed by: EMERGENCY MEDICINE

## 2025-04-28 PROCEDURE — G0378 HOSPITAL OBSERVATION PER HR: HCPCS

## 2025-04-28 PROCEDURE — 85007 BL SMEAR W/DIFF WBC COUNT: CPT | Performed by: EMERGENCY MEDICINE

## 2025-04-28 PROCEDURE — 93010 ELECTROCARDIOGRAM REPORT: CPT | Performed by: INTERNAL MEDICINE

## 2025-04-28 PROCEDURE — 83880 ASSAY OF NATRIURETIC PEPTIDE: CPT | Performed by: EMERGENCY MEDICINE

## 2025-04-28 PROCEDURE — 84484 ASSAY OF TROPONIN QUANT: CPT | Performed by: EMERGENCY MEDICINE

## 2025-04-28 PROCEDURE — 85730 THROMBOPLASTIN TIME PARTIAL: CPT | Performed by: EMERGENCY MEDICINE

## 2025-04-28 PROCEDURE — 96376 TX/PRO/DX INJ SAME DRUG ADON: CPT

## 2025-04-28 PROCEDURE — 84100 ASSAY OF PHOSPHORUS: CPT | Performed by: EMERGENCY MEDICINE

## 2025-04-28 PROCEDURE — 83735 ASSAY OF MAGNESIUM: CPT | Performed by: EMERGENCY MEDICINE

## 2025-04-28 PROCEDURE — 85610 PROTHROMBIN TIME: CPT | Performed by: EMERGENCY MEDICINE

## 2025-04-28 PROCEDURE — 99285 EMERGENCY DEPT VISIT HI MDM: CPT

## 2025-04-28 PROCEDURE — 96366 THER/PROPH/DIAG IV INF ADDON: CPT

## 2025-04-28 PROCEDURE — 71045 X-RAY EXAM CHEST 1 VIEW: CPT

## 2025-04-28 PROCEDURE — 96365 THER/PROPH/DIAG IV INF INIT: CPT

## 2025-04-28 RX ORDER — DILTIAZEM HYDROCHLORIDE 5 MG/ML
10 INJECTION INTRAVENOUS ONCE
Status: COMPLETED | OUTPATIENT
Start: 2025-04-28 | End: 2025-04-28

## 2025-04-28 RX ADMIN — SODIUM CHLORIDE 5 MG/HR: 900 INJECTION, SOLUTION INTRAVENOUS at 20:50

## 2025-04-28 RX ADMIN — SODIUM CHLORIDE 1000 ML: 9 INJECTION, SOLUTION INTRAVENOUS at 20:27

## 2025-04-28 RX ADMIN — DILTIAZEM HYDROCHLORIDE 10 MG: 5 INJECTION, SOLUTION INTRAVENOUS at 20:17

## 2025-04-28 NOTE — LETTER
April 30, 2025     Patient: Jaime Lyle Jr.   YOB: 1970   Date of Visit: 4/28/2025       To Whom It May Concern:    It is my medical opinion that Jaime Lyle can return to work on 5//5/25. He was under our care at [facility] starting on 4/28/25.           Sincerely,  Tammie Alvarez RN        No name on file    CC: No Recipients

## 2025-04-29 ENCOUNTER — APPOINTMENT (OUTPATIENT)
Dept: CARDIOLOGY | Facility: HOSPITAL | Age: 55
End: 2025-04-29
Payer: COMMERCIAL

## 2025-04-29 LAB
ALBUMIN SERPL-MCNC: 3.5 G/DL (ref 3.5–5.2)
ALBUMIN/GLOB SERPL: 1.2 G/DL
ALP SERPL-CCNC: 60 U/L (ref 39–117)
ALT SERPL W P-5'-P-CCNC: 21 U/L (ref 1–41)
ANION GAP SERPL CALCULATED.3IONS-SCNC: 9 MMOL/L (ref 5–15)
AORTIC DIMENSIONLESS INDEX: 0.69 (DI)
ASCENDING AORTA: 3.8 CM
AST SERPL-CCNC: 17 U/L (ref 1–40)
AV MEAN PRESS GRAD SYS DOP V1V2: 4.7 MMHG
AV VMAX SYS DOP: 146.6 CM/SEC
BH CV ECHO MEAS - ACS: 2.17 CM
BH CV ECHO MEAS - AO MAX PG: 8.6 MMHG
BH CV ECHO MEAS - AO ROOT DIAM: 3.9 CM
BH CV ECHO MEAS - AO V2 VTI: 27.6 CM
BH CV ECHO MEAS - AVA(I,D): 2.6 CM2
BH CV ECHO MEAS - EDV(CUBED): 367 ML
BH CV ECHO MEAS - EDV(MOD-SP2): 223 ML
BH CV ECHO MEAS - EDV(MOD-SP4): 244 ML
BH CV ECHO MEAS - EF(MOD-SP2): 42.2 %
BH CV ECHO MEAS - EF(MOD-SP4): 48 %
BH CV ECHO MEAS - ESV(CUBED): 257.5 ML
BH CV ECHO MEAS - ESV(MOD-SP2): 129 ML
BH CV ECHO MEAS - ESV(MOD-SP4): 127 ML
BH CV ECHO MEAS - FS: 11.1 %
BH CV ECHO MEAS - IVS/LVPW: 1.05 CM
BH CV ECHO MEAS - IVSD: 1.04 CM
BH CV ECHO MEAS - LAT PEAK E' VEL: 9.8 CM/SEC
BH CV ECHO MEAS - LV DIASTOLIC VOL/BSA (35-75): 93.9 CM2
BH CV ECHO MEAS - LV MASS(C)D: 339.3 GRAMS
BH CV ECHO MEAS - LV MAX PG: 4.8 MMHG
BH CV ECHO MEAS - LV MEAN PG: 2.8 MMHG
BH CV ECHO MEAS - LV SYSTOLIC VOL/BSA (12-30): 48.9 CM2
BH CV ECHO MEAS - LV V1 MAX: 110.1 CM/SEC
BH CV ECHO MEAS - LV V1 VTI: 19.1 CM
BH CV ECHO MEAS - LVIDD: 7.2 CM
BH CV ECHO MEAS - LVIDS: 6.4 CM
BH CV ECHO MEAS - LVOT AREA: 3.7 CM2
BH CV ECHO MEAS - LVOT DIAM: 2.17 CM
BH CV ECHO MEAS - LVPWD: 0.98 CM
BH CV ECHO MEAS - MED PEAK E' VEL: 7 CM/SEC
BH CV ECHO MEAS - MR MAX PG: 80.1 MMHG
BH CV ECHO MEAS - MR MAX VEL: 447.4 CM/SEC
BH CV ECHO MEAS - MV DEC SLOPE: 509.5 CM/SEC2
BH CV ECHO MEAS - MV DEC TIME: 0.12 SEC
BH CV ECHO MEAS - MV E MAX VEL: 97.5 CM/SEC
BH CV ECHO MEAS - MV MAX PG: 9.4 MMHG
BH CV ECHO MEAS - MV MEAN PG: 3 MMHG
BH CV ECHO MEAS - MV P1/2T: 88.2 MSEC
BH CV ECHO MEAS - MV V2 VTI: 35 CM
BH CV ECHO MEAS - MVA(P1/2T): 2.5 CM2
BH CV ECHO MEAS - MVA(VTI): 2.03 CM2
BH CV ECHO MEAS - PA ACC TIME: 0.08 SEC
BH CV ECHO MEAS - PA V2 MAX: 81.4 CM/SEC
BH CV ECHO MEAS - PULM DIAS VEL: 28.5 CM/SEC
BH CV ECHO MEAS - PULM S/D: 0.78
BH CV ECHO MEAS - PULM SYS VEL: 22.1 CM/SEC
BH CV ECHO MEAS - RV MAX PG: 3.4 MMHG
BH CV ECHO MEAS - RV V1 MAX: 91.7 CM/SEC
BH CV ECHO MEAS - RV V1 VTI: 17.3 CM
BH CV ECHO MEAS - SV(LVOT): 70.8 ML
BH CV ECHO MEAS - SV(MOD-SP2): 94 ML
BH CV ECHO MEAS - SV(MOD-SP4): 117 ML
BH CV ECHO MEAS - SVI(LVOT): 27.3 ML/M2
BH CV ECHO MEAS - SVI(MOD-SP2): 36.2 ML/M2
BH CV ECHO MEAS - SVI(MOD-SP4): 45 ML/M2
BH CV ECHO MEASUREMENTS AVERAGE E/E' RATIO: 11.61
BH CV XLRA - RV BASE: 3.8 CM
BH CV XLRA - TDI S': 12.6 CM/SEC
BILIRUB SERPL-MCNC: 0.8 MG/DL (ref 0–1.2)
BUN SERPL-MCNC: 16 MG/DL (ref 6–20)
BUN/CREAT SERPL: 17.4 (ref 7–25)
CALCIUM SPEC-SCNC: 8.8 MG/DL (ref 8.6–10.5)
CHLORIDE SERPL-SCNC: 105 MMOL/L (ref 98–107)
CO2 SERPL-SCNC: 24 MMOL/L (ref 22–29)
CREAT SERPL-MCNC: 0.92 MG/DL (ref 0.76–1.27)
DEPRECATED RDW RBC AUTO: 41.3 FL (ref 37–54)
EGFRCR SERPLBLD CKD-EPI 2021: 97.6 ML/MIN/1.73
ERYTHROCYTE [DISTWIDTH] IN BLOOD BY AUTOMATED COUNT: 13.5 % (ref 12.3–15.4)
GLOBULIN UR ELPH-MCNC: 3 GM/DL
GLUCOSE SERPL-MCNC: 107 MG/DL (ref 65–99)
HCT VFR BLD AUTO: 44.6 % (ref 37.5–51)
HGB BLD-MCNC: 14.7 G/DL (ref 13–17.7)
LEFT ATRIUM VOLUME INDEX: 27.3 ML/M2
LV EF BIPLANE MOD: 45 %
MAGNESIUM SERPL-MCNC: 2.1 MG/DL (ref 1.6–2.6)
MCH RBC QN AUTO: 27.8 PG (ref 26.6–33)
MCHC RBC AUTO-ENTMCNC: 33 G/DL (ref 31.5–35.7)
MCV RBC AUTO: 84.3 FL (ref 79–97)
PHOSPHATE SERPL-MCNC: 2.9 MG/DL (ref 2.5–4.5)
PLATELET # BLD AUTO: 229 10*3/MM3 (ref 140–450)
PMV BLD AUTO: 9.4 FL (ref 6–12)
POTASSIUM SERPL-SCNC: 4.1 MMOL/L (ref 3.5–5.2)
PROT SERPL-MCNC: 6.5 G/DL (ref 6–8.5)
QT INTERVAL: 307 MS
QTC INTERVAL: 499 MS
RBC # BLD AUTO: 5.29 10*6/MM3 (ref 4.14–5.8)
SINUS: 3.8 CM
SODIUM SERPL-SCNC: 138 MMOL/L (ref 136–145)
STJ: 3.3 CM
WBC NRBC COR # BLD AUTO: 7.83 10*3/MM3 (ref 3.4–10.8)

## 2025-04-29 PROCEDURE — 80053 COMPREHEN METABOLIC PANEL: CPT | Performed by: STUDENT IN AN ORGANIZED HEALTH CARE EDUCATION/TRAINING PROGRAM

## 2025-04-29 PROCEDURE — G0378 HOSPITAL OBSERVATION PER HR: HCPCS

## 2025-04-29 PROCEDURE — 96366 THER/PROPH/DIAG IV INF ADDON: CPT

## 2025-04-29 PROCEDURE — 93306 TTE W/DOPPLER COMPLETE: CPT | Performed by: INTERNAL MEDICINE

## 2025-04-29 PROCEDURE — 85027 COMPLETE CBC AUTOMATED: CPT | Performed by: STUDENT IN AN ORGANIZED HEALTH CARE EDUCATION/TRAINING PROGRAM

## 2025-04-29 PROCEDURE — 25510000001 PERFLUTREN 6.52 MG/ML SUSPENSION 2 ML VIAL: Performed by: STUDENT IN AN ORGANIZED HEALTH CARE EDUCATION/TRAINING PROGRAM

## 2025-04-29 PROCEDURE — 83735 ASSAY OF MAGNESIUM: CPT | Performed by: STUDENT IN AN ORGANIZED HEALTH CARE EDUCATION/TRAINING PROGRAM

## 2025-04-29 PROCEDURE — 93306 TTE W/DOPPLER COMPLETE: CPT

## 2025-04-29 PROCEDURE — 84100 ASSAY OF PHOSPHORUS: CPT | Performed by: STUDENT IN AN ORGANIZED HEALTH CARE EDUCATION/TRAINING PROGRAM

## 2025-04-29 RX ORDER — ENOXAPARIN SODIUM 100 MG/ML
40 INJECTION SUBCUTANEOUS 2 TIMES DAILY
Status: DISCONTINUED | OUTPATIENT
Start: 2025-04-29 | End: 2025-04-29

## 2025-04-29 RX ORDER — POLYETHYLENE GLYCOL 3350 17 G/17G
17 POWDER, FOR SOLUTION ORAL DAILY PRN
Status: DISCONTINUED | OUTPATIENT
Start: 2025-04-29 | End: 2025-04-30 | Stop reason: HOSPADM

## 2025-04-29 RX ORDER — SODIUM CHLORIDE 9 MG/ML
40 INJECTION, SOLUTION INTRAVENOUS AS NEEDED
Status: DISCONTINUED | OUTPATIENT
Start: 2025-04-29 | End: 2025-04-30 | Stop reason: HOSPADM

## 2025-04-29 RX ORDER — FAMOTIDINE 20 MG/1
20 TABLET, FILM COATED ORAL 2 TIMES DAILY PRN
Status: DISCONTINUED | OUTPATIENT
Start: 2025-04-29 | End: 2025-04-30 | Stop reason: HOSPADM

## 2025-04-29 RX ORDER — SODIUM CHLORIDE 0.9 % (FLUSH) 0.9 %
10 SYRINGE (ML) INJECTION AS NEEDED
Status: DISCONTINUED | OUTPATIENT
Start: 2025-04-29 | End: 2025-04-30 | Stop reason: HOSPADM

## 2025-04-29 RX ORDER — NITROGLYCERIN 0.4 MG/1
0.4 TABLET SUBLINGUAL
Status: DISCONTINUED | OUTPATIENT
Start: 2025-04-29 | End: 2025-04-30 | Stop reason: HOSPADM

## 2025-04-29 RX ORDER — ACETAMINOPHEN 650 MG/1
650 SUPPOSITORY RECTAL EVERY 4 HOURS PRN
Status: DISCONTINUED | OUTPATIENT
Start: 2025-04-29 | End: 2025-04-30 | Stop reason: HOSPADM

## 2025-04-29 RX ORDER — AMOXICILLIN 250 MG
2 CAPSULE ORAL 2 TIMES DAILY PRN
Status: DISCONTINUED | OUTPATIENT
Start: 2025-04-29 | End: 2025-04-30 | Stop reason: HOSPADM

## 2025-04-29 RX ORDER — ONDANSETRON 2 MG/ML
4 INJECTION INTRAMUSCULAR; INTRAVENOUS EVERY 6 HOURS PRN
Status: DISCONTINUED | OUTPATIENT
Start: 2025-04-29 | End: 2025-04-30 | Stop reason: HOSPADM

## 2025-04-29 RX ORDER — SODIUM CHLORIDE 9 MG/ML
100 INJECTION, SOLUTION INTRAVENOUS CONTINUOUS
Status: DISCONTINUED | OUTPATIENT
Start: 2025-04-29 | End: 2025-04-29

## 2025-04-29 RX ORDER — METOPROLOL TARTRATE 25 MG/1
12.5 TABLET, FILM COATED ORAL EVERY 12 HOURS SCHEDULED
Status: DISCONTINUED | OUTPATIENT
Start: 2025-04-29 | End: 2025-04-29

## 2025-04-29 RX ORDER — ACETAMINOPHEN 325 MG/1
650 TABLET ORAL EVERY 4 HOURS PRN
Status: DISCONTINUED | OUTPATIENT
Start: 2025-04-29 | End: 2025-04-30 | Stop reason: HOSPADM

## 2025-04-29 RX ORDER — ACETAMINOPHEN 160 MG/5ML
650 SOLUTION ORAL EVERY 4 HOURS PRN
Status: DISCONTINUED | OUTPATIENT
Start: 2025-04-29 | End: 2025-04-30 | Stop reason: HOSPADM

## 2025-04-29 RX ORDER — FUROSEMIDE 40 MG/1
20 TABLET ORAL DAILY
Status: DISCONTINUED | OUTPATIENT
Start: 2025-04-29 | End: 2025-04-29

## 2025-04-29 RX ORDER — SODIUM CHLORIDE 0.9 % (FLUSH) 0.9 %
10 SYRINGE (ML) INJECTION EVERY 12 HOURS SCHEDULED
Status: DISCONTINUED | OUTPATIENT
Start: 2025-04-29 | End: 2025-04-30 | Stop reason: HOSPADM

## 2025-04-29 RX ORDER — PANTOPRAZOLE SODIUM 40 MG/1
40 TABLET, DELAYED RELEASE ORAL 2 TIMES DAILY
Status: DISCONTINUED | OUTPATIENT
Start: 2025-04-29 | End: 2025-04-30 | Stop reason: HOSPADM

## 2025-04-29 RX ORDER — BISACODYL 10 MG
10 SUPPOSITORY, RECTAL RECTAL DAILY PRN
Status: DISCONTINUED | OUTPATIENT
Start: 2025-04-29 | End: 2025-04-30 | Stop reason: HOSPADM

## 2025-04-29 RX ORDER — METOPROLOL SUCCINATE 25 MG/1
25 TABLET, EXTENDED RELEASE ORAL
Status: DISCONTINUED | OUTPATIENT
Start: 2025-04-29 | End: 2025-04-30

## 2025-04-29 RX ORDER — BISACODYL 5 MG/1
5 TABLET, DELAYED RELEASE ORAL DAILY PRN
Status: DISCONTINUED | OUTPATIENT
Start: 2025-04-29 | End: 2025-04-30 | Stop reason: HOSPADM

## 2025-04-29 RX ADMIN — METOPROLOL SUCCINATE 25 MG: 25 TABLET, EXTENDED RELEASE ORAL at 20:00

## 2025-04-29 RX ADMIN — Medication 10 ML: at 09:50

## 2025-04-29 RX ADMIN — SODIUM CHLORIDE 5 MG/HR: 900 INJECTION, SOLUTION INTRAVENOUS at 12:13

## 2025-04-29 RX ADMIN — PANTOPRAZOLE SODIUM 40 MG: 40 TABLET, DELAYED RELEASE ORAL at 08:01

## 2025-04-29 RX ADMIN — PANTOPRAZOLE SODIUM 40 MG: 40 TABLET, DELAYED RELEASE ORAL at 20:00

## 2025-04-29 RX ADMIN — METOPROLOL TARTRATE 12.5 MG: 25 TABLET, FILM COATED ORAL at 13:10

## 2025-04-29 RX ADMIN — Medication 10 ML: at 02:03

## 2025-04-29 RX ADMIN — SODIUM CHLORIDE 100 ML/HR: 9 INJECTION, SOLUTION INTRAVENOUS at 03:20

## 2025-04-29 RX ADMIN — FUROSEMIDE 20 MG: 40 TABLET ORAL at 08:01

## 2025-04-29 RX ADMIN — Medication 10 ML: at 20:03

## 2025-04-29 RX ADMIN — PERFLUTREN 2 ML: 6.52 INJECTION, SUSPENSION INTRAVENOUS at 11:07

## 2025-04-29 RX ADMIN — RIVAROXABAN 20 MG: 20 TABLET, FILM COATED ORAL at 20:00

## 2025-04-29 NOTE — ED NOTES
Nursing report ED to floor  Jaime Lyle Jr.  54 y.o.  male    HPI :  HPI  Stated Reason for Visit: soa, afib rvr    Chief Complaint  Chief Complaint   Patient presents with    Shortness of Breath    Rapid Heart Rate       Admitting doctor:   Lisette Vasquez MD    Admitting diagnosis:   The primary encounter diagnosis was Atrial fibrillation with RVR. Diagnoses of Elevated troponin and Elevated brain natriuretic peptide (BNP) level were also pertinent to this visit.    Code status:   Current Code Status       Date Active Code Status Order ID Comments User Context       4/29/2025 0157 CPR (Attempt to Resuscitate) [id number]  Tabby Noyola, APRN ED        Question Answer    Code Status (Patient has no pulse and is not breathing) CPR (Attempt to Resuscitate)    Medical Interventions (Patient has pulse or is breathing) Full Support                    Allergies:   Patient has no known allergies.    Isolation:   No active isolations    Intake and Output  No intake or output data in the 24 hours ending 04/29/25 0426    Weight:       04/28/25 2012   Weight: (!) 143 kg (315 lb)       Most recent vitals:   Vitals:    04/29/25 0016 04/29/25 0030 04/29/25 0047 04/29/25 0400   BP: 100/63   99/81   Pulse: 116 106 116 94   Resp:    18   Temp:       TempSrc:       SpO2: 95% 94% 95% 96%   Weight:       Height:           Active LDAs/IV Access:   Lines, Drains & Airways       Active LDAs       Name Placement date Placement time Site Days    Peripheral IV 04/28/25 2010 20 G Anterior;Distal;Left;Upper Arm 04/28/25 2010  Arm  less than 1                    Labs (abnormal labs have a star):   Labs Reviewed   COMPREHENSIVE METABOLIC PANEL - Abnormal; Notable for the following components:       Result Value    Glucose 120 (*)     Potassium 3.4 (*)     All other components within normal limits    Narrative:     GFR Categories in Chronic Kidney Disease (CKD)      GFR Category          GFR (mL/min/1.73)    Interpretation  G1                      90 or greater         Normal or high (1)  G2                      60-89                Mild decrease (1)  G3a                   45-59                Mild to moderate decrease  G3b                   30-44                Moderate to severe decrease  G4                    15-29                Severe decrease  G5                    14 or less           Kidney failure          (1)In the absence of evidence of kidney disease, neither GFR category G1 or G2 fulfill the criteria for CKD.    eGFR calculation 2021 CKD-EPI creatinine equation, which does not include race as a factor   PROTIME-INR - Abnormal; Notable for the following components:    Protime 15.8 (*)     INR 1.26 (*)     All other components within normal limits   BNP (IN-HOUSE) - Abnormal; Notable for the following components:    proBNP 2,059.0 (*)     All other components within normal limits    Narrative:     This assay is used as an aid in the diagnosis of individuals suspected of having heart failure. It can be used as an aid in the diagnosis of acute decompensated heart failure (ADHF) in patients presenting with signs and symptoms of ADHF to the emergency department (ED). In addition, NT-proBNP of <300 pg/mL indicates ADHF is not likely.    Age Range Result Interpretation  NT-proBNP Concentration (pg/mL:      <50             Positive            >450                   Gray                 300-450                    Negative             <300    50-75           Positive            >900                  Gray                300-900                  Negative            <300      >75             Positive            >1800                  Gray                300-1800                  Negative            <300   TROPONIN - Abnormal; Notable for the following components:    HS Troponin T 41 (*)     All other components within normal limits    Narrative:     High Sensitive Troponin T Reference Range:  <14.0 ng/L- Negative Female for AMI  <22.0 ng/L- Negative  Male for AMI  >=14 - Abnormal Female indicating possible myocardial injury.  >=22 - Abnormal Male indicating possible myocardial injury.   Clinicians would have to utilize clinical acumen, EKG, Troponin, and serial changes to determine if it is an Acute Myocardial Infarction or myocardial injury due to an underlying chronic condition.        CBC WITH AUTO DIFFERENTIAL - Abnormal; Notable for the following components:    WBC 12.31 (*)     Immature Grans % 1.0 (*)     Neutrophils, Absolute 7.85 (*)     Lymphocytes, Absolute 3.24 (*)     Immature Grans, Absolute 0.12 (*)     All other components within normal limits   HIGH SENSITIVITIY TROPONIN T 1HR - Abnormal; Notable for the following components:    HS Troponin T 37 (*)     All other components within normal limits    Narrative:     High Sensitive Troponin T Reference Range:  <14.0 ng/L- Negative Female for AMI  <22.0 ng/L- Negative Male for AMI  >=14 - Abnormal Female indicating possible myocardial injury.  >=22 - Abnormal Male indicating possible myocardial injury.   Clinicians would have to utilize clinical acumen, EKG, Troponin, and serial changes to determine if it is an Acute Myocardial Infarction or myocardial injury due to an underlying chronic condition.        APTT - Normal   MAGNESIUM - Normal   PHOSPHORUS - Normal   TSH RFX ON ABNORMAL TO FREE T4 - Normal   SCAN SLIDE   CBC (NO DIFF)   COMPREHENSIVE METABOLIC PANEL   CBC AND DIFFERENTIAL    Narrative:     The following orders were created for panel order CBC & Differential.  Procedure                               Abnormality         Status                     ---------                               -----------         ------                     CBC Auto Differential[[id number]]        Abnormal            Final result               Scan Slide[[id number]]                                       Final result                 Please view results for these tests on the individual orders.       EKG:   ECG 12 Lead  Chest Pain   Preliminary Result   HEART FKXJ=146  bpm   RR Whcvgarh=730  ms   NM Interval=  ms   P Horizontal Axis=  deg   P Front Axis=  deg   QRSD Porogovd=571  ms   QT Ujnnjujf=478  ms   QZcK=292  ms   QRS Axis=35  deg   T Wave Axis=9  deg   - ABNORMAL ECG -   Atrial fibrillation   Paired ventricular premature complexes   Borderline T abnormalities, inferior leads   Borderline prolonged QT interval   Date and Time of Study:2025-04-28 20:09:32          Meds given in ED:   Medications   dilTIAZem (CARDIZEM) 100 mg in 100 mL NS infusion (ADV) (2.5 mg/hr Intravenous Restarted 4/28/25 2152)   nitroglycerin (NITROSTAT) SL tablet 0.4 mg (has no administration in time range)   sodium chloride 0.9 % flush 10 mL (10 mL Intravenous Given 4/29/25 0203)   sodium chloride 0.9 % flush 10 mL (has no administration in time range)   sodium chloride 0.9 % infusion 40 mL (has no administration in time range)   acetaminophen (TYLENOL) tablet 650 mg (has no administration in time range)     Or   acetaminophen (TYLENOL) 160 MG/5ML oral solution 650 mg (has no administration in time range)     Or   acetaminophen (TYLENOL) suppository 650 mg (has no administration in time range)   famotidine (PEPCID) tablet 20 mg (has no administration in time range)   sennosides-docusate (PERICOLACE) 8.6-50 MG per tablet 2 tablet (has no administration in time range)     And   polyethylene glycol (MIRALAX) packet 17 g (has no administration in time range)     And   bisacodyl (DULCOLAX) EC tablet 5 mg (has no administration in time range)     And   bisacodyl (DULCOLAX) suppository 10 mg (has no administration in time range)   ondansetron (ZOFRAN) injection 4 mg (has no administration in time range)   melatonin tablet 5 mg (has no administration in time range)   sodium chloride 0.9 % infusion (100 mL/hr Intravenous New Bag 4/29/25 0320)   dilTIAZem (CARDIZEM) injection 10 mg (10 mg Intravenous Given 4/28/25 2017)   sodium chloride 0.9 % bolus 1,000 mL (0  mL Intravenous Stopped 4/29/25 0053)       Imaging results:  XR Chest 1 View  Result Date: 4/28/2025  No focal pulmonary consolidation. Borderline heart size. Follow-up as clinical indications persist.  This report was finalized on 4/28/2025 8:56 PM by Dr. Valdo Wells M.D on Workstation: [employer]        Ambulatory status:   - standby    Social issues:   Social History     Socioeconomic History    Marital status:    Tobacco Use    Smoking status: Light Smoker     Types: Cigars     Passive exposure: Current    Smokeless tobacco: Never    Tobacco comments:     1 cigar monthly   Vaping Use    Vaping status: Never Used   Substance and Sexual Activity    Alcohol use: Yes     Comment: caffeine use    Drug use: Never    Sexual activity: Defer       Peripheral Neurovascular  Peripheral Neurovascular (Adult)  Peripheral Neurovascular WDL: WDL    Neuro Cognitive  Neuro Cognitive (Adult)  Cognitive/Neuro/Behavioral WDL: WDL    Learning  Learning Assessment  Learning Readiness and Ability: no barriers identified    Respiratory  Respiratory WDL  Respiratory WDL: WDL    Abdominal Pain       Pain Assessments  Pain (Adult)  (0-10) Pain Rating: Rest: 0    NIH Stroke Scale       Maral Amaya RN  04/29/25 04:26 EDT

## 2025-04-29 NOTE — PLAN OF CARE
Goal Outcome Evaluation:           Progress: improving  Outcome Evaluation: VSS, AOx4. Cardizem gtt curently running per MAR. No pain complaints during shift. No new issues at this time. Will continue with POC.

## 2025-04-29 NOTE — NURSING NOTE
Nursing report ED to floor  Jaime Lyle  54 y.o.  male    HPI :  HPI  Stated Reason for Visit: soa, afib rvr    Chief Complaint  Chief Complaint   Patient presents with    Shortness of Breath    Rapid Heart Rate       Admitting doctor:   Lisette Vasquez MD    Admitting diagnosis:   The primary encounter diagnosis was Atrial fibrillation with RVR. Diagnoses of Elevated troponin and Elevated brain natriuretic peptide (BNP) level were also pertinent to this visit.    Code status:   Current Code Status       Date Active Code Status Order ID Comments User Context       Not on file            Allergies:   Patient has no known allergies.    Isolation:   No active isolations    Intake and Output  No intake or output data in the 24 hours ending 04/29/25 0049    Weight:       04/28/25 2012   Weight: (!) 143 kg (315 lb)       Most recent vitals:   Vitals:    04/29/25 0001 04/29/25 0016 04/29/25 0030 04/29/25 0047   BP: 130/90 100/63     Pulse: 94 116 106 116   Resp:       Temp:       TempSrc:       SpO2: 94% 95% 94% 95%   Weight:       Height:           Active LDAs/IV Access:   Lines, Drains & Airways       Active LDAs       Name Placement date Placement time Site Days    Peripheral IV 04/28/25 2010 20 G Anterior;Distal;Left;Upper Arm 04/28/25 2010  Arm  less than 1                    Labs (abnormal labs have a star):   Labs Reviewed   COMPREHENSIVE METABOLIC PANEL - Abnormal; Notable for the following components:       Result Value    Glucose 120 (*)     Potassium 3.4 (*)     All other components within normal limits    Narrative:     GFR Categories in Chronic Kidney Disease (CKD)      GFR Category          GFR (mL/min/1.73)    Interpretation  G1                     90 or greater         Normal or high (1)  G2                      60-89                Mild decrease (1)  G3a                   45-59                Mild to moderate decrease  G3b                   30-44                Moderate to severe decrease  G4                     15-29                Severe decrease  G5                    14 or less           Kidney failure          (1)In the absence of evidence of kidney disease, neither GFR category G1 or G2 fulfill the criteria for CKD.    eGFR calculation 2021 CKD-EPI creatinine equation, which does not include race as a factor   PROTIME-INR - Abnormal; Notable for the following components:    Protime 15.8 (*)     INR 1.26 (*)     All other components within normal limits   BNP (IN-HOUSE) - Abnormal; Notable for the following components:    proBNP 2,059.0 (*)     All other components within normal limits    Narrative:     This assay is used as an aid in the diagnosis of individuals suspected of having heart failure. It can be used as an aid in the diagnosis of acute decompensated heart failure (ADHF) in patients presenting with signs and symptoms of ADHF to the emergency department (ED). In addition, NT-proBNP of <300 pg/mL indicates ADHF is not likely.    Age Range Result Interpretation  NT-proBNP Concentration (pg/mL:      <50             Positive            >450                   Gray                 300-450                    Negative             <300    50-75           Positive            >900                  Gray                300-900                  Negative            <300      >75             Positive            >1800                  Gray                300-1800                  Negative            <300   TROPONIN - Abnormal; Notable for the following components:    HS Troponin T 41 (*)     All other components within normal limits    Narrative:     High Sensitive Troponin T Reference Range:  <14.0 ng/L- Negative Female for AMI  <22.0 ng/L- Negative Male for AMI  >=14 - Abnormal Female indicating possible myocardial injury.  >=22 - Abnormal Male indicating possible myocardial injury.   Clinicians would have to utilize clinical acumen, EKG, Troponin, and serial changes to determine if it is an Acute Myocardial  Infarction or myocardial injury due to an underlying chronic condition.        CBC WITH AUTO DIFFERENTIAL - Abnormal; Notable for the following components:    WBC 12.31 (*)     Immature Grans % 1.0 (*)     Neutrophils, Absolute 7.85 (*)     Lymphocytes, Absolute 3.24 (*)     Immature Grans, Absolute 0.12 (*)     All other components within normal limits   HIGH SENSITIVITIY TROPONIN T 1HR - Abnormal; Notable for the following components:    HS Troponin T 37 (*)     All other components within normal limits    Narrative:     High Sensitive Troponin T Reference Range:  <14.0 ng/L- Negative Female for AMI  <22.0 ng/L- Negative Male for AMI  >=14 - Abnormal Female indicating possible myocardial injury.  >=22 - Abnormal Male indicating possible myocardial injury.   Clinicians would have to utilize clinical acumen, EKG, Troponin, and serial changes to determine if it is an Acute Myocardial Infarction or myocardial injury due to an underlying chronic condition.        APTT - Normal   MAGNESIUM - Normal   PHOSPHORUS - Normal   TSH RFX ON ABNORMAL TO FREE T4 - Normal   SCAN SLIDE   CBC AND DIFFERENTIAL    Narrative:     The following orders were created for panel order CBC & Differential.  Procedure                               Abnormality         Status                     ---------                               -----------         ------                     CBC Auto Differential[[id number]]        Abnormal            Final result               Scan Slide[[id number]]                                       Final result                 Please view results for these tests on the individual orders.       EKG:   ECG 12 Lead Chest Pain   Preliminary Result   HEART FSEH=914  bpm   RR Vigvcgak=288  ms   AK Interval=  ms   P Horizontal Axis=  deg   P Front Axis=  deg   QRSD Kwzidbxo=549  ms   QT Wklbznhf=730  ms   LOqX=175  ms   QRS Axis=35  deg   T Wave Axis=9  deg   - ABNORMAL ECG -   Atrial fibrillation   Paired ventricular  premature complexes   Borderline T abnormalities, inferior leads   Borderline prolonged QT interval   Date and Time of Study:2025-04-28 20:09:32          Meds given in ED:   Medications   dilTIAZem (CARDIZEM) 100 mg in 100 mL NS infusion (ADV) (2.5 mg/hr Intravenous Restarted 4/28/25 2152)   dilTIAZem (CARDIZEM) injection 10 mg (10 mg Intravenous Given 4/28/25 2017)   sodium chloride 0.9 % bolus 1,000 mL (1,000 mL Intravenous New Bag 4/28/25 2027)       Imaging results:  XR Chest 1 View  Result Date: 4/28/2025  No focal pulmonary consolidation. Borderline heart size. Follow-up as clinical indications persist.  This report was finalized on 4/28/2025 8:56 PM by Dr. Valdo Wells M.D on Workstation: [id number]        Ambulatory status:   - br    Social issues:   Social History     Socioeconomic History    Marital status:    Tobacco Use    Smoking status: Light Smoker     Types: Cigars     Passive exposure: Current    Smokeless tobacco: Never    Tobacco comments:     1 cigar monthly   Vaping Use    Vaping status: Never Used   Substance and Sexual Activity    Alcohol use: Yes     Comment: caffeine use    Drug use: Never    Sexual activity: Defer       Peripheral Neurovascular  Peripheral Neurovascular (Adult)  Peripheral Neurovascular WDL: WDL    Neuro Cognitive  Neuro Cognitive (Adult)  Cognitive/Neuro/Behavioral WDL: WDL    Learning  Learning Assessment  Learning Readiness and Ability: no barriers identified    Respiratory  Respiratory WDL  Respiratory WDL: WDL    Abdominal Pain       Pain Assessments  Pain (Adult)  (0-10) Pain Rating: Rest: 0    NIH Stroke Scale       Joey Colon RN  04/29/25 00:49 EDT

## 2025-04-29 NOTE — CASE MANAGEMENT/SOCIAL WORK
Discharge Planning Assessment  [facility]     Patient Name: Jaime Lyle Jr.  MRN: [id number]  Today's Date: 4/29/2025    Admit Date: 4/28/2025    Plan: Home; Denies needs.   Discharge Needs Assessment       Row Name 04/29/25 1535       Living Environment    People in Home spouse    Name(s) of People in Home Mady Valdo/spouse    Current Living Arrangements home    Potentially Unsafe Housing Conditions none    In the past 12 months has the electric, gas, oil, or water company threatened to shut off services in your home? No    Primary Care Provided by self    Provides Primary Care For pet(s)  2 dogs    Family Caregiver if Needed spouse    Family Caregiver Names Mady    Quality of Family Relationships helpful;involved;supportive    Able to Return to Prior Arrangements yes       Resource/Environmental Concerns    Resource/Environmental Concerns none    Transportation Concerns none       Transportation Needs    In the past 12 months, has lack of transportation kept you from medical appointments or from getting medications? no    In the past 12 months, has lack of transportation kept you from meetings, work, or from getting things needed for daily living? No       Food Insecurity    Within the past 12 months, you worried that your food would run out before you got the money to buy more. Never true    Within the past 12 months, the food you bought just didn't last and you didn't have money to get more. Never true       Transition Planning    Patient/Family Anticipates Transition to home with family    Patient/Family Anticipated Services at Transition none    Transportation Anticipated family or friend will provide       Discharge Needs Assessment    Readmission Within the Last 30 Days no previous admission in last 30 days    Equipment Currently Used at Home cpap;pulse ox;bp cuff;scales    Concerns to be Addressed no discharge needs identified;denies needs/concerns at this time    Do you want help finding or  keeping work or a job? I do not need or want help    Do you want help with school or training? For example, starting or completing job training or getting a high school diploma, GED or equivalent No    Anticipated Changes Related to Illness none    Equipment Needed After Discharge none    Provided Post Acute Provider List? N/A    Provided Post Acute Provider Quality & Resource List? N/A                   Discharge Plan       Row Name 04/29/25 1536       Plan    Plan Home; Denies needs.    Plan Comments CCP spoke with pleasant patient and his spouse/Mady Lyle at bedside.  Explained role, verified face sheet, and discussed discharge plan.  Patient stated he is IADL's, works full-time and drives.  Patient lives in a single-story home with three entrance stair steps.  Patients PCP confirmed as [facility] and home pharmacy is [address]  Patient has the following DME- CPAP ([employer]), pulse oximeter, BP cuff and scale.  Patient denies use of past home health or going to a sub-acute rehab.  Patient plans to return home at discharge.  Patient denies current discharge needs.  CCP will continue to follow….…Morelia JEONG /WENDY.                    Expected Discharge Date and Time       Expected Discharge Date Expected Discharge Time    Apr 30, 2025            Demographic Summary       Row Name 04/29/25 1535       General Information    Admission Type observation    Arrived From emergency department    Referral Source admission list    Reason for Consult discharge planning    Preferred Language English       Contact Information    Permission Granted to Share Info With                    Functional Status       Row Name 04/29/25 1535       Functional Status    Usual Activity Tolerance good    Current Activity Tolerance good       Physical Activity    On average, how many days per week do you engage in moderate to strenuous exercise (like a brisk walk)? 5 days    On average, how many minutes do you  engage in exercise at this level? 60 min    Number of minutes of exercise per week 300       Assessment of Health Literacy    How often do you have someone help you read hospital materials? Never    How often do you have problems learning about your medical condition because of difficulty understanding written information? Never    How often do you have a problem understanding what is told to you about your medical condition? Never    How confident are you filling out medical forms by yourself? Extremely    Health Literacy Good       Functional Status, IADL    Medications independent    Meal Preparation independent    Housekeeping independent    Laundry independent    Shopping independent    If for any reason you need help with day-to-day activities such as bathing, preparing meals, shopping, managing finances, etc., do you get the help you need? I don't need any help       Mental Status    General Appearance WDL WDL       Mental Status Summary    Recent Changes in Mental Status/Cognitive Functioning no changes       Employment/    Employment Status employed full-time    Current or Previous Occupation healthcare    Employment/ Comments Paramedic                   Psychosocial    No documentation.                  Abuse/Neglect    No documentation.                  Legal    No documentation.                  Substance Abuse    No documentation.                  Patient Forms    No documentation.                     Morelia Hardin RN

## 2025-04-29 NOTE — PLAN OF CARE
Goal Outcome Evaluation:  Plan of Care Reviewed With: patient        Progress: no change  Outcome Evaluation: New admit from ED, A&O, no complaints of pain, cardizem infusing, VSS, continue plan of care.

## 2025-04-29 NOTE — ED PROVIDER NOTES
EMERGENCY DEPARTMENT ENCOUNTER  Room Number:  37/37  PCP: Zehra Shen APRN  Independent Historians: Patient and EMS  Date of encounter:  4/28/2025  Patient Care Team:  Zehra Shen APRN as PCP - General (Nurse Practitioner)  Chandra Nicholson MD as Consulting Physician (Cardiology)     HPI:  Chief Complaint: had concerns including Shortness of Breath and Rapid Heart Rate.     A complete HPI/ROS/PMH/PSH/SH/FH are unobtainable due to: None    Chronic or social conditions impacting patient care (Social Determinants of Health): None    Context: Jaime Lyle is a 54 y.o. male with a medical history of PVCs, hypertension, GERD who presents to the ED c/o acute shortness of breath.  Patient has had intermittent shortness of breath and lightheadedness over the past 3 to 4 days.  Symptoms would usually last only 2 to 3 minutes at a time before resolving.  Today however, he had a similar episode that lasted for at least 2 to 3 hours.  He was evaluated by the medics at [employer] where he works and was found to be in A-fib with RVR.  No prior history of A-fib.  He was recently diagnosed with hypertension and started on Lasix, lisinopril and HCTZ 3 weeks ago.  He has been compliant with these medications.  He also takes a potassium supplement.  He denies any current chest pain.  He smokes approximately 2 cigars/month.  No other tobacco use.  Patient took 324 mg of aspirin en route with EMS.    Review of prior external notes (non-ED) -and- Review of prior external test results outside of this encounter:  Stress test from 1/30/2020 reviewed.  Low restudy without evidence of acute ischemia    PAST MEDICAL HISTORY  Active Ambulatory Problems     Diagnosis Date Noted    PVC's (premature ventricular contractions) 01/09/2018    Obesity (BMI 35.0-39.9 without comorbidity)     Cigar smoker 08/20/2018    Palpitations 08/20/2018    Tobacco use 06/08/2021    Essential hypertension 06/08/2021    Localized edema  06/08/2021    Preventative health care 06/08/2021    Bradycardia 06/08/2021    Screening for colon cancer 06/08/2021    Screening for malignant neoplasm of prostate 06/08/2021    Hyperglycemia 06/08/2021    Frequent PVCs 06/08/2021    Gastroesophageal reflux disease 09/06/2022    Bronchitis 12/29/2022    Need for vaccination against Streptococcus pneumoniae 01/10/2024    Personal history of gastric banding 01/10/2024    Class 3 severe obesity with serious comorbidity and body mass index (BMI) of 40.0 to 44.9 in adult 01/10/2024    Screening for malignant neoplasm of colon 01/10/2024    Annual physical exam 01/11/2024    Chronic pain of left knee 01/11/2024    Vitamin D deficiency 09/11/2024    Chronic fatigue 09/11/2024    Snoring 09/11/2024    STEVE (obstructive sleep apnea) 01/24/2025     Resolved Ambulatory Problems     Diagnosis Date Noted    No Resolved Ambulatory Problems     Past Medical History:   Diagnosis Date    Acid reflux     Acute medial meniscus tear     Community acquired pneumonia     Mild mitral regurgitation     Pulmonic regurgitation     Tricuspid regurgitation        PAST SURGICAL HISTORY  Past Surgical History:   Procedure Laterality Date    LAPAROSCOPIC GASTRIC BANDING      ROTATOR CUFF REPAIR         FAMILY HISTORY  Family History   Problem Relation Age of Onset    Heart disease Mother 50    Diabetes Father     Sudden death Maternal Uncle 52    Stroke Maternal Grandmother 50    Diabetes Paternal Grandmother     Diabetes Paternal Grandfather     Hypertension Other     Heart disease Other         ischemic    Diabetes Other        SOCIAL HISTORY  Social History     Socioeconomic History    Marital status:    Tobacco Use    Smoking status: Light Smoker     Types: Cigars     Passive exposure: Current    Smokeless tobacco: Never    Tobacco comments:     1 cigar monthly   Vaping Use    Vaping status: Never Used   Substance and Sexual Activity    Alcohol use: Yes     Comment: caffeine use     Drug use: Never    Sexual activity: Defer       ALLERGIES  Patient has no known allergies.    REVIEW OF SYSTEMS  Review of Systems  Included in HPI  All systems reviewed and negative except for those discussed in HPI.    PHYSICAL EXAM    I have reviewed the triage vital signs and nursing notes.    ED Triage Vitals [04/28/25 2011]   Temp Heart Rate Resp BP SpO2   -- (!) 163 -- -- 95 %      Temp src Heart Rate Source Patient Position BP Location FiO2 (%)   -- -- -- -- --       Physical Exam  Vitals and nursing note reviewed.   Constitutional:       General: He is not in acute distress.     Appearance: Normal appearance. He is not ill-appearing, toxic-appearing or diaphoretic.   HENT:      Head: Normocephalic and atraumatic.      Nose: Nose normal.      Mouth/Throat:      Mouth: Mucous membranes are moist.   Eyes:      Extraocular Movements: Extraocular movements intact.      Conjunctiva/sclera: Conjunctivae normal.      Pupils: Pupils are equal, round, and reactive to light.   Cardiovascular:      Rate and Rhythm: Tachycardia present. Rhythm irregular.      Pulses: Normal pulses.      Heart sounds: Normal heart sounds. No murmur heard.     No friction rub. No gallop.   Pulmonary:      Effort: Pulmonary effort is normal. No respiratory distress.      Breath sounds: Normal breath sounds. No stridor. No wheezing, rhonchi or rales.   Abdominal:      General: Abdomen is flat. There is no distension.      Palpations: Abdomen is soft.      Tenderness: There is no abdominal tenderness. There is no guarding or rebound.   Musculoskeletal:      Cervical back: Normal range of motion and neck supple.   Skin:     General: Skin is warm and dry.      Capillary Refill: Capillary refill takes less than 2 seconds.   Neurological:      General: No focal deficit present.      Mental Status: He is alert and oriented to person, place, and time. Mental status is at baseline.   Psychiatric:         Mood and Affect: Mood normal.          Behavior: Behavior normal.         Thought Content: Thought content normal.         Judgment: Judgment normal.         LAB RESULTS  Recent Results (from the past 24 hours)   ECG 12 Lead Chest Pain    Collection Time: 04/28/25  8:09 PM   Result Value Ref Range    QT Interval 307 ms    QTC Interval 499 ms   Comprehensive Metabolic Panel    Collection Time: 04/28/25  8:14 PM    Specimen: Blood   Result Value Ref Range    Glucose 120 (H) 65 - 99 mg/dL    BUN 18 6 - 20 mg/dL    Creatinine 1.16 0.76 - 1.27 mg/dL    Sodium 137 136 - 145 mmol/L    Potassium 3.4 (L) 3.5 - 5.2 mmol/L    Chloride 103 98 - 107 mmol/L    CO2 22.0 22.0 - 29.0 mmol/L    Calcium 9.2 8.6 - 10.5 mg/dL    Total Protein 7.2 6.0 - 8.5 g/dL    Albumin 4.0 3.5 - 5.2 g/dL    ALT (SGPT) 24 1 - 41 U/L    AST (SGOT) 17 1 - 40 U/L    Alkaline Phosphatase 71 39 - 117 U/L    Total Bilirubin 0.6 0.0 - 1.2 mg/dL    Globulin 3.2 gm/dL    A/G Ratio 1.3 g/dL    BUN/Creatinine Ratio 15.5 7.0 - 25.0    Anion Gap 12.0 5.0 - 15.0 mmol/L    eGFR 73.9 >60.0 mL/min/1.73   BNP    Collection Time: 04/28/25  8:14 PM    Specimen: Blood   Result Value Ref Range    proBNP 2,059.0 (H) 0.0 - 900.0 pg/mL   High Sensitivity Troponin T    Collection Time: 04/28/25  8:14 PM    Specimen: Blood   Result Value Ref Range    HS Troponin T 41 (H) <22 ng/L   Magnesium    Collection Time: 04/28/25  8:14 PM    Specimen: Blood   Result Value Ref Range    Magnesium 2.0 1.6 - 2.6 mg/dL   Phosphorus    Collection Time: 04/28/25  8:14 PM    Specimen: Blood   Result Value Ref Range    Phosphorus 2.8 2.5 - 4.5 mg/dL   TSH Rfx On Abnormal To Free T4    Collection Time: 04/28/25  8:14 PM    Specimen: Blood   Result Value Ref Range    TSH 2.820 0.270 - 4.200 uIU/mL   CBC Auto Differential    Collection Time: 04/28/25  8:14 PM    Specimen: Blood   Result Value Ref Range    WBC 12.31 (H) 3.40 - 10.80 10*3/mm3    RBC 5.72 4.14 - 5.80 10*6/mm3    Hemoglobin 15.9 13.0 - 17.7 g/dL    Hematocrit 48.6 37.5 -  51.0 %    MCV 85.0 79.0 - 97.0 fL    MCH 27.8 26.6 - 33.0 pg    MCHC 32.7 31.5 - 35.7 g/dL    RDW 14.0 12.3 - 15.4 %    RDW-SD 42.5 37.0 - 54.0 fl    MPV 10.5 6.0 - 12.0 fL    Platelets 247 140 - 450 10*3/mm3    Neutrophil % 63.8 42.7 - 76.0 %    Lymphocyte % 26.3 19.6 - 45.3 %    Monocyte % 6.9 5.0 - 12.0 %    Eosinophil % 1.5 0.3 - 6.2 %    Basophil % 0.5 0.0 - 1.5 %    Immature Grans % 1.0 (H) 0.0 - 0.5 %    Neutrophils, Absolute 7.85 (H) 1.70 - 7.00 10*3/mm3    Lymphocytes, Absolute 3.24 (H) 0.70 - 3.10 10*3/mm3    Monocytes, Absolute 0.85 0.10 - 0.90 10*3/mm3    Eosinophils, Absolute 0.19 0.00 - 0.40 10*3/mm3    Basophils, Absolute 0.06 0.00 - 0.20 10*3/mm3    Immature Grans, Absolute 0.12 (H) 0.00 - 0.05 10*3/mm3   Scan Slide    Collection Time: 04/28/25  8:14 PM    Specimen: Blood   Result Value Ref Range    RBC Morphology Normal Normal    WBC Morphology Normal Normal    Clumped Platelets Present None Seen   Protime-INR    Collection Time: 04/28/25  9:37 PM    Specimen: Hand, Right; Blood   Result Value Ref Range    Protime 15.8 (H) 11.7 - 14.2 Seconds    INR 1.26 (H) 0.90 - 1.10   aPTT    Collection Time: 04/28/25  9:37 PM    Specimen: Hand, Right; Blood   Result Value Ref Range    PTT 25.4 22.7 - 35.4 seconds   High Sensitivity Troponin T 1Hr    Collection Time: 04/28/25  9:37 PM    Specimen: Hand, Right; Blood   Result Value Ref Range    HS Troponin T 37 (H) <22 ng/L    Troponin T Numeric Delta -4 ng/L    Troponin T % Delta -10 Abnormal if >/= 20%       RADIOLOGY  XR Chest 1 View  Result Date: 4/28/2025  XR CHEST 1 VW-  HISTORY: Male who is 54 years-old, dyspnea  TECHNIQUE: Frontal view of the chest  COMPARISON: 3/7/2025  FINDINGS: The heart size is borderline. Pulmonary vasculature is unremarkable. No focal pulmonary consolidation, pleural effusion, or pneumothorax. No acute osseous process.      No focal pulmonary consolidation. Borderline heart size. Follow-up as clinical indications persist.  This  report was finalized on 4/28/2025 8:56 PM by Dr. Valdo Wells M.D on Workstation: [id number]        MEDICATIONS GIVEN IN ER  Medications   dilTIAZem (CARDIZEM) 100 mg in 100 mL NS infusion (ADV) (2.5 mg/hr Intravenous Restarted 4/28/25 2152)   dilTIAZem (CARDIZEM) injection 10 mg (10 mg Intravenous Given 4/28/25 2017)   sodium chloride 0.9 % bolus 1,000 mL (1,000 mL Intravenous New Bag 4/28/25 2027)       ORDERS PLACED DURING THIS VISIT:  Orders Placed This Encounter   Procedures    XR Chest 1 View    Comprehensive Metabolic Panel    Protime-INR    aPTT    BNP    High Sensitivity Troponin T    Magnesium    Phosphorus    TSH Rfx On Abnormal To Free T4    CBC Auto Differential    High Sensitivity Troponin T 1Hr    Scan Slide    [facility] (on-call MD unless specified)    [facility] (on-call MD unless specified) Details    ECG 12 Lead Chest Pain    Initiate Observation Status    CBC & Differential       OUTPATIENT MEDICATION MANAGEMENT:  Current Facility-Administered Medications Ordered in Epic   Medication Dose Route Frequency Provider Last Rate Last Admin    dilTIAZem (CARDIZEM) 100 mg in 100 mL NS infusion (ADV)  5-15 mg/hr Intravenous Titrated Parvez Rodriguez MD 2.5 mL/hr at 04/28/25 2152 2.5 mg/hr at 04/28/25 2152     Current Outpatient Medications Ordered in Epic   Medication Sig Dispense Refill    albuterol sulfate  (90 Base) MCG/ACT inhaler Inhale 2 puffs Every 4 (Four) Hours As Needed for Wheezing. 8 g 11    furosemide (LASIX) 20 MG tablet Take 1 tablet by mouth Daily for 30 days. 30 tablet 1    hydroCHLOROthiazide 25 MG tablet TAKE 1 TABLET BY MOUTH DAILY 30 tablet 1    lisinopril (PRINIVIL,ZESTRIL) 10 MG tablet Take 1 tablet by mouth Daily. 90 tablet 0    meloxicam (MOBIC) 15 MG tablet TAKE 1 TABLET BY MOUTH DAILY 90 tablet 1    pantoprazole (PROTONIX) 40 MG EC tablet TAKE 1 TABLET BY MOUTH 2 TIMES A  tablet 0    potassium chloride 10 MEQ CR tablet Take 1 tablet by mouth Daily. 30 tablet 2        PROCEDURES  Procedures    PROGRESS, DATA ANALYSIS, CONSULTS, AND MEDICAL DECISION MAKING  All labs have been independently interpreted by me.  All radiology studies have been reviewed by me. All EKG's have been independently viewed and interpreted by me.  Discussion below represents my analysis of pertinent findings related to patient's condition, differential diagnosis, treatment plan and final disposition.    Differential diagnosis includes but is not limited to atrial fibrillation, electrolyte derangement, ACS, dehydration, hypokalemia.    Clinical Scores:                   ED Course as of 04/28/25 2333   Mon Apr 28, 2025 2015 EKG interpreted by myself  Time: 2009  Rate: 158  Rhythm: a fib RVR  No ST elevation or depression  Normal intervals  Multiple PVCs [AB]   2040 Heart rate in the low 130s. [AB]   2105 proBNP(!): 2,059.0 [AB]   2105 Potassium(!): 3.4 [AB]   2105 HS Troponin T(!): 41 [AB]   2105 TSH Baseline: 2.820 [AB]   2106 [id number] Score for Atrial Fibrillation Stroke Risk - MDCalc  Calculated on Apr 28 2025 9:06 PM  1 points -> Stroke risk was 0.6% per year in >90,000 patients (the [place] Atrial Fibrillation Cohort Study) and 0.9% risk of stroke/TIA/systemic embolism. [AB]   2119 WBC(!): 12.31 [AB]   2119 Hemoglobin: 15.9 [AB]   2329 Phone call with Tabby with A.  Discussed the patient, relevant history, exam, diagnostics, ED findings/progress, and concerns. They agree to admit the patient to Dr. Vasquez. Care assumed by the admitting physician at this time.   [AB]   2332 MDM: Patient presents with new onset A-fib with RVR.  Given a diltiazem bolus without much improvement.  Start diltiazem drip.  He has had intermittent episodes of hypotension with this but has recovered well after IV fluids.  Heart rate controlled in the 1 teens at this time.  Elevated BNP and troponin though no evidence of ischemia on EKG.  Admit for further workup and management. [AB]   2332 CRITICAL CARE PERFORMED  BY ED PHYSICIAN    Critical care provider statement:    Critical care time (minutes): 37.   Critical care time was exclusive of:  Separately billable procedures and treating other patients   Critical care was necessary to treat or prevent imminent or life-threatening deterioration of the following conditions:  Cardiac Failure   Critical care was time spent personally by me on the following activities:  Development of treatment plan with patient or surrogate, discussions with consultants, evaluation of patient's response to treatment, examination of patient, obtaining history from patient or surrogate, ordering and performing treatments and interventions, ordering and review of laboratory studies, ordering and review of radiographic studies, pulse oximetry, re-evaluation of patient's condition and review of old charts. Critical Care indicators:      See progress notes for further documentation. [AB]      ED Course User Index  [AB] Parvez Rodriguez MD             AS OF 23:33 EDT VITALS:    BP - 117/76  HR - 112  TEMP - 98.1 °F (36.7 °C) (Oral)  O2 SATS - 96%    COMPLEXITY OF CARE  The patient requires admission.      DIAGNOSIS  Final diagnoses:   Atrial fibrillation with RVR   Elevated troponin   Elevated brain natriuretic peptide (BNP) level         DISPOSITION  ED Disposition       ED Disposition   Decision to Admit    Condition   --    Comment   Level of Care: Telemetry [5]   Diagnosis: Atrial fibrillation with RVR [[id number]]   Admitting Physician: AUBREY STONE [[id number]]   Attending Physician: AUBREY STONE [[id number]]   Is patient appropriate for Inpatient Observation Unit?: No [0]                  Please note that portions of this document were completed with a voice recognition program.    Note Disclaimer: At [facility], we believe that sharing information builds trust and better relationships. You are receiving this note because you recently visited [facility]. It is possible you will see [facility]  information before a provider has talked with you about it. This kind of information can be easy to misunderstand. To help you fully understand what it means for your health, we urge you to discuss this note with your provider.         Parvez Rodriguez MD  04/28/25 [id number]

## 2025-04-29 NOTE — H&P
Patient Name:  Jaime Lyle Jr.  YOB: 1970  MRN:  [id number]  Admit Date:  4/28/2025  Patient Care Team:  Zehra Shen APRN as PCP - General (Nurse Practitioner)  Chandra Nicholson MD as Consulting Physician (Cardiology)      Subjective   History Present Illness     Chief Complaint   Patient presents with    Shortness of Breath    Rapid Heart Rate           History of Present Illness  Patient is a 54-year-old male with a history of HTN,GERD, STEVE on CPAP, mitral valve regurg, presented to the ED complaining of shortness of breath.  Patient reports he is feeling flushed, has intermittent shortness of breath and lightheadedness over the past 3 to 4 days. Symptoms  initially last 2 to 3 minutes and resolve, however lasted several hours on day of presentation .  Patient was evaluated by the medics at [employer] where he works and was found to be in A-fib with RVR.  Denies chest pain or palpitations.  Denies history of prior atrial fibrillation, reports prior history of bradycardia, asymptomatic, and was seen by cardiology.  Was recently diagnosed with hypertension and started on Lasix, lisinopril and HCTZ.  Patient notes  increased cough since  has been initiated on lisinopril.        Review of Systems   GENERAL: No fevers, chills, sweats.    RESPIRATORY: No cough,+ shortness of breath, hemoptysis or wheezing.   CVS: No chest pain, palpitations, orthopnea, dyspnea on exertion   GI: No melena or hematochezia. No abdominal pain. No nausea, vomiting, constipation, diarrhea    Personal History     Past Medical History:   Diagnosis Date    Acid reflux     Acute medial meniscus tear     left knee    Community acquired pneumonia     Mild mitral regurgitation     Obesity (BMI 35.0-39.9 without comorbidity)     STEVE (obstructive sleep apnea)     Pulmonic regurgitation     trace    PVC's (premature ventricular contractions)     Tricuspid regurgitation     trace     Past Surgical History:   Procedure  Laterality Date    LAPAROSCOPIC GASTRIC BANDING      ROTATOR CUFF REPAIR       Family History   Problem Relation Age of Onset    Heart disease Mother 50    Diabetes Father     Sudden death Maternal Uncle 52    Stroke Maternal Grandmother 50    Diabetes Paternal Grandmother     Diabetes Paternal Grandfather     Hypertension Other     Heart disease Other         ischemic    Diabetes Other      Social History     Tobacco Use    Smoking status: Light Smoker     Types: Cigars     Passive exposure: Current    Smokeless tobacco: Never    Tobacco comments:     1 cigar monthly   Vaping Use    Vaping status: Never Used   Substance Use Topics    Alcohol use: Yes     Comment: caffeine use    Drug use: Never     No current facility-administered medications on file prior to encounter.     Current Outpatient Medications on File Prior to Encounter   Medication Sig Dispense Refill    albuterol sulfate  (90 Base) MCG/ACT inhaler Inhale 2 puffs Every 4 (Four) Hours As Needed for Wheezing. 8 g 11    furosemide (LASIX) 20 MG tablet Take 1 tablet by mouth Daily for 30 days. 30 tablet 1    hydroCHLOROthiazide 25 MG tablet TAKE 1 TABLET BY MOUTH DAILY 30 tablet 1    lisinopril (PRINIVIL,ZESTRIL) 10 MG tablet Take 1 tablet by mouth Daily. 90 tablet 0    meloxicam (MOBIC) 15 MG tablet TAKE 1 TABLET BY MOUTH DAILY 90 tablet 1    pantoprazole (PROTONIX) 40 MG EC tablet TAKE 1 TABLET BY MOUTH 2 TIMES A  tablet 0    potassium chloride 10 MEQ CR tablet Take 1 tablet by mouth Daily. 30 tablet 2     No Known Allergies    Objective    Objective     Vital Signs  Temp:  [98 °F (36.7 °C)-98.1 °F (36.7 °C)] 98 °F (36.7 °C)  Heart Rate:  [] 115  Resp:  [16-18] 16  BP: ()/() 146/108  SpO2:  [91 %-97 %] 97 %  on   ;   Device (Oxygen Therapy): room air  Body mass index is 43.6 kg/m².    Physical Exam  General: Alert and oriented x3, no acute distress.  HEENT: Normocephalic, atraumatic  Eyes: PERRL, EOMI, anicteric  sclerae  Lungs: Clear to auscultation bilaterally, no crackles or wheezes  CV: Atrial fibrillation with RVR  Abdomen: Soft, nontender, nondistended.  Normoactive bowel sounds  Extremities: Trace lower extremity edema  Skin: Clean/dry/intact, no rashes  Neuro: Cranial nerves II through XII intact, no gross focal neurological deficits appreciated  Psych: Appropriate mood and affect    Results Review:  I reviewed the patient's new clinical results.  I reviewed the patient's new imaging results and agree with the interpretation.  I reviewed the patient's other test results and agree with the interpretation  I personally viewed and interpreted the patient's EKG/Telemetry data  Discussed with ED provider.    Lab Results (last 24 hours)       Procedure Component Value Units Date/Time    CBC & Differential [[id number]]  (Abnormal) Collected: 04/28/25 2014    Specimen: Blood Updated: 04/28/25 2113    Narrative:      The following orders were created for panel order CBC & Differential.  Procedure                               Abnormality         Status                     ---------                               -----------         ------                     CBC Auto Differential[[id number]]        Abnormal            Final result               Scan Slide[[id number]]                                       Final result                 Please view results for these tests on the individual orders.    Comprehensive Metabolic Panel [[id number]]  (Abnormal) Collected: 04/28/25 2014    Specimen: Blood Updated: 04/28/25 2050     Glucose 120 mg/dL      BUN 18 mg/dL      Creatinine 1.16 mg/dL      Sodium 137 mmol/L      Potassium 3.4 mmol/L      Chloride 103 mmol/L      CO2 22.0 mmol/L      Calcium 9.2 mg/dL      Total Protein 7.2 g/dL      Albumin 4.0 g/dL      ALT (SGPT) 24 U/L      AST (SGOT) 17 U/L      Alkaline Phosphatase 71 U/L      Total Bilirubin 0.6 mg/dL      Globulin 3.2 gm/dL      A/G Ratio 1.3 g/dL      BUN/Creatinine Ratio  15.5     Anion Gap 12.0 mmol/L      eGFR 73.9 mL/min/1.73     Narrative:      GFR Categories in Chronic Kidney Disease (CKD)      GFR Category          GFR (mL/min/1.73)    Interpretation  G1                     90 or greater         Normal or high (1)  G2                      60-89                Mild decrease (1)  G3a                   45-59                Mild to moderate decrease  G3b                   30-44                Moderate to severe decrease  G4                    15-29                Severe decrease  G5                    14 or less           Kidney failure          (1)In the absence of evidence of kidney disease, neither GFR category G1 or G2 fulfill the criteria for CKD.    eGFR calculation 2021 CKD-EPI creatinine equation, which does not include race as a factor    BNP [[id number]]  (Abnormal) Collected: 04/28/25 2014    Specimen: Blood Updated: 04/28/25 2051     proBNP 2,059.0 pg/mL     Narrative:      This assay is used as an aid in the diagnosis of individuals suspected of having heart failure. It can be used as an aid in the diagnosis of acute decompensated heart failure (ADHF) in patients presenting with signs and symptoms of ADHF to the emergency department (ED). In addition, NT-proBNP of <300 pg/mL indicates ADHF is not likely.    Age Range Result Interpretation  NT-proBNP Concentration (pg/mL:      <50             Positive            >450                   Gray                 300-450                    Negative             <300    50-75           Positive            >900                  Gray                300-900                  Negative            <300      >75             Positive            >1800                  Gray                300-1800                  Negative            <300    High Sensitivity Troponin T [[id number]]  (Abnormal) Collected: 04/28/25 2014    Specimen: Blood Updated: 04/28/25 2051     HS Troponin T 41 ng/L     Narrative:      High Sensitive Troponin T Reference  Range:  <14.0 ng/L- Negative Female for AMI  <22.0 ng/L- Negative Male for AMI  >=14 - Abnormal Female indicating possible myocardial injury.  >=22 - Abnormal Male indicating possible myocardial injury.   Clinicians would have to utilize clinical acumen, EKG, Troponin, and serial changes to determine if it is an Acute Myocardial Infarction or myocardial injury due to an underlying chronic condition.         Magnesium [[id number]]  (Normal) Collected: 04/28/25 2014    Specimen: Blood Updated: 04/28/25 2050     Magnesium 2.0 mg/dL     Phosphorus [[id number]]  (Normal) Collected: 04/28/25 2014    Specimen: Blood Updated: 04/28/25 2050     Phosphorus 2.8 mg/dL     TSH Rfx On Abnormal To Free T4 [[id number]]  (Normal) Collected: 04/28/25 2014    Specimen: Blood Updated: 04/28/25 2051     TSH 2.820 uIU/mL     CBC Auto Differential [[id number]]  (Abnormal) Collected: 04/28/25 2014    Specimen: Blood Updated: 04/28/25 2113     WBC 12.31 10*3/mm3      RBC 5.72 10*6/mm3      Hemoglobin 15.9 g/dL      Hematocrit 48.6 %      MCV 85.0 fL      MCH 27.8 pg      MCHC 32.7 g/dL      RDW 14.0 %      RDW-SD 42.5 fl      MPV 10.5 fL      Platelets 247 10*3/mm3      Comment: Platelet clumping noted. Platelet count may be falsely decreased due to platelet clumping.    Slide reviewed by technologist          Neutrophil % 63.8 %      Lymphocyte % 26.3 %      Monocyte % 6.9 %      Eosinophil % 1.5 %      Basophil % 0.5 %      Immature Grans % 1.0 %      Neutrophils, Absolute 7.85 10*3/mm3      Lymphocytes, Absolute 3.24 10*3/mm3      Monocytes, Absolute 0.85 10*3/mm3      Eosinophils, Absolute 0.19 10*3/mm3      Basophils, Absolute 0.06 10*3/mm3      Immature Grans, Absolute 0.12 10*3/mm3     Scan Slide [[id number]] Collected: 04/28/25 2014    Specimen: Blood Updated: 04/28/25 2113     RBC Morphology Normal     WBC Morphology Normal     Clumped Platelets Present    Protime-INR [[id number]]  (Abnormal) Collected: 04/28/25 2137    Specimen: Blood  from Hand, Right Updated: 04/28/25 2153     Protime 15.8 Seconds      INR 1.26    aPTT [[id number]]  (Normal) Collected: 04/28/25 2137    Specimen: Blood from Hand, Right Updated: 04/28/25 2153     PTT 25.4 seconds     High Sensitivity Troponin T 1Hr [[id number]]  (Abnormal) Collected: 04/28/25 2137    Specimen: Blood from Hand, Right Updated: 04/28/25 2205     HS Troponin T 37 ng/L      Troponin T Numeric Delta -4 ng/L      Troponin T % Delta -10    Narrative:      High Sensitive Troponin T Reference Range:  <14.0 ng/L- Negative Female for AMI  <22.0 ng/L- Negative Male for AMI  >=14 - Abnormal Female indicating possible myocardial injury.  >=22 - Abnormal Male indicating possible myocardial injury.   Clinicians would have to utilize clinical acumen, EKG, Troponin, and serial changes to determine if it is an Acute Myocardial Infarction or myocardial injury due to an underlying chronic condition.                 Imaging Results (Last 24 Hours)       Procedure Component Value Units Date/Time    XR Chest 1 View [[id number]] Collected: 04/28/25 2051     Updated: 04/28/25 2059    Narrative:      XR CHEST 1 VW-     HISTORY: Male who is 54 years-old, dyspnea     TECHNIQUE: Frontal view of the chest     COMPARISON: 3/7/2025     FINDINGS: The heart size is borderline. Pulmonary vasculature is  unremarkable. No focal pulmonary consolidation, pleural effusion, or  pneumothorax. No acute osseous process.       Impression:      No focal pulmonary consolidation. Borderline heart size.  Follow-up as clinical indications persist.     This report was finalized on 4/28/2025 8:56 PM by Dr. Valdo Wells M.D on Workstation: [id number]               Results for orders placed in visit on 11/05/14    SCANNED - ECHOCARDIOGRAM      ECG 12 Lead Chest Pain   Preliminary Result   HEART QNYP=357  bpm   RR Phwkkbxn=702  ms   HI Interval=  ms   P Horizontal Axis=  deg   P Front Axis=  deg   QRSD Qoftnaes=298  ms   QT Byvrewvl=986  ms   XBzW=797   ms   QRS Axis=35  deg   T Wave Axis=9  deg   - ABNORMAL ECG -   Atrial fibrillation   Paired ventricular premature complexes   Borderline T abnormalities, inferior leads   Borderline prolonged QT interval   Date and Time of Study:2025-04-28 20:09:32           Assessment/Plan     Active Hospital Problems    Diagnosis  POA    **Atrial fibrillation with RVR [I48.91]  Yes    STEVE (obstructive sleep apnea) [G47.33]  Yes    Gastroesophageal reflux disease [K21.9]  Yes    Essential hypertension [I10]  Yes      Resolved Hospital Problems   No resolved problems to display.     New onset atrial fibrillation with RVR  - EKG on admission showed A-fib, high-sensitivity troponin mildly elevated but flat.  -Denies specific chest pain or palpitations.  -X-ray showed no focal pulmonary consolidation. Borderline heart size.  --BNP elevated at 2059  -TSH normal  - Initiated on diltiazem drip, continue with target for heart rate below 120.  Initiated on metoprolol titrate 12.5 twice daily.  Order echocardiogram  - Cardiology consult  - [id number] score of 1 for history of hypertension.  No history of diabetes, prior stroke, CHF.  Echocardiogram pending.  Defer anticoagulation to cardiology  -Resume Lasix    HTN   -recently diagnosed and initiated on lisinopril, HCTZ, Lasix  - Reports increased cough with lisinopril, discontinue  -On diltiazem drip currently, BP acutely stable.  Initiated on metoprolol for A-fib as above.  -    Hyperglycemia  -Most recent hemoglobin A1c was 5.4    Hypokalemia  - Potassium was 3.4 on admission.  Status post replacement, normalized.  Monitor daily electrolytes.      STEVE on CPAP  -Okay to use home CPAP if available.  As needed oxygen supplement.  -          VTE Prophylaxis -initiate therapeutic Lovenox for A-fib  Code Status - Full code.       Tosha Badillo MD  [facility]  04/29/25  06:42 EDT

## 2025-04-29 NOTE — CONSULTS
[facility]  Consult Note                                                                              4/29/2025  Parvez Rodriguez MD    Patient Identification:  Jaime Lyle Jr.:   54 y.o.  male  1970     Date of Admission:4/28/2025    CC: atrial fibrillation with RVR     History of Present Illness:  Mr. Lyle is a 54 year old male with history of obstructive sleep reflux disease, obesity who presented to the ED on 4/28/25 via EMS with complaints of SOA and feeling flushed for the last few days. He states the shortness of breath only lasts a few seconds but was at work and the episode lasted for 2-3 hours. He was evaluated by the medics at his workplace and found to be in new onset a fib. In the ED he was started on a cardizem drip without bolus.  As needed chest pain or worsening shortness of breath.  Concerns Jimenez amounts of caffeinated beverages which she has decreased recently.  Blood pressure had not been elevating.  Recent respiratory infection treated with steroids.  She actually states this is when he started feeling well.  He initially had some hypotension which improved with IV fluids. He has recently diagnosed HTN and was started on lasix, lisinopril, and hctz. His medical history includes obesity, PVCs, GERD, STEVE, mirtral, tricuspid, and pulmonic valve regurgitation. He has had negative stress tests in 2017 and 2020. He was seen in our office in 2018 by my partner Dr. Oconnell for chronic asymptomatic PVCs, he had a stress test and he was cleared to continue working as a  at that time    ER lab values include troponin 41/37, BNP 2059, unremarkable CMP and CBC, INR 1.26.,  TSH normal  Chest X-ray   IMPRESSION:No focal pulmonary consolidation. Borderline heart size.Follow-up as clinical indications persist.    Stress test 2020   No ECG evidence of myocardial ischemia.Negative clinical evidence of myocardial ischemia. Findings consistent with a normal ECG stress test. PVCs  resolve with exercise.  He received diltiazem infusion last night and received Lasix this morning.  Scheduled to get Lovenox today.  Also started on low-dose metoprolol orally.      Stress test 2017    Overnight tachycardia has resolved.  Blood pressure somewhat low intermittently during the night.  Today heart rate 91 bpm blood pressure 103/65 mmHg.  Echo today with LV dysfunction EF 40 to 45% with no significant valvular heart disease.    Past Medical History:  Past Medical History:   Diagnosis Date    Acid reflux     Acute medial meniscus tear     left knee    Community acquired pneumonia     Mild mitral regurgitation     Obesity (BMI 35.0-39.9 without comorbidity)     STEVE (obstructive sleep apnea)     Pulmonic regurgitation     trace    PVC's (premature ventricular contractions)     Tricuspid regurgitation     trace       Past Surgical History:  Past Surgical History:   Procedure Laterality Date    LAPAROSCOPIC GASTRIC BANDING      ROTATOR CUFF REPAIR         Allergies:  No Known Allergies    Home Meds:  Medications Prior to Admission   Medication Sig Dispense Refill Last Dose/Taking    albuterol sulfate  (90 Base) MCG/ACT inhaler Inhale 2 puffs Every 4 (Four) Hours As Needed for Wheezing. 8 g 11     furosemide (LASIX) 20 MG tablet Take 1 tablet by mouth Daily for 30 days. 30 tablet 1     hydroCHLOROthiazide 25 MG tablet TAKE 1 TABLET BY MOUTH DAILY 30 tablet 1     lisinopril (PRINIVIL,ZESTRIL) 10 MG tablet Take 1 tablet by mouth Daily. 90 tablet 0     meloxicam (MOBIC) 15 MG tablet TAKE 1 TABLET BY MOUTH DAILY 90 tablet 1     pantoprazole (PROTONIX) 40 MG EC tablet TAKE 1 TABLET BY MOUTH 2 TIMES A  tablet 0     potassium chloride 10 MEQ CR tablet Take 1 tablet by mouth Daily. 30 tablet 2        Current Meds  Scheduled Meds:enoxaparin sodium, 40 mg, Subcutaneous, BID  furosemide, 20 mg, Oral, Daily  metoprolol tartrate, 12.5 mg, Oral, Q12H  pantoprazole, 40 mg, Oral, BID  sodium chloride, 10 mL,  Intravenous, Q12H      Continuous Infusions:dilTIAZem, 5-15 mg/hr, Last Rate: 5 mg/hr (04/29/25 1213)  Pharmacy to Dose enoxaparin (LOVENOX),       Social History:   Social History     Socioeconomic History    Marital status:    Tobacco Use    Smoking status: Light Smoker     Types: Cigars     Passive exposure: Current    Smokeless tobacco: Never    Tobacco comments:     1 cigar monthly   Vaping Use    Vaping status: Never Used   Substance and Sexual Activity    Alcohol use: Yes     Comment: caffeine use    Drug use: Never    Sexual activity: Defer       Family History:  Family History   Problem Relation Age of Onset    Heart disease Mother 50    Diabetes Father     Sudden death Maternal Uncle 52    Stroke Maternal Grandmother 50    Diabetes Paternal Grandmother     Diabetes Paternal Grandfather     Hypertension Other     Heart disease Other         ischemic    Diabetes Other        REVIEW OF SYSTEMS:   CONSTITUTIONAL: No weight loss, fever, chills, weakness or fatigue.   HEENT: Eyes: No visual loss, blurred vision, double vision or yellow sclerae. Ears, Nose, Throat: No hearing loss, sneezing, congestion, runny nose or sore throat.   SKIN: No rash or itching.     RESPIRATORY: No hemoptysis, or sputum.   GASTROINTESTINAL: No anorexia, nausea, vomiting or diarrhea. No abdominal pain, bright red blood per rectum or melena.  GENITOURINARY: No burning on urination, hematuria or increased frequency.  NEUROLOGICAL: No headache, dizziness, syncope, paralysis, ataxia, numbness or tingling in the extremities. No change in bowel or bladder control.   MUSCULOSKELETAL: No muscle, back pain, joint pain or stiffness.   HEMATOLOGIC: No anemia, bleeding or bruising.   PSYCHIATRIC: No history of depression, anxiety, hallucinations.   ENDOCRINOLOGIC: No reports of sweating, cold or heat intolerance. No polyuria or polydipsia.     Physical Exam    /65   Pulse 91   Temp 98.2 °F (36.8 °C) (Oral)   Resp 16   Ht 182 cm  "(71.65\")   Wt (!) 146 kg (321 lb 14 oz)   SpO2 94%   BMI 44.08 kg/m²     General Appearance Well developed, cooperative and well nourished and no acute distress   Head Normocephalic, without abnormality, atraumatic   Ears Ears appear intact with no abnormalities noted   Throat No oral lesions, no thrush, oral mucosa moist   Neck No adenopathy, supple, trachea midline, no thyromegaly, no carotid bruit,    Back No skin lesions, erythema or scars, no tenderness to percussion or palpation,range of motion is normal   Lungs Clear to auscultation,respirations regular, even and unlabored   Heart Irregular rhythm and normal rate, normal S1 and S2, no murmur, no gallop, no rub, no click   Chest wall No abnormalities observed   Abdomen Normal bowel sounds, no masses, no hepatomegaly,    Extremities Moves all extremities well, no edema, no cyanosis, no redness   Pulses Pulses palpable and equal bilaterally. Normal radial, carotid, femoral, dorsalis pedis and posterior tibial pulses bilaterally.   Skin No bleeding, bruising or rash   Psychiatric Alert and oriented x 3, normal mood and affect     Results from last 7 days   Lab Units 04/29/25  0749   SODIUM mmol/L 138   POTASSIUM mmol/L 4.1   CHLORIDE mmol/L 105   CO2 mmol/L 24.0   BUN mg/dL 16   CREATININE mg/dL 0.92   CALCIUM mg/dL 8.8   BILIRUBIN mg/dL 0.8   ALK PHOS U/L 60   ALT (SGPT) U/L 21   AST (SGOT) U/L 17   GLUCOSE mg/dL 107*     Results from last 7 days   Lab Units 04/28/25 2137 04/28/25 2014   HSTROP T ng/L 37* 41*     Results from last 7 days   Lab Units 04/29/25  0749 04/28/25 2014   WBC 10*3/mm3 7.83 12.31*   HEMOGLOBIN g/dL 14.7 15.9   HEMATOCRIT % 44.6 48.6   PLATELETS 10*3/mm3 229 247     Results from last 7 days   Lab Units 04/28/25 2137   INR  1.26*   APTT seconds 25.4     Results from last 7 days   Lab Units 04/29/25  0749   MAGNESIUM mg/dL 2.1     Results from last 7 days   Lab Units 04/28/25 2014   PROBNP pg/mL 2,059.0*     Results from last 7 days "   Lab Units 04/28/25 2014   TSH uIU/mL 2.820       I personally viewed and interpreted the patient's EKG/Telemetry data  I have reviewed HPI and ROS above.    Assessment and Plan  1.  Atrial fibrillation with rapid ventricular rates.  Recommendations as below.  Also avoid stimulants.  Reviewed with patient and family that he is actually unaware of when he is in atrial fibrillation or 2 positions for several days.  Start Xarelto he will avoid all stimulants also reviewed with him he must avoid all nonsteroidals to decrease risk of internal including intracranial bleeding bleeding  2.  Acute HFrEF, possibly due to atrial fibrillation though unclear which was present first.  Hypotension complicating medical therapy.  Has been started low-dose metoprolol.  Continue with this as well as anticoagulant therapy.  Will need to start Lovenox until we decide on NOAC.  Patient has also had pneumonia several months ago treated with steroids which was the onset of hypertension.  Therefore this may be related to the pneumonic process.  In addition he has not been using CPAP for the past several months which may also contribute to LV dysfunction.  Will start him on Toprol, IV Cardizem.  In addition another blood pressure in a.m. will add losartan cautiously.  He has an ACE inhibitor cough.  Depending on blood pressure we will also add spironolactone.  In addition we will eventually need coronary evaluation next week or 2 but this can be done as an outpatient  3.  Obstructive sleep apnea.  He will resume use of CPAP.  4.  Obesity  5.  History of PVCs  6.  Hyperglycemia    [employer]  4/29/2025  14:45 EDT    55min spent in reviewing records, discussion and examination of the patient and discussion with other members of the patient's medical team.     Dictated utilizing Dragon dictation

## 2025-04-30 ENCOUNTER — READMISSION MANAGEMENT (OUTPATIENT)
Dept: CALL CENTER | Facility: HOSPITAL | Age: 55
End: 2025-04-30
Payer: COMMERCIAL

## 2025-04-30 VITALS
BODY MASS INDEX: 42.66 KG/M2 | OXYGEN SATURATION: 97 % | WEIGHT: 315 LBS | SYSTOLIC BLOOD PRESSURE: 99 MMHG | DIASTOLIC BLOOD PRESSURE: 66 MMHG | TEMPERATURE: 98.7 F | RESPIRATION RATE: 16 BRPM | HEART RATE: 138 BPM | HEIGHT: 72 IN

## 2025-04-30 DIAGNOSIS — Z00.00 ANNUAL PHYSICAL EXAM: ICD-10-CM

## 2025-04-30 PROBLEM — I50.21 ACUTE SYSTOLIC HEART FAILURE: Status: ACTIVE | Noted: 2025-04-30

## 2025-04-30 LAB
ANION GAP SERPL CALCULATED.3IONS-SCNC: 10.9 MMOL/L (ref 5–15)
BUN SERPL-MCNC: 19 MG/DL (ref 6–20)
BUN/CREAT SERPL: 19 (ref 7–25)
CALCIUM SPEC-SCNC: 8.7 MG/DL (ref 8.6–10.5)
CHLORIDE SERPL-SCNC: 108 MMOL/L (ref 98–107)
CO2 SERPL-SCNC: 22.1 MMOL/L (ref 22–29)
CREAT SERPL-MCNC: 1 MG/DL (ref 0.76–1.27)
DEPRECATED RDW RBC AUTO: 41.3 FL (ref 37–54)
EGFRCR SERPLBLD CKD-EPI 2021: 88.3 ML/MIN/1.73
ERYTHROCYTE [DISTWIDTH] IN BLOOD BY AUTOMATED COUNT: 13.5 % (ref 12.3–15.4)
GLUCOSE SERPL-MCNC: 101 MG/DL (ref 65–99)
HCT VFR BLD AUTO: 43.6 % (ref 37.5–51)
HGB BLD-MCNC: 14.6 G/DL (ref 13–17.7)
MAGNESIUM SERPL-MCNC: 2.2 MG/DL (ref 1.6–2.6)
MCH RBC QN AUTO: 28.3 PG (ref 26.6–33)
MCHC RBC AUTO-ENTMCNC: 33.5 G/DL (ref 31.5–35.7)
MCV RBC AUTO: 84.5 FL (ref 79–97)
PHOSPHATE SERPL-MCNC: 2.6 MG/DL (ref 2.5–4.5)
PLATELET # BLD AUTO: 240 10*3/MM3 (ref 140–450)
PMV BLD AUTO: 10.1 FL (ref 6–12)
POTASSIUM SERPL-SCNC: 3.9 MMOL/L (ref 3.5–5.2)
RBC # BLD AUTO: 5.16 10*6/MM3 (ref 4.14–5.8)
SODIUM SERPL-SCNC: 141 MMOL/L (ref 136–145)
WBC NRBC COR # BLD AUTO: 8.43 10*3/MM3 (ref 3.4–10.8)

## 2025-04-30 PROCEDURE — 84100 ASSAY OF PHOSPHORUS: CPT | Performed by: STUDENT IN AN ORGANIZED HEALTH CARE EDUCATION/TRAINING PROGRAM

## 2025-04-30 PROCEDURE — 83735 ASSAY OF MAGNESIUM: CPT | Performed by: STUDENT IN AN ORGANIZED HEALTH CARE EDUCATION/TRAINING PROGRAM

## 2025-04-30 PROCEDURE — 80048 BASIC METABOLIC PNL TOTAL CA: CPT | Performed by: STUDENT IN AN ORGANIZED HEALTH CARE EDUCATION/TRAINING PROGRAM

## 2025-04-30 PROCEDURE — G0378 HOSPITAL OBSERVATION PER HR: HCPCS

## 2025-04-30 PROCEDURE — 83880 ASSAY OF NATRIURETIC PEPTIDE: CPT | Performed by: EMERGENCY MEDICINE

## 2025-04-30 PROCEDURE — 85027 COMPLETE CBC AUTOMATED: CPT | Performed by: STUDENT IN AN ORGANIZED HEALTH CARE EDUCATION/TRAINING PROGRAM

## 2025-04-30 PROCEDURE — 36415 COLL VENOUS BLD VENIPUNCTURE: CPT | Performed by: STUDENT IN AN ORGANIZED HEALTH CARE EDUCATION/TRAINING PROGRAM

## 2025-04-30 PROCEDURE — 99232 SBSQ HOSP IP/OBS MODERATE 35: CPT

## 2025-04-30 PROCEDURE — 96366 THER/PROPH/DIAG IV INF ADDON: CPT

## 2025-04-30 RX ORDER — METOPROLOL SUCCINATE 25 MG/1
25 TABLET, EXTENDED RELEASE ORAL ONCE
Status: DISCONTINUED | OUTPATIENT
Start: 2025-04-30 | End: 2025-04-30 | Stop reason: HOSPADM

## 2025-04-30 RX ORDER — METOPROLOL SUCCINATE 50 MG/1
50 TABLET, EXTENDED RELEASE ORAL
Status: DISCONTINUED | OUTPATIENT
Start: 2025-05-01 | End: 2025-04-30

## 2025-04-30 RX ORDER — METOPROLOL SUCCINATE 25 MG/1
37.5 TABLET, EXTENDED RELEASE ORAL
Status: DISCONTINUED | OUTPATIENT
Start: 2025-05-01 | End: 2025-04-30 | Stop reason: HOSPADM

## 2025-04-30 RX ORDER — METOPROLOL SUCCINATE 25 MG/1
37.5 TABLET, EXTENDED RELEASE ORAL
Qty: 45 TABLET | Refills: 0 | Status: SHIPPED | OUTPATIENT
Start: 2025-05-01 | End: 2025-05-03 | Stop reason: HOSPADM

## 2025-04-30 RX ORDER — FUROSEMIDE 20 MG/1
20 TABLET ORAL DAILY
Qty: 30 TABLET | Refills: 0 | Status: SHIPPED | OUTPATIENT
Start: 2025-04-30 | End: 2025-05-30

## 2025-04-30 RX ADMIN — PANTOPRAZOLE SODIUM 40 MG: 40 TABLET, DELAYED RELEASE ORAL at 08:03

## 2025-04-30 RX ADMIN — Medication 10 ML: at 08:05

## 2025-04-30 RX ADMIN — METOPROLOL SUCCINATE 25 MG: 25 TABLET, EXTENDED RELEASE ORAL at 08:03

## 2025-04-30 NOTE — DISCHARGE SUMMARY
Patient Name: Jaime Lyle Jr.  : 1970  MRN: [id number]    Date of Admission: 2025  Date of Discharge:  2025  Primary Care Physician: Zehra Shen APRN      Chief Complaint:   Shortness of Breath and Rapid Heart Rate      Discharge Diagnoses     Active Hospital Problems    Diagnosis  POA    **Atrial fibrillation with RVR [I48.91]  Yes    Acute systolic heart failure [I50.21]  Unknown    STEVE (obstructive sleep apnea) [G47.33]  Yes    Gastroesophageal reflux disease [K21.9]  Yes    Essential hypertension [I10]  Yes      Resolved Hospital Problems   No resolved problems to display.        Hospital Course     Patient is a 54-year-old male with a history of HTN,GERD, STEVE on CPAP, mitral valve regurg, presented to the ED complaining of shortness of breath.  Patient reports he is feeling flushed, has intermittent shortness of breath and lightheadedness over the past 3 to 4 days. Symptoms  initially last 2 to 3 minutes and resolve, however lasted several hours on day of presentation .  Patient was evaluated by the medics at [employer] where he works and was found to be in A-fib .    New onset atrial fibrillation with RVR  - EKG on admission showed A-fib, high-sensitivity troponin mildly elevated but flat.  -Denied specific chest pain or palpitations.  -X-ray showed no focal pulmonary consolidation. Borderline heart size.  --BNP elevated at 9  -TSH was up normal  - Was initiated on Cardizem drip, p.o. metoprolol, cardiology was consulted.  Xarelto was added for anticoagulation.  - Echocardiogram was obtained Left ventricular which showed ventricular ejection fraction appears to be 41 - 45%.  -Goal-directed medical therapy was limited due to soft blood pressure.  Patient was cleared to be discharged from cardiology standpoint with outpatient follow-up.  Patient was discharged home on metoprolol XL 27.5 mg daily.  Outpatient Lasix was continued.  Patient  has follow-up with primary  cardiologist next week.      -  HTN  -Recently diagnosed and initiated on lisinopril, HCTZ   - Lisinopril was discontinued due to cough and soft BP.  Possible initiation of losartan for acute systolic heart failure as above acute intolerance.  Discussed with patient that losartan can also cause cough although less likely, and if develops while on losartan will need to stop losartan and notify cardiology/primary care.  -Discontinued HCTZ due to soft BP.  Initiated on low-dose metoprolol as above.    Obesity  - BMI 44.08.  Weight loss, diet strongly encouraged.       Hyperglycemia  -Most recent hemoglobin A1c was 5.4           At the time of discharge patient was told to take all medications as prescribed, keep all follow-up appointments, and call their doctor or return to the hospital with any worsening or concerning symptoms.             Day of Discharge     Subjective:  Patient seen this morning.  Denies complaints.  No chest pain or palpitations.  Denies dizziness, has been ambulating.  Eager to be discharged home.    Physical Exam:  Temp:  [97.5 °F (36.4 °C)-98.7 °F (37.1 °C)] 98.7 °F (37.1 °C)  Heart Rate:  [] 138  Resp:  [16] 16  BP: ()/(66-96) 99/66  Body mass index is 44.08 kg/m².  Physical Exam    General: Alert and oriented x3, no acute distress, obese  HEENT: Normocephalic, atraumatic  CV: Irregular rhythm, heart rate 100-110s while in the room  Lungs: Clear to auscultation bilaterally, no crackles or wheezes  Abdomen: Soft, nontender, nondistended  Extremities: Trace peripheral edema , no cyanosis       Consultants     Consult Orders (all) (From admission, onward)       Start     Ordered    04/29/25 1210  Inpatient Cardiology Consult  Once        Specialty:  Cardiology  Provider:  Julio Choudhury MD    04/29/25 1209    04/28/25 2252  [facility] (on-call MD unless specified) Details  Once        Specialty:  Hospitalist  Provider:  (Not yet assigned)    04/28/25 2251 04/28/25 2120  [facility] (on-call  MD unless specified)  Once        Specialty:  Cardiology  Provider:  (Not yet assigned)    04/28/25 2121                  Procedures     * Surgery not found *      Imaging Results (All)       Procedure Component Value Units Date/Time    XR Chest 1 View [[id number]] Collected: 04/28/25 2051     Updated: 04/28/25 2059    Narrative:      XR CHEST 1 VW-     HISTORY: Male who is 54 years-old, dyspnea     TECHNIQUE: Frontal view of the chest     COMPARISON: 3/7/2025     FINDINGS: The heart size is borderline. Pulmonary vasculature is  unremarkable. No focal pulmonary consolidation, pleural effusion, or  pneumothorax. No acute osseous process.       Impression:      No focal pulmonary consolidation. Borderline heart size.  Follow-up as clinical indications persist.     This report was finalized on 4/28/2025 8:56 PM by Dr. Valdo Wells M.D on Workstation: [employer]               Results for orders placed during the hospital encounter of 04/28/25    Adult Transthoracic Echo Complete W/ Cont if Necessary Per Protocol    Interpretation Summary    Left ventricular systolic function is mildly decreased. Left ventricular ejection fraction appears to be 41 - 45%.    Left ventricular diastolic function was indeterminate.    Pertinent Labs     Results from last 7 days   Lab Units 04/30/25  0534 04/29/25  0749 04/28/25 2014   WBC 10*3/mm3 8.43 7.83 12.31*   HEMOGLOBIN g/dL 14.6 14.7 15.9   PLATELETS 10*3/mm3 240 229 247     Results from last 7 days   Lab Units 04/30/25  0534 04/29/25  0749 04/28/25 2014   SODIUM mmol/L 141 138 137   POTASSIUM mmol/L 3.9 4.1 3.4*   CHLORIDE mmol/L 108* 105 103   CO2 mmol/L 22.1 24.0 22.0   BUN mg/dL 19 16 18   CREATININE mg/dL 1.00 0.92 1.16   GLUCOSE mg/dL 101* 107* 120*   Estimated Creatinine Clearance: 121.3 mL/min (by C-G formula based on SCr of 1 mg/dL).  Results from last 7 days   Lab Units 04/29/25  0749 04/28/25 2014   ALBUMIN g/dL 3.5 4.0   BILIRUBIN mg/dL 0.8 0.6   ALK PHOS U/L 60 71  "  AST (SGOT) U/L 17 17   ALT (SGPT) U/L 21 24     Results from last 7 days   Lab Units 04/30/25  0534 04/29/25  0749 04/28/25 2014   CALCIUM mg/dL 8.7 8.8 9.2   ALBUMIN g/dL  --  3.5 4.0   MAGNESIUM mg/dL 2.2 2.1 2.0   PHOSPHORUS mg/dL 2.6 2.9 2.8       Results from last 7 days   Lab Units 04/28/25  2137 04/28/25 2014   HSTROP T ng/L 37* 41*   PROBNP pg/mL  --  2,059.0*           Invalid input(s): \"LDLCALC\"          Test Results Pending at Discharge       Discharge Details        Discharge Medications        New Medications        Instructions Start Date   metoprolol succinate XL 25 MG 24 hr tablet  Commonly known as: TOPROL-XL   37.5 mg, Oral, Every 24 Hours Scheduled   Start Date: May 1, 2025     Xarelto 20 MG tablet  Generic drug: rivaroxaban   20 mg, Oral, Daily With Dinner             Continue These Medications        Instructions Start Date   albuterol sulfate  (90 Base) MCG/ACT inhaler  Commonly known as: PROVENTIL HFA;VENTOLIN HFA;PROAIR HFA   2 puffs, Inhalation, Every 4 Hours PRN      furosemide 20 MG tablet  Commonly known as: LASIX   20 mg, Oral, Daily      pantoprazole 40 MG EC tablet  Commonly known as: PROTONIX   40 mg, Oral, 2 Times Daily      potassium chloride 10 MEQ CR tablet   10 mEq, Oral, Daily             Stop These Medications      hydroCHLOROthiazide 25 MG tablet     lisinopril 10 MG tablet  Commonly known as: PRINIVIL,ZESTRIL     meloxicam 15 MG tablet  Commonly known as: MOBIC              Allergies   Allergen Reactions    Lisinopril-Hydrochlorothiazide Cough       Discharge Disposition:  Home or Self Care      Discharge Diet:  Diet Order   Procedures    Diet: Cardiac; Healthy Heart (2-3 Na+); Fluid Consistency: Thin (IDDSI 0)       Discharge Activity:       CODE STATUS:    Code Status and Medical Interventions: CPR (Attempt to Resuscitate); Full Support   Ordered at: 04/29/25 0157     Code Status (Patient has no pulse and is not breathing):    CPR (Attempt to Resuscitate)     " Medical Interventions (Patient has pulse or is breathing):    Full Support       Future Appointments   Date Time Provider Department Center   7/24/2025 10:15 AM Zehra Shen APRN [facility]      Follow-up Information       Zehra Shen APRN. Schedule an appointment as soon as possible for a visit in 1 week(s).    Specialties: Nurse Practitioner, Family Medicine  Contact information:  [address] [place], [place] [id number] [phone]                             Time Spent on Discharge:  Greater than 30 minutes      Tosha Badillo MD  [facility]  04/30/25  16:42 EDT

## 2025-04-30 NOTE — PROGRESS NOTES
[facility] Clinical Pharmacy Services: Pharmacy Education - Direct Oral Anticoagulant - Xarelto    Jaime Lyle Jr. has been ordered Xarelto for atrial fibrillation.     Counseling points included the following:  Xarelto's indication, patient's need for the medication, and dosing/frequency of this medication.  Enforced the importance of taking their medication as instructed every day and the reason why the medication is dosed that way.  Explained possible side effects of anticoagulation therapy, including increased risk of bleeding, and s/sx of bleeding. Also talked about ways to control bleeding for minor cuts and scrapes.  Emphasized the importance of going to the emergency room if any of the following occur: Falling and hitting your head; noticing bright red blood in urine or dark/tarry stools; vomiting up blood or vomit has a coffee-ground like texture; coughing up blood.  Discussed the importance of informing any physician or dentist that they have been started on a DOAC, in case they need to be taken off for a procedure.  Discussed all important drug interactions, including over-the-counter medications and supplements.  Instructed the patient not to begin or discontinue any medications without informing his/her physician/pharmacist.     I also explained to the patient that this medication may have a high copay associated with it and to let the provider know if it is unaffordable. Patient has a copay card that was provided to him.     Patient expressed understanding and had no further questions.      Angy Shannon, Pharmacy Intern  Clinical Pharmacist

## 2025-04-30 NOTE — OUTREACH NOTE
Prep Survey      Flowsheet Row Responses   [facility] facility patient discharged from? [place]   Is LACE score < 7 ? Yes   Eligibility [facility]   Date of Admission 04/28/25   Date of Discharge 04/30/25   Discharge Disposition Home or Self Care   Discharge diagnosis Atrial fibrillation with RVR   Does the patient have one of the following disease processes/diagnoses(primary or secondary)? Other   Does the patient have Home health ordered? No   Is there a DME ordered? No   Prep survey completed? Yes            SPIKE A - Registered Nurse

## 2025-04-30 NOTE — PLAN OF CARE
Goal Outcome Evaluation:  Plan of Care Reviewed With: patient        Progress: improving  Pt vss, alert orient times 4, Cardizem drip  stopped as per order, started metoprolol, no complain of pain, slept well over night, sr on the monitor, RA, care progressing.

## 2025-04-30 NOTE — CASE MANAGEMENT/SOCIAL WORK
Case Management Discharge Note      Final Note: Pt discharged home.  No needs identified…....Morelia S/[facility]    Provided Post Acute Provider List?: N/A  Provided Post Acute Provider Quality & Resource List?: N/A    Selected Continued Care - Discharged on 4/30/2025 Admission date: 4/28/2025 - Discharge disposition: Home or Self Care      Destination    No services have been selected for the patient.                Durable Medical Equipment    No services have been selected for the patient.                Dialysis/Infusion    No services have been selected for the patient.                Home Medical Care    No services have been selected for the patient.                Therapy    No services have been selected for the patient.                Community Resources    No services have been selected for the patient.                Community & DME    No services have been selected for the patient.                    Transportation Services  Private: Car    Final Discharge Disposition Code: 01 - home or self-care

## 2025-04-30 NOTE — PROGRESS NOTES
Cardiology Progress Note    Patient Identification:  Name: Jaime Lyle Jr.  Age: 54 y.o.  Sex: male  :  1970  MRN: [id number]                 Follow Up / Chief Complaint: Follow up for afib with RVR     Interval History: Sitting up in chair no chest pain or shortness of breath.  Still with a cough but reports that it is significantly improved.  Very hopeful to go home today         Objective:    Past Medical History:  Past Medical History:   Diagnosis Date    Acid reflux     Acute medial meniscus tear     left knee    Community acquired pneumonia     Mild mitral regurgitation     Obesity (BMI 35.0-39.9 without comorbidity)     STEVE (obstructive sleep apnea)     Pulmonic regurgitation     trace    PVC's (premature ventricular contractions)     Tricuspid regurgitation     trace     Past Surgical History:  Past Surgical History:   Procedure Laterality Date    LAPAROSCOPIC GASTRIC BANDING      ROTATOR CUFF REPAIR          Social History:   Social History     Tobacco Use    Smoking status: Light Smoker     Types: Cigars     Passive exposure: Current    Smokeless tobacco: Never    Tobacco comments:     1 cigar monthly   Substance Use Topics    Alcohol use: Yes     Comment: caffeine use      Family History:  Family History   Problem Relation Age of Onset    Heart disease Mother 50    Diabetes Father     Sudden death Maternal Uncle 52    Stroke Maternal Grandmother 50    Diabetes Paternal Grandmother     Diabetes Paternal Grandfather     Hypertension Other     Heart disease Other         ischemic    Diabetes Other           Allergies:  Allergies   Allergen Reactions    Lisinopril-Hydrochlorothiazide Cough     Scheduled Meds:  metoprolol succinate XL, 25 mg, Q24H  pantoprazole, 40 mg, BID  rivaroxaban, 20 mg, Daily With Dinner  sodium chloride, 10 mL, Q12H            INTAKE AND OUTPUT:    Intake/Output Summary (Last 24 hours) at 2025 0463  Last data filed at 2025 1720  Gross per 24 hour   Intake 64.88  ml   Output --   Net 64.88 ml     Review of Systems:   GI: no n/v or abd pain  Cardiac: no chest pain or palpitations  Pulmonary: no shortness of breath + 1 coughing episode last night        Constitutional:  Temp:  [97.5 °F (36.4 °C)-98.3 °F (36.8 °C)] 98.2 °F (36.8 °C)  Heart Rate:  [] 128  Resp:  [16] 16  BP: ()/(65-96) 116/96    Physical Exam:  General:  Alert, cooperative, appears in no acute distress  Respiratory: Clear to auscultation.  Normal respiratory effort and rate.  Cardiovascular: S1S2 Regular rate and rhythm. No murmur, rub or gallop.   Gastrointestinal: soft, non tender. Bowel sounds present.   Extremities: CASTRO x4. No pretibial pitting edema. Adequate musculoskeletal strength.   Neuro: AAO x3 CN II-XII grossly intact              Cardiographics    Echocardiogram:     Interpretation Summary         Left ventricular systolic function is mildly decreased. Left ventricular ejection fraction appears to be 41 - 45%.    Left ventricular diastolic function was indeterminate.     Lab Review   Results from last 7 days   Lab Units 04/28/25 2137 04/28/25 2014   HSTROP T ng/L 37* 41*     Results from last 7 days   Lab Units 04/30/25  0534   MAGNESIUM mg/dL 2.2     Results from last 7 days   Lab Units 04/30/25  0534   SODIUM mmol/L 141   POTASSIUM mmol/L 3.9   BUN mg/dL 19   CREATININE mg/dL 1.00   CALCIUM mg/dL 8.7     @[id number]@  Results from last 7 days   Lab Units 04/30/25  0534 04/29/25  0749 04/28/25 2014   WBC 10*3/mm3 8.43 7.83 12.31*   HEMOGLOBIN g/dL 14.6 14.7 15.9   HEMATOCRIT % 43.6 44.6 48.6   PLATELETS 10*3/mm3 240 229 247     Results from last 7 days   Lab Units 04/28/25 2137   INR  1.26*   APTT seconds 25.4         Assessment/Plan:  Atrial fibrillation with RVR-heart rate overall has improved since admission no longer on Cardizem drip.  While in the room heart rate 110s-120s.  He received metoprolol 25 mg this morning, if BP improved will give additional Toprol 25 mg now and  "increase to 50 mg.  Acute HFrEF: Echo with a EF 41 to 45%, indeterminate LV diastolic function.  Overall he is feeling much better shortness of breath has improved.  He will need GDMT though will increase Toprol at this time for rate control.  He did have a cough on lisinopril.  Will need ischemic workup in the next 1 to 2 weeks as outpatient, I discussed this with the patient.  TSEVE: Encouraged CPAP use  Obesity  Hypokalemia: Replete    He is really wanting to go home today.  Will increase his Toprol and hopefully improve his heart rate control.  Probably okay for home this afternoon  He already has an appointment with Dr. Altamirano on May 6 and prefers to follow-up with him outpatient    )4/30/2025  RIKKI Aguilar/Transcription:   \"Dictated utilizing Dragon dictation\".     "

## 2025-05-01 ENCOUNTER — TRANSITIONAL CARE MANAGEMENT TELEPHONE ENCOUNTER (OUTPATIENT)
Dept: CALL CENTER | Facility: HOSPITAL | Age: 55
End: 2025-05-01
Payer: COMMERCIAL

## 2025-05-01 ENCOUNTER — HOSPITAL ENCOUNTER (OUTPATIENT)
Facility: HOSPITAL | Age: 55
Setting detail: OBSERVATION
Discharge: HOME | End: 2025-05-03
Attending: EMERGENCY MEDICINE | Admitting: EMERGENCY MEDICINE
Payer: COMMERCIAL

## 2025-05-01 ENCOUNTER — APPOINTMENT (OUTPATIENT)
Dept: GENERAL RADIOLOGY | Facility: HOSPITAL | Age: 55
End: 2025-05-01
Payer: COMMERCIAL

## 2025-05-01 DIAGNOSIS — I48.91 ATRIAL FIBRILLATION WITH RVR: Primary | ICD-10-CM

## 2025-05-01 LAB
ALBUMIN SERPL-MCNC: 4 G/DL (ref 3.5–5.2)
ALBUMIN/GLOB SERPL: 1.2 G/DL
ALP SERPL-CCNC: 65 U/L (ref 39–117)
ALT SERPL W P-5'-P-CCNC: 30 U/L (ref 1–41)
ANION GAP SERPL CALCULATED.3IONS-SCNC: 8.9 MMOL/L (ref 5–15)
AST SERPL-CCNC: 23 U/L (ref 1–40)
BASOPHILS # BLD AUTO: 0.04 10*3/MM3 (ref 0–0.2)
BASOPHILS NFR BLD AUTO: 0.3 % (ref 0–1.5)
BILIRUB SERPL-MCNC: 0.7 MG/DL (ref 0–1.2)
BUN SERPL-MCNC: 18 MG/DL (ref 6–20)
BUN/CREAT SERPL: 14.3 (ref 7–25)
CALCIUM SPEC-SCNC: 9.6 MG/DL (ref 8.6–10.5)
CHLORIDE SERPL-SCNC: 103 MMOL/L (ref 98–107)
CO2 SERPL-SCNC: 23.1 MMOL/L (ref 22–29)
CREAT SERPL-MCNC: 1.26 MG/DL (ref 0.76–1.27)
DEPRECATED RDW RBC AUTO: 44.5 FL (ref 37–54)
EGFRCR SERPLBLD CKD-EPI 2021: 66.9 ML/MIN/1.73
EOSINOPHIL # BLD AUTO: 0.19 10*3/MM3 (ref 0–0.4)
EOSINOPHIL NFR BLD AUTO: 1.6 % (ref 0.3–6.2)
ERYTHROCYTE [DISTWIDTH] IN BLOOD BY AUTOMATED COUNT: 13.9 % (ref 12.3–15.4)
GEN 5 1HR TROPONIN T REFLEX: 32 NG/L
GLOBULIN UR ELPH-MCNC: 3.3 GM/DL
GLUCOSE SERPL-MCNC: 98 MG/DL (ref 65–99)
HCT VFR BLD AUTO: 49.7 % (ref 37.5–51)
HGB BLD-MCNC: 15.9 G/DL (ref 13–17.7)
HOLD SPECIMEN: NORMAL
HOLD SPECIMEN: NORMAL
IMM GRANULOCYTES # BLD AUTO: 0.08 10*3/MM3 (ref 0–0.05)
IMM GRANULOCYTES NFR BLD AUTO: 0.7 % (ref 0–0.5)
LYMPHOCYTES # BLD AUTO: 3.78 10*3/MM3 (ref 0.7–3.1)
LYMPHOCYTES NFR BLD AUTO: 31.9 % (ref 19.6–45.3)
MCH RBC QN AUTO: 28 PG (ref 26.6–33)
MCHC RBC AUTO-ENTMCNC: 32 G/DL (ref 31.5–35.7)
MCV RBC AUTO: 87.7 FL (ref 79–97)
MONOCYTES # BLD AUTO: 0.88 10*3/MM3 (ref 0.1–0.9)
MONOCYTES NFR BLD AUTO: 7.4 % (ref 5–12)
NEUTROPHILS NFR BLD AUTO: 58.1 % (ref 42.7–76)
NEUTROPHILS NFR BLD AUTO: 6.88 10*3/MM3 (ref 1.7–7)
NRBC BLD AUTO-RTO: 0 /100 WBC (ref 0–0.2)
NT-PROBNP SERPL-MCNC: 1768 PG/ML (ref 0–900)
PLATELET # BLD AUTO: 288 10*3/MM3 (ref 140–450)
PMV BLD AUTO: 9.9 FL (ref 6–12)
POTASSIUM SERPL-SCNC: 4.1 MMOL/L (ref 3.5–5.2)
PROT SERPL-MCNC: 7.3 G/DL (ref 6–8.5)
RBC # BLD AUTO: 5.67 10*6/MM3 (ref 4.14–5.8)
SODIUM SERPL-SCNC: 135 MMOL/L (ref 136–145)
TROPONIN T % DELTA: 14
TROPONIN T NUMERIC DELTA: 4 NG/L
TROPONIN T SERPL HS-MCNC: 28 NG/L
WBC NRBC COR # BLD AUTO: 11.85 10*3/MM3 (ref 3.4–10.8)
WHOLE BLOOD HOLD COAG: NORMAL
WHOLE BLOOD HOLD SPECIMEN: NORMAL

## 2025-05-01 PROCEDURE — 96376 TX/PRO/DX INJ SAME DRUG ADON: CPT

## 2025-05-01 PROCEDURE — 80053 COMPREHEN METABOLIC PANEL: CPT | Performed by: EMERGENCY MEDICINE

## 2025-05-01 PROCEDURE — 93005 ELECTROCARDIOGRAM TRACING: CPT | Performed by: EMERGENCY MEDICINE

## 2025-05-01 PROCEDURE — G0378 HOSPITAL OBSERVATION PER HR: HCPCS

## 2025-05-01 PROCEDURE — 96374 THER/PROPH/DIAG INJ IV PUSH: CPT

## 2025-05-01 PROCEDURE — 99285 EMERGENCY DEPT VISIT HI MDM: CPT

## 2025-05-01 PROCEDURE — 85025 COMPLETE CBC W/AUTO DIFF WBC: CPT | Performed by: EMERGENCY MEDICINE

## 2025-05-01 PROCEDURE — 96375 TX/PRO/DX INJ NEW DRUG ADDON: CPT

## 2025-05-01 PROCEDURE — 71045 X-RAY EXAM CHEST 1 VIEW: CPT

## 2025-05-01 PROCEDURE — 84484 ASSAY OF TROPONIN QUANT: CPT | Performed by: EMERGENCY MEDICINE

## 2025-05-01 PROCEDURE — 93010 ELECTROCARDIOGRAM REPORT: CPT | Performed by: INTERNAL MEDICINE

## 2025-05-01 PROCEDURE — 36415 COLL VENOUS BLD VENIPUNCTURE: CPT

## 2025-05-01 PROCEDURE — 93005 ELECTROCARDIOGRAM TRACING: CPT

## 2025-05-01 PROCEDURE — 25010000002 DIGOXIN PER 500 MCG: Performed by: EMERGENCY MEDICINE

## 2025-05-01 RX ORDER — BISACODYL 5 MG/1
5 TABLET, DELAYED RELEASE ORAL DAILY PRN
Status: DISCONTINUED | OUTPATIENT
Start: 2025-05-01 | End: 2025-05-03 | Stop reason: HOSPADM

## 2025-05-01 RX ORDER — AMOXICILLIN 250 MG
2 CAPSULE ORAL 2 TIMES DAILY
Status: DISCONTINUED | OUTPATIENT
Start: 2025-05-01 | End: 2025-05-03 | Stop reason: HOSPADM

## 2025-05-01 RX ORDER — NITROGLYCERIN 0.4 MG/1
0.4 TABLET SUBLINGUAL
Status: DISCONTINUED | OUTPATIENT
Start: 2025-05-01 | End: 2025-05-03 | Stop reason: HOSPADM

## 2025-05-01 RX ORDER — POLYETHYLENE GLYCOL 3350 17 G/17G
17 POWDER, FOR SOLUTION ORAL DAILY PRN
Status: DISCONTINUED | OUTPATIENT
Start: 2025-05-01 | End: 2025-05-03 | Stop reason: HOSPADM

## 2025-05-01 RX ORDER — ASPIRIN 325 MG
325 TABLET ORAL ONCE
Status: COMPLETED | OUTPATIENT
Start: 2025-05-01 | End: 2025-05-01

## 2025-05-01 RX ORDER — DIGOXIN 0.25 MG/ML
500 INJECTION INTRAMUSCULAR; INTRAVENOUS ONCE
Status: COMPLETED | OUTPATIENT
Start: 2025-05-01 | End: 2025-05-01

## 2025-05-01 RX ORDER — ALBUTEROL SULFATE 90 UG/1
2 INHALANT RESPIRATORY (INHALATION) EVERY 4 HOURS PRN
Qty: 8 G | Refills: 11 | OUTPATIENT
Start: 2025-05-01

## 2025-05-01 RX ORDER — SODIUM CHLORIDE 0.9 % (FLUSH) 0.9 %
10 SYRINGE (ML) INJECTION AS NEEDED
Status: DISCONTINUED | OUTPATIENT
Start: 2025-05-01 | End: 2025-05-03 | Stop reason: HOSPADM

## 2025-05-01 RX ORDER — SODIUM CHLORIDE 9 MG/ML
40 INJECTION, SOLUTION INTRAVENOUS AS NEEDED
Status: DISCONTINUED | OUTPATIENT
Start: 2025-05-01 | End: 2025-05-03 | Stop reason: HOSPADM

## 2025-05-01 RX ORDER — SODIUM CHLORIDE 0.9 % (FLUSH) 0.9 %
10 SYRINGE (ML) INJECTION EVERY 12 HOURS SCHEDULED
Status: DISCONTINUED | OUTPATIENT
Start: 2025-05-01 | End: 2025-05-03 | Stop reason: HOSPADM

## 2025-05-01 RX ORDER — BISACODYL 10 MG
10 SUPPOSITORY, RECTAL RECTAL DAILY PRN
Status: DISCONTINUED | OUTPATIENT
Start: 2025-05-01 | End: 2025-05-03 | Stop reason: HOSPADM

## 2025-05-01 RX ADMIN — DIGOXIN 500 MCG: 0.25 INJECTION INTRAMUSCULAR; INTRAVENOUS at 22:38

## 2025-05-01 RX ADMIN — METOROPROLOL TARTRATE 5 MG: 5 INJECTION, SOLUTION INTRAVENOUS at 21:49

## 2025-05-01 RX ADMIN — METOROPROLOL TARTRATE 5 MG: 5 INJECTION, SOLUTION INTRAVENOUS at 22:12

## 2025-05-01 RX ADMIN — METOROPROLOL TARTRATE 5 MG: 5 INJECTION, SOLUTION INTRAVENOUS at 22:25

## 2025-05-01 RX ADMIN — ASPIRIN 325 MG ORAL TABLET 325 MG: 325 PILL ORAL at 21:39

## 2025-05-01 NOTE — OUTREACH NOTE
Call Center TCM Note      Flowsheet Row Responses   [facility] patient discharged from? [place]   Does the patient have one of the following disease processes/diagnoses(primary or secondary)? Other   TCM attempt successful? Yes   Call start time 1523   Call end time 1527   Discharge diagnosis Atrial fibrillation with RVR   Meds reviewed with patient/caregiver? Yes   Is the patient having any side effects they believe may be caused by any medication additions or changes? No   Does the patient have all medications ordered at discharge? Yes   Is the patient taking all medications as directed (includes completed medication regime)? Yes   Comments Hospital follow up with PCP 5/7.   Does the patient have an appointment with their PCP within 7-14 days of discharge? Yes   Psychosocial issues? No   Did the patient receive a copy of their discharge instructions? Yes   Nursing interventions Reviewed instructions with patient   What is the patient's perception of their health status since discharge? Improving   Is the patient/caregiver able to teach back signs and symptoms related to disease process for when to call PCP? Yes   Is the patient/caregiver able to teach back signs and symptoms related to disease process for when to call 911? Yes   Is the patient/caregiver able to teach back the hierarchy of who to call/visit for symptoms/problems? PCP, Specialist, Home health nurse, Urgent Care, ED, 911 Yes   TCM call completed? Yes   Wrap up additional comments Patient reports doing okay. Patient will see cardiologist through [facility] and PCP next week.   Call end time 1527   Would this patient benefit from a Referral to Amb Social Work? No   Is the patient interested in additional calls from an ambulatory ? No            TORY Marinelli Registered Nurse    5/1/2025, 15:27 EDT

## 2025-05-02 ENCOUNTER — HOSPITAL ENCOUNTER (OUTPATIENT)
Dept: CARDIOLOGY | Facility: HOSPITAL | Age: 55
Setting detail: OBSERVATION
Discharge: HOME | End: 2025-05-02
Payer: COMMERCIAL

## 2025-05-02 DIAGNOSIS — R07.9 CHEST PAIN, UNSPECIFIED TYPE: ICD-10-CM

## 2025-05-02 DIAGNOSIS — R07.9 CHEST PAIN, UNSPECIFIED TYPE: Primary | ICD-10-CM

## 2025-05-02 LAB
ANION GAP SERPL CALCULATED.3IONS-SCNC: 13 MMOL/L (ref 5–15)
BH CV NUCLEAR PRIOR STUDY: 2
BH CV STRESS BP STAGE 1: NORMAL
BH CV STRESS COMMENTS STAGE 1: NORMAL
BH CV STRESS DOSE REGADENOSON STAGE 1: 0.4
BH CV STRESS DURATION MIN STAGE 1: 0
BH CV STRESS DURATION SEC STAGE 1: 10
BH CV STRESS HR STAGE 1: 148
BH CV STRESS NUCLEAR ISOTOPE DOSE: 41.7 MCI
BH CV STRESS PROTOCOL 1: NORMAL
BH CV STRESS RECOVERY BP: NORMAL MMHG
BH CV STRESS RECOVERY HR: 138 BPM
BH CV STRESS STAGE 1: 1
BUN SERPL-MCNC: 20 MG/DL (ref 6–20)
BUN/CREAT SERPL: 18.2 (ref 7–25)
CALCIUM SPEC-SCNC: 9 MG/DL (ref 8.6–10.5)
CHLORIDE SERPL-SCNC: 106 MMOL/L (ref 98–107)
CO2 SERPL-SCNC: 22 MMOL/L (ref 22–29)
CREAT SERPL-MCNC: 1.1 MG/DL (ref 0.76–1.27)
DEPRECATED RDW RBC AUTO: 44.8 FL (ref 37–54)
DIGOXIN SERPL-MCNC: 0.8 NG/ML (ref 0.6–1.2)
EGFRCR SERPLBLD CKD-EPI 2021: 78.8 ML/MIN/1.73
ERYTHROCYTE [DISTWIDTH] IN BLOOD BY AUTOMATED COUNT: 14 % (ref 12.3–15.4)
GLUCOSE SERPL-MCNC: 99 MG/DL (ref 65–99)
HCT VFR BLD AUTO: 48.3 % (ref 37.5–51)
HGB BLD-MCNC: 15.3 G/DL (ref 13–17.7)
MAGNESIUM SERPL-MCNC: 2.3 MG/DL (ref 1.6–2.6)
MAXIMAL PREDICTED HEART RATE: 164 BPM
MCH RBC QN AUTO: 27.8 PG (ref 26.6–33)
MCHC RBC AUTO-ENTMCNC: 31.7 G/DL (ref 31.5–35.7)
MCV RBC AUTO: 87.7 FL (ref 79–97)
PERCENT MAX PREDICTED HR: 90.24 %
PLATELET # BLD AUTO: 226 10*3/MM3 (ref 140–450)
PMV BLD AUTO: 9.4 FL (ref 6–12)
POTASSIUM SERPL-SCNC: 4.2 MMOL/L (ref 3.5–5.2)
QT INTERVAL: 322 MS
QT INTERVAL: 363 MS
QT INTERVAL: 400 MS
QTC INTERVAL: 484 MS
QTC INTERVAL: 485 MS
QTC INTERVAL: 487 MS
RBC # BLD AUTO: 5.51 10*6/MM3 (ref 4.14–5.8)
SODIUM SERPL-SCNC: 141 MMOL/L (ref 136–145)
SPECT HRT GATED+EF W RNC IV: 12 %
STRESS BASELINE BP: NORMAL MMHG
STRESS BASELINE HR: 129 BPM
STRESS PERCENT HR: 106 %
STRESS POST EXERCISE DUR SEC: 10 SEC
STRESS POST PEAK BP: NORMAL MMHG
STRESS POST PEAK HR: 148 BPM
STRESS TARGET HR: 139 BPM
TROPONIN T SERPL HS-MCNC: 34 NG/L
WBC NRBC COR # BLD AUTO: 9.42 10*3/MM3 (ref 3.4–10.8)

## 2025-05-02 PROCEDURE — 93458 L HRT ARTERY/VENTRICLE ANGIO: CPT | Performed by: INTERNAL MEDICINE

## 2025-05-02 PROCEDURE — G0378 HOSPITAL OBSERVATION PER HR: HCPCS

## 2025-05-02 PROCEDURE — 93010 ELECTROCARDIOGRAM REPORT: CPT | Performed by: INTERNAL MEDICINE

## 2025-05-02 PROCEDURE — 25010000002 FENTANYL CITRATE (PF) 50 MCG/ML SOLUTION: Performed by: INTERNAL MEDICINE

## 2025-05-02 PROCEDURE — 93017 CV STRESS TEST TRACING ONLY: CPT

## 2025-05-02 PROCEDURE — 25010000002 MIDAZOLAM PER 1 MG: Performed by: INTERNAL MEDICINE

## 2025-05-02 PROCEDURE — 34310000005 TECHNETIUM TETROFOSMIN KIT: Performed by: INTERNAL MEDICINE

## 2025-05-02 PROCEDURE — 25010000002 REGADENOSON 0.4 MG/5ML SOLUTION: Performed by: INTERNAL MEDICINE

## 2025-05-02 PROCEDURE — 83735 ASSAY OF MAGNESIUM: CPT

## 2025-05-02 PROCEDURE — A9502 TC99M TETROFOSMIN: HCPCS | Performed by: INTERNAL MEDICINE

## 2025-05-02 PROCEDURE — 93005 ELECTROCARDIOGRAM TRACING: CPT

## 2025-05-02 PROCEDURE — 78451 HT MUSCLE IMAGE SPECT SING: CPT

## 2025-05-02 PROCEDURE — 99215 OFFICE O/P EST HI 40 MIN: CPT

## 2025-05-02 PROCEDURE — 80048 BASIC METABOLIC PNL TOTAL CA: CPT

## 2025-05-02 PROCEDURE — 25510000001 IOPAMIDOL PER 1 ML: Performed by: INTERNAL MEDICINE

## 2025-05-02 PROCEDURE — 80162 ASSAY OF DIGOXIN TOTAL: CPT

## 2025-05-02 PROCEDURE — 84484 ASSAY OF TROPONIN QUANT: CPT

## 2025-05-02 PROCEDURE — 85027 COMPLETE CBC AUTOMATED: CPT

## 2025-05-02 PROCEDURE — 25010000002 LIDOCAINE 2% SOLUTION: Performed by: INTERNAL MEDICINE

## 2025-05-02 PROCEDURE — C1769 GUIDE WIRE: HCPCS | Performed by: INTERNAL MEDICINE

## 2025-05-02 PROCEDURE — 99215 OFFICE O/P EST HI 40 MIN: CPT | Performed by: INTERNAL MEDICINE

## 2025-05-02 PROCEDURE — 25010000002 HEPARIN (PORCINE) PER 1000 UNITS: Performed by: INTERNAL MEDICINE

## 2025-05-02 PROCEDURE — C1894 INTRO/SHEATH, NON-LASER: HCPCS | Performed by: INTERNAL MEDICINE

## 2025-05-02 PROCEDURE — 78452 HT MUSCLE IMAGE SPECT MULT: CPT

## 2025-05-02 RX ORDER — ONDANSETRON 2 MG/ML
4 INJECTION INTRAMUSCULAR; INTRAVENOUS EVERY 6 HOURS PRN
Status: DISCONTINUED | OUTPATIENT
Start: 2025-05-02 | End: 2025-05-03 | Stop reason: HOSPADM

## 2025-05-02 RX ORDER — DIGOXIN 125 MCG
125 TABLET ORAL
Status: DISCONTINUED | OUTPATIENT
Start: 2025-05-02 | End: 2025-05-02

## 2025-05-02 RX ORDER — MIDAZOLAM HYDROCHLORIDE 1 MG/ML
INJECTION, SOLUTION INTRAMUSCULAR; INTRAVENOUS
Status: DISCONTINUED | OUTPATIENT
Start: 2025-05-02 | End: 2025-05-02 | Stop reason: HOSPADM

## 2025-05-02 RX ORDER — METOPROLOL SUCCINATE 25 MG/1
37.5 TABLET, EXTENDED RELEASE ORAL
Status: DISCONTINUED | OUTPATIENT
Start: 2025-05-02 | End: 2025-05-02

## 2025-05-02 RX ORDER — PANTOPRAZOLE SODIUM 40 MG/1
40 TABLET, DELAYED RELEASE ORAL 2 TIMES DAILY
Status: DISCONTINUED | OUTPATIENT
Start: 2025-05-02 | End: 2025-05-03 | Stop reason: HOSPADM

## 2025-05-02 RX ORDER — HEPARIN SODIUM 1000 [USP'U]/ML
INJECTION, SOLUTION INTRAVENOUS; SUBCUTANEOUS
Status: DISCONTINUED | OUTPATIENT
Start: 2025-05-02 | End: 2025-05-02 | Stop reason: HOSPADM

## 2025-05-02 RX ORDER — IOPAMIDOL 755 MG/ML
INJECTION, SOLUTION INTRAVASCULAR
Status: DISCONTINUED | OUTPATIENT
Start: 2025-05-02 | End: 2025-05-02 | Stop reason: HOSPADM

## 2025-05-02 RX ORDER — LIDOCAINE HYDROCHLORIDE 20 MG/ML
INJECTION, SOLUTION INFILTRATION; PERINEURAL
Status: DISCONTINUED | OUTPATIENT
Start: 2025-05-02 | End: 2025-05-02 | Stop reason: HOSPADM

## 2025-05-02 RX ORDER — HYDROCODONE BITARTRATE AND ACETAMINOPHEN 5; 325 MG/1; MG/1
1 TABLET ORAL EVERY 4 HOURS PRN
Status: DISCONTINUED | OUTPATIENT
Start: 2025-05-02 | End: 2025-05-03 | Stop reason: HOSPADM

## 2025-05-02 RX ORDER — FUROSEMIDE 40 MG/1
20 TABLET ORAL DAILY
Status: DISCONTINUED | OUTPATIENT
Start: 2025-05-02 | End: 2025-05-03 | Stop reason: HOSPADM

## 2025-05-02 RX ORDER — REGADENOSON 0.08 MG/ML
0.4 INJECTION, SOLUTION INTRAVENOUS
Status: COMPLETED | OUTPATIENT
Start: 2025-05-02 | End: 2025-05-02

## 2025-05-02 RX ORDER — FENTANYL CITRATE 50 UG/ML
INJECTION, SOLUTION INTRAMUSCULAR; INTRAVENOUS
Status: DISCONTINUED | OUTPATIENT
Start: 2025-05-02 | End: 2025-05-02 | Stop reason: HOSPADM

## 2025-05-02 RX ORDER — ACETAMINOPHEN 325 MG/1
650 TABLET ORAL EVERY 4 HOURS PRN
Status: DISCONTINUED | OUTPATIENT
Start: 2025-05-02 | End: 2025-05-03 | Stop reason: HOSPADM

## 2025-05-02 RX ORDER — VERAPAMIL HYDROCHLORIDE 2.5 MG/ML
INJECTION INTRAVENOUS
Status: DISCONTINUED | OUTPATIENT
Start: 2025-05-02 | End: 2025-05-02 | Stop reason: HOSPADM

## 2025-05-02 RX ORDER — SODIUM CHLORIDE 9 MG/ML
INJECTION, SOLUTION INTRAVENOUS
Status: DISCONTINUED | OUTPATIENT
Start: 2025-05-02 | End: 2025-05-02

## 2025-05-02 RX ORDER — DIGOXIN 0.25 MG/ML
250 INJECTION INTRAMUSCULAR; INTRAVENOUS ONCE
Status: DISCONTINUED | OUTPATIENT
Start: 2025-05-02 | End: 2025-05-03 | Stop reason: HOSPADM

## 2025-05-02 RX ORDER — ATENOLOL 25 MG/1
50 TABLET ORAL EVERY 12 HOURS SCHEDULED
Status: DISCONTINUED | OUTPATIENT
Start: 2025-05-02 | End: 2025-05-03 | Stop reason: HOSPADM

## 2025-05-02 RX ORDER — DIGOXIN 125 MCG
250 TABLET ORAL
Status: DISCONTINUED | OUTPATIENT
Start: 2025-05-03 | End: 2025-05-03 | Stop reason: HOSPADM

## 2025-05-02 RX ORDER — POTASSIUM CHLORIDE 750 MG/1
10 TABLET, EXTENDED RELEASE ORAL DAILY
Status: DISCONTINUED | OUTPATIENT
Start: 2025-05-02 | End: 2025-05-03 | Stop reason: HOSPADM

## 2025-05-02 RX ORDER — ONDANSETRON 4 MG/1
4 TABLET, ORALLY DISINTEGRATING ORAL EVERY 6 HOURS PRN
Status: DISCONTINUED | OUTPATIENT
Start: 2025-05-02 | End: 2025-05-03 | Stop reason: HOSPADM

## 2025-05-02 RX ADMIN — Medication 10 ML: at 00:00

## 2025-05-02 RX ADMIN — REGADENOSON 0.4 MG: 0.08 INJECTION, SOLUTION INTRAVENOUS at 12:48

## 2025-05-02 RX ADMIN — PANTOPRAZOLE SODIUM 40 MG: 40 TABLET, DELAYED RELEASE ORAL at 20:43

## 2025-05-02 RX ADMIN — Medication 10 ML: at 09:28

## 2025-05-02 RX ADMIN — TETROFOSMIN 1 DOSE: 1.38 INJECTION, POWDER, LYOPHILIZED, FOR SOLUTION INTRAVENOUS at 12:48

## 2025-05-02 RX ADMIN — ATENOLOL 50 MG: 25 TABLET ORAL at 20:43

## 2025-05-02 RX ADMIN — METOPROLOL SUCCINATE 37.5 MG: 25 TABLET, EXTENDED RELEASE ORAL at 09:55

## 2025-05-02 RX ADMIN — Medication 10 ML: at 20:43

## 2025-05-02 RX ADMIN — PANTOPRAZOLE SODIUM 40 MG: 40 TABLET, DELAYED RELEASE ORAL at 09:28

## 2025-05-02 RX ADMIN — FUROSEMIDE 20 MG: 40 TABLET ORAL at 09:27

## 2025-05-02 RX ADMIN — POTASSIUM CHLORIDE 10 MEQ: 750 TABLET, EXTENDED RELEASE ORAL at 09:27

## 2025-05-02 NOTE — ED NOTES
Pt to ED c/o chest pain x2 hours that spreads across his chest - no N/V, no radiation. Pt has hx of afib, is anticoagulated.

## 2025-05-02 NOTE — CONSULTS
[facility]  Consult Note                                                                              5/2/2025  Shady Arrington MD    Patient Identification:  Jaime Lyle Jr.:   54 y.o.  male  1970     Date of Admission:5/1/2025    CC: Atrial fibrillation with RVR     History of Present Illness:  Mr. Lyle is a 54 year old male with history of obstructive sleep reflux disease, obesity, chronic PVCs (negative stress tests in 2017 and 2020 has been seen by Dr. Oconnell) and hypertension (recently started on Lasix lisinopril hydrochlorothiazide prior to last admission). He was admitted on 4/28/2025 with upper respiratory infection systolic heart failure and atrial fibrillation with rapid ventricular rate.  Echo had shown ejection fraction of 40 to 45% with no significant valvular heart disease.  Left atrial size was normal.  He was dismissed on metoprolol and Xarelto.  Lisinopril was switched to losartan as it was locked up because possible cause.  Hydrochlorothiazide was discontinued due to borderline low blood pressure.   He states he was starting to feel well but then presented to the ED on 5/2/25 reporting a fib with RVR with a rate in the 130s.  He states he had actually walked in the morning without difficulty.  Just getting ready to walk again in the afternoon and he felt unwell with palpitations and shortness of breath.  He describes having bilateral lateral discomfort lower chest, upper abdomen.  He has not had this at other times whatsoever.  The tachycardia persisted prompting him to come to the emergency room as previously instructed at dismissal.  He denies any increased caffeine intake stress or precipitating events.  He was treated in the emergency room with 2 doses of IV lopressor and IVdigoxin.  Overnight rates have improved blood pressure remains soft.  Troponin elevated but flat and similar to prior admission.  Lab values include troponin 32/34, unremarkable CBC and  CMP    Overnight rates of been normal and he remains in atrial fibrillation.  He had 1 5 beat run of ventricular tachycardia at 3:58 AM.  He is currently resting quietly in atrial fibrillation rates controlled    Echocardiogram 4/30/25          Past Medical History:  Past Medical History:   Diagnosis Date    Acid reflux     Acute medial meniscus tear     left knee    Community acquired pneumonia     Mild mitral regurgitation     Obesity (BMI 35.0-39.9 without comorbidity)     STEVE (obstructive sleep apnea)     Pulmonic regurgitation     trace    PVC's (premature ventricular contractions)     Tricuspid regurgitation     trace       Past Surgical History:  Past Surgical History:   Procedure Laterality Date    LAPAROSCOPIC GASTRIC BANDING      ROTATOR CUFF REPAIR         Allergies:  Allergies   Allergen Reactions    Lisinopril-Hydrochlorothiazide Cough       Home Meds:  Medications Prior to Admission   Medication Sig Dispense Refill Last Dose/Taking    furosemide (LASIX) 20 MG tablet Take 1 tablet by mouth Daily for 30 days. 30 tablet 0 5/1/2025 Morning    metoprolol succinate XL (TOPROL-XL) 25 MG 24 hr tablet Take 1.5 tablets by mouth Daily for 30 days. 45 tablet 0 5/1/2025 Morning    pantoprazole (PROTONIX) 40 MG EC tablet TAKE 1 TABLET BY MOUTH 2 TIMES A  tablet 0 5/1/2025 Bedtime    potassium chloride 10 MEQ CR tablet Take 1 tablet by mouth Daily. 30 tablet 2 5/1/2025 Morning    rivaroxaban (XARELTO) 20 MG tablet Take 1 tablet by mouth Daily With Dinner for 30 days. Indications: Atrial Fibrillation 30 tablet 0 5/1/2025 Evening    albuterol sulfate  (90 Base) MCG/ACT inhaler Inhale 2 puffs Every 4 (Four) Hours As Needed for Wheezing. 8 g 11 Unknown       Current Meds  Scheduled Meds:furosemide, 20 mg, Oral, Daily  metoprolol succinate XL, 37.5 mg, Oral, Q24H  pantoprazole, 40 mg, Oral, BID  potassium chloride, 10 mEq, Oral, Daily  rivaroxaban, 20 mg, Oral, Daily With Dinner  senna-docusate sodium, 2  "tablet, Oral, BID  sodium chloride, 10 mL, Intravenous, Q12H      Social History     Socioeconomic History    Marital status:    Tobacco Use    Smoking status: Light Smoker     Types: Cigars     Passive exposure: Current    Smokeless tobacco: Never    Tobacco comments:     1 cigar monthly   Vaping Use    Vaping status: Never Used   Substance and Sexual Activity    Alcohol use: Yes     Comment: caffeine use    Drug use: Never    Sexual activity: Defer       Family History:  Family History   Problem Relation Age of Onset    Heart disease Mother 50    Diabetes Father     Sudden death Maternal Uncle 52    Stroke Maternal Grandmother 50    Diabetes Paternal Grandmother     Diabetes Paternal Grandfather     Hypertension Other     Heart disease Other         ischemic    Diabetes Other        REVIEW OF SYSTEMS:   CONSTITUTIONAL: No weight loss, fever, chills  HEENT: Eyes: No visual loss, blurred vision, double vision or yellow sclerae.  RESPIRATORY: No hemoptysis, cough or sputum.   GASTROINTESTINAL: No anorexia, nausea, vomiting or diarrhea. No abdominal pain, bright red blood per rectum or melena.  GENITOURINARY: No burning on urination, hematuria or increased frequency.  NEUROLOGICAL: No headache, paralysis, ataxia, numbness or tingling in the extremities. No change in bowel or bladder control.   MUSCULOSKELETAL: No muscle, back pain, joint pain or stiffness.   HEMATOLOGIC: No anemia, bleeding or bruising.   LYMPHATICS: No enlarged nodes. No history of splenectomy.   ENDOCRINOLOGIC: No reports of sweating, cold or heat intolerance. No polyuria or polydipsia.     Physical Exam    /82 (BP Location: Right arm, Patient Position: Lying)   Pulse 88   Temp 97.1 °F (36.2 °C)   Resp 16   Ht 182.9 cm (72\")   Wt (!) 145 kg (318 lb 12.8 oz)   SpO2 96%   BMI 43.24 kg/m²     General Appearance Well developed, cooperative and well nourished and no acute distress   Head Normocephalic, without abnormality, atraumatic "   Ears Ears appear intact with no abnormalities noted   Throat No oral lesions, no thrush, oral mucosa moist   Neck No adenopathy, supple, trachea midline, no thyromegaly, no carotid bruit, no JVD   Back No skin lesions, erythema or scars, no tenderness to percussion or palpation,range of motion is normal   Lungs Clear to auscultation,respirations regular, even and unlabored   Heart Irregular rhythm and normal rate, normal S1 and S2, no murmur, no gallop, no rub, no click   Chest wall No abnormalities observed   Abdomen Normal bowel sounds, no masses, no hepatomegaly,    Extremities Moves all extremities well, no edema, no cyanosis, no redness   Pulses Pulses palpable and equal bilaterally. Normal radial, carotid, femoral, dorsalis pedis and posterior tibial pulses bilaterally.    Skin No bleeding, bruising or rash   Psychiatric Alert and oriented x 3, normal mood and affect     Results from last 7 days   Lab Units 05/02/25 0446 05/01/25  2140   SODIUM mmol/L 141 135*   POTASSIUM mmol/L 4.2 4.1   CHLORIDE mmol/L 106 103   CO2 mmol/L 22.0 23.1   BUN mg/dL 20 18   CREATININE mg/dL 1.10 1.26   CALCIUM mg/dL 9.0 9.6   BILIRUBIN mg/dL  --  0.7   ALK PHOS U/L  --  65   ALT (SGPT) U/L  --  30   AST (SGOT) U/L  --  23   GLUCOSE mg/dL 99 98     Results from last 7 days   Lab Units 05/02/25 0446 05/01/25  2242 05/01/25  2140   HSTROP T ng/L 34* 32* 28*     Results from last 7 days   Lab Units 05/02/25 0446 05/01/25 2140 04/30/25  0534   WBC 10*3/mm3 9.42 11.85* 8.43   HEMOGLOBIN g/dL 15.3 15.9 14.6   HEMATOCRIT % 48.3 49.7 43.6   PLATELETS 10*3/mm3 226 288 240     Results from last 7 days   Lab Units 04/28/25  2137   INR  1.26*   APTT seconds 25.4     Results from last 7 days   Lab Units 05/02/25 0446   MAGNESIUM mg/dL 2.3     Results from last 7 days   Lab Units 04/30/25  0534 04/28/25 2014   PROBNP pg/mL 1,768.0* 2,059.0*     Results from last 7 days   Lab Units 04/28/25 2014   TSH uIU/mL 2.820       I personally  viewed and interpreted the patient's EKG/Telemetry data                I have reviewed HPI and ROS above.    Assessment and Plan  1.  Recurrent atrial fibrillation with rapid rates.  Continue Toprol current regimen and add low-dose digoxin.  Will ask EP service to review.  Of note most recent echo showed normal left atrial size.  He had already been on Xarelto which has been continued.  Will ask EP service to review as he also notes he has had intermittent bradycardia in the past with rates in the 40s and 50s.  However this is also in the setting of frequent PVCs and I suspect some of this was due to not on conducted PVCs which she is fairly frequently.  2.  Nonsustained ventricular tachycardia history of frequent PVCs that improved with exercise.  However last stress test was 5 years.  Will check Lexiscan Cardiolite stress test in the setting of elevated troponin and cardiomyopath.  If negative it is likely the cardiomyopathy is attributed to atrial fibrillation rapid rate  3.  Acute systolic heart failure.  Noted just within the past week felt to be due to atrial fibrillation.  Will check for ischemia as above.  Blood pressure too low to add ARB's.  Intolerant of ACE inhibitor  4.  Obstructive sleep apnea, on CPAP  5.  Recent upper respiratory infection  6.  Obese  7.  Chronic PVCs, likely due to sleep apnea    Mini Conti  5/2/2025  08:11 EDT    60min spent in reviewing records, discussion and examination of the patient and discussion with other members of the patient's medical team.     Dictated utilizing Dragon dictation

## 2025-05-02 NOTE — PROGRESS NOTES
Pt admitted to the observation unit from the emergency department with complaints of A-fib with RVR, had rate control in the ER after getting digoxin.  Has been felt fine overnight with no recurrence of tightness, palpitations, or shortness of breath.  Just recently diagnosed with A-fib earlier this week and has been on anticoagulation for just a couple days.     On exam,   General: No acute distress, nontoxic  HEENT: EOMI  Pulm: Symmetric chest rise, nonlabored breathing  CV: Regular rate and rhythm  GI: Nondistended  MSK: No deformity  Skin: Warm, dry  Neuro: Awake, alert, oriented x 4, moving all extremities, no focal deficits  Psych: Calm, cooperative    Vital signs and nursing notes reviewed.           Plan: Rate control stable through the night, asymptomatic this morning, awaiting cardiology to evaluate but hopeful for discharge today.  Does have a cardiology appointment next week already scheduled.  Troponins have been stable through the last week, 41 approximately 4 days ago, was 28, 32, 34 through this hospitalization.  Well-appearing in no acute distress, all questions and concerns addressed.       MD Attestation Note    SHARED VISIT: This visit was performed by BOTH a physician and an APC. The substantive portion of the medical decision making was performed by this attesting physician who made or approved the management plan and takes responsibility for patient management. All studies in the APC note (if performed) were independently interpreted by me.

## 2025-05-02 NOTE — CONSULTS
Electrophysiology Hospital Consult            Patient Name: Jaime Lyle Jr.  Age/Sex: 54 y.o. male  : 1970  MRN: [id number]    Date of Admission: 2025  Date of Encounter Visit: 25  Encounter Provider: RIKKI Sanderson  Referring Provider: Shady Arrington MD  Place of Service: [facility] CARDIOLOGY  Patient Care Team:  Zehra hSen APRN as PCP - General (Nurse Practitioner)  Chandra Nicholson MD as Consulting Physician (Cardiology)      Subjective:   EP Consultation for: Atrial fibrillation    Chief Complaint: Chest pain    History of Present Illness:  Jaime Lyle Jr. 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  He followed with Dr. Oconnell and was last seen in 2018.  He has history of PVCs.    He is diagnosed with PVCs in  underwent normal stress and echo at that time.    He was last seen in the office in 2018.      He presented to the ED on 2025 with complaints of chest pain.  He was noted to be in atrial fibrillation with RVR which was new for him.  He was given IV digoxin with improvement in his heart rate.    He was started on rivaroxaban and he was discharged.      He presented back to the ED last night with complaints of chest pain.  He was still in atrial fibrillation with RVR.    He was admitted in observation and.  Dr. Conti saw him.  Plans for stress test this morning.    EP has been asked to evaluate for atrial fibrillation with RVR.      I saw the patient this morning.    He says that he was working and told his coworker that he did not feel right.  He had some tightness in his chest.    They hooked him up to an EKG machine and noted A-fib with RVR and advised him to go to the ED.    He says that when he was in the ED and his rate was controlled.  He felt significantly better however when he has periods of RVR.  He notes chest pain and palpitations.    His heart rate is 90s to 100s and he feels better  currently.    There was some concern that he had had significant bradycardia in the past.  He does have history of PVCs.  I cannot find any EKG with a heart rate below 59.  I suspect some of this is pseudobradycardia.    He is currently on metoprolol.  He was given some IV digoxin but this was discontinued this afternoon.    He has only been on anticoagulation for 3 days.    His LVEF is about 45%.    Past Medical History:  Past Medical History:   Diagnosis Date    Acid reflux     Acute medial meniscus tear     left knee    Community acquired pneumonia     Mild mitral regurgitation     Obesity (BMI 35.0-39.9 without comorbidity)     STEVE (obstructive sleep apnea)     Pulmonic regurgitation     trace    PVC's (premature ventricular contractions)     Tricuspid regurgitation     trace       Past Surgical History:   Procedure Laterality Date    LAPAROSCOPIC GASTRIC BANDING      ROTATOR CUFF REPAIR         Home Medications:   Medications Prior to Admission   Medication Sig Dispense Refill Last Dose/Taking    furosemide (LASIX) 20 MG tablet Take 1 tablet by mouth Daily for 30 days. 30 tablet 0 5/1/2025 Morning    metoprolol succinate XL (TOPROL-XL) 25 MG 24 hr tablet Take 1.5 tablets by mouth Daily for 30 days. 45 tablet 0 5/1/2025 Morning    pantoprazole (PROTONIX) 40 MG EC tablet TAKE 1 TABLET BY MOUTH 2 TIMES A  tablet 0 5/1/2025 Bedtime    potassium chloride 10 MEQ CR tablet Take 1 tablet by mouth Daily. 30 tablet 2 5/1/2025 Morning    rivaroxaban (XARELTO) 20 MG tablet Take 1 tablet by mouth Daily With Dinner for 30 days. Indications: Atrial Fibrillation 30 tablet 0 5/1/2025 Evening    albuterol sulfate  (90 Base) MCG/ACT inhaler Inhale 2 puffs Every 4 (Four) Hours As Needed for Wheezing. 8 g 11 Unknown       Allergies:  Allergies   Allergen Reactions    Lisinopril-Hydrochlorothiazide Cough       Past Social History:  Social History     Socioeconomic History    Marital status:    Tobacco Use     Smoking status: Light Smoker     Types: Cigars     Passive exposure: Current    Smokeless tobacco: Never    Tobacco comments:     1 cigar monthly   Vaping Use    Vaping status: Never Used   Substance and Sexual Activity    Alcohol use: Yes     Comment: caffeine use    Drug use: Never    Sexual activity: Defer       Past Family History:  Family History   Problem Relation Age of Onset    Heart disease Mother 50    Diabetes Father     Sudden death Maternal Uncle 52    Stroke Maternal Grandmother 50    Diabetes Paternal Grandmother     Diabetes Paternal Grandfather     Hypertension Other     Heart disease Other         ischemic    Diabetes Other        Review of Systems: All systems reviewed. Pertinent positives identified in HPI. All other systems are negative.     14 point ROS was performed and is negative except as outlined in HPI.     Objective:     Objective:  Vital Signs (last 24 hours)         05/01 0700  05/02 0659 05/02 0700  05/02 1204   Most Recent      Temp (°F) 97.3 -  98.2      97.1     97.1 (36.2) 05/02 0803    Heart Rate 68 -  154    88 -  94     94 05/02 0927    Resp 18 -  20      16     16 05/02 0803    BP 96/71 -  124/51    104/82 -  126/78     126/78 05/02 0927    SpO2 (%) 91 -  98      96     96 05/02 0803          Temp:  [97.1 °F (36.2 °C)-98.2 °F (36.8 °C)] 97.1 °F (36.2 °C)  Heart Rate:  [] 94  Resp:  [16-20] 16  BP: ()/() 126/78  Body mass index is 43.24 kg/m².        Physical Exam:     General Appearance: No acute distress, well developed and well nourished.   Respiratory: No signs of respiratory distress. Respiration rhythm and depth normal.   Cardiovascular:  Heart Rate and Rhythm: Irregularly irregular, Heart Sounds: Normal S1 and S2. No S3 or S4 noted  Skin: Warm and dry.   Psychiatric: Patient alert and oriented to person, place, and time. Speech and behavior appropriate. Normal mood and affect.    Labs:   Lab Review:     Results from last 7 days   Lab Units  05/02/25  0446 05/01/25  2140 04/30/25  0534 04/29/25  0749 04/28/25 2014   SODIUM mmol/L 141 135* 141 138 137   POTASSIUM mmol/L 4.2 4.1 3.9 4.1 3.4*   CHLORIDE mmol/L 106 103 108* 105 103   CO2 mmol/L 22.0 23.1 22.1 24.0 22.0   BUN mg/dL 20 18 19 16 18   CREATININE mg/dL 1.10 1.26 1.00 0.92 1.16   GLUCOSE mg/dL 99 98 101* 107* 120*   CALCIUM mg/dL 9.0 9.6 8.7 8.8 9.2   AST (SGOT) U/L  --  23  --  17 17   ALT (SGPT) U/L  --  30  --  21 24     Results from last 7 days   Lab Units 05/02/25  0446 05/01/25  2242 05/01/25  2140   HSTROP T ng/L 34* 32* 28*     Results from last 7 days   Lab Units 05/02/25 0446   WBC 10*3/mm3 9.42   HEMOGLOBIN g/dL 15.3   HEMATOCRIT % 48.3   PLATELETS 10*3/mm3 226     Results from last 7 days   Lab Units 04/28/25  2137   INR  1.26*   APTT seconds 25.4         Results from last 7 days   Lab Units 05/02/25 0446   MAGNESIUM mg/dL 2.3         Results from last 7 days   Lab Units 04/30/25 0534   PROBNP pg/mL 1,768.0*         Results from last 7 days   Lab Units 04/28/25 2014   TSH uIU/mL 2.820       I personally viewed and interpreted the patient's EKG/Telemetry tracings.    Assessment:       Atrial fibrillation with RVR        Plan:   He has new onset symptomatic atrial fibrillation with RVR.    He notes significant improvement in his symptoms when his rate is controlled.  I am going to transition him to atenolol 50 mg twice daily, we will have to monitor his blood pressure.    I am going to check a digoxin level, I will plan on giving him full digoxin load and starting p.o. digoxin.    He has not been on anticoagulation.  So for now the option is rate control.  If we are not successful with rate control and he continues to have symptoms then we will have to consider HERMELINDO cardioversion which I discussed with him and his wife at bedside.    I told him I would at least plan on keeping him another 24 hours and working on rate control.  If we are unable to achieve this over the weekend then  we will have to consider HERMELINDO cardioversion next week either.  He will stay in the hospital until then or he can go home and come back.    Will get him a follow-up in EP clinic to discuss long-term treatment of his atrial fibrillation.        Thank you for allowing me to participate in the care of Jaime Lyle Jr.. Feel free to contact me directly with any further questions or concerns.    RIKKI Sanderson  [facility]  05/02/25  12:04 EDT

## 2025-05-02 NOTE — ED PROVIDER NOTES
EMERGENCY DEPARTMENT ENCOUNTER  Room Number:  13/13  PCP: Zehra Shen APRN  Independent Historians: Patient and Family      HPI:  Chief Complaint: had concerns including Chest Pain.     A complete HPI/ROS/PMH/PSH/SH/FH are unobtainable due to: None    Chronic or social conditions impacting patient care (Social Determinants of Health): None      Context: Jaime Lyle Jr. is a 54 y.o. male with a medical history of atrial fibrillation, hyperglycemia, sleep apnea, systolic heart failure who presents to the ED c/o acute chest pain palpitations.  The patient reports that he developed chest pain and palpitations just prior to arrival.  He states it has been lasting for the last 2 to 3 hours.  He states that he was discharged in the hospital yesterday after having A-fib with RVR.  He states he feels like he is back in A-fib with RVR.  He reports his chest pain has now resolved.  It was on along the anterior part of his chest.  He states he has been taking all of his medications.  He reports a few weeks ago he had pneumonia and was started on steroids and then developed leg swelling and was put on Lasix.      Review of prior external notes (non-ED) -and- Review of prior external test results outside of this encounter:  Discharge summary dated yesterday notes new onset A-fib with RVR.  He was put on Xarelto for anticoagulation.  He was initiated on Cardizem and metoprolol.  He was discharged home on metoprolol 27.5 mg daily and his outpatient Lasix was continued.    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    Gastroesophageal reflux disease 09/06/2022    Bronchitis 12/29/2022    Need for vaccination against Streptococcus pneumoniae 01/10/2024    Personal history of gastric banding 01/10/2024    Class 3 severe obesity with serious comorbidity and body mass index (BMI) of 40.0 to 44.9 in adult 01/10/2024    Screening for malignant neoplasm of colon 01/10/2024    Annual physical exam 01/11/2024    Chronic pain of left knee 01/11/2024    Vitamin D deficiency 09/11/2024    Chronic fatigue 09/11/2024    Snoring 09/11/2024    STEVE (obstructive sleep apnea) 01/24/2025    Atrial fibrillation with RVR 04/28/2025    Acute systolic heart failure 04/30/2025     Resolved Ambulatory Problems     Diagnosis Date Noted    No Resolved Ambulatory Problems     Past Medical History:   Diagnosis Date    Acid reflux     Acute medial meniscus tear     Community acquired pneumonia     Mild mitral regurgitation     Pulmonic regurgitation     Tricuspid regurgitation          PAST SURGICAL HISTORY  Past Surgical History:   Procedure Laterality Date    LAPAROSCOPIC GASTRIC BANDING      ROTATOR CUFF REPAIR           FAMILY HISTORY  Family History   Problem Relation Age of Onset    Heart disease Mother 50    Diabetes Father     Sudden death Maternal Uncle 52    Stroke Maternal Grandmother 50    Diabetes Paternal Grandmother     Diabetes Paternal Grandfather     Hypertension Other     Heart disease Other         ischemic    Diabetes Other          SOCIAL HISTORY  Social History     Socioeconomic History    Marital status:    Tobacco Use    Smoking status: Light Smoker     Types: Cigars     Passive exposure: Current    Smokeless tobacco: Never    Tobacco comments:     1 cigar monthly   Vaping Use    Vaping status: Never Used   Substance and Sexual Activity    Alcohol use: Yes     Comment:  caffeine use    Drug use: Never    Sexual activity: Defer         ALLERGIES  Lisinopril-hydrochlorothiazide      REVIEW OF SYSTEMS  Review of Systems  Included in HPI  All systems reviewed and negative except for those discussed in HPI.      PHYSICAL EXAM    I have reviewed the triage vital signs and nursing notes.    ED Triage Vitals   Temp Heart Rate Resp BP SpO2   05/01/25 2115 05/01/25 2115 05/01/25 2115 05/01/25 2120 05/01/25 2115   98.2 °F (36.8 °C) (!) 154 18 124/51 96 %      Temp src Heart Rate Source Patient Position BP Location FiO2 (%)   -- 05/01/25 2141 05/01/25 2120 05/01/25 2120 --    Monitor Lying Right arm        Physical Exam  GENERAL: Awake, alert, no acute distress  SKIN: Warm, dry  HENT: Normocephalic, atraumatic  EYES: no scleral icterus  CV: Irregular rhythm, irregular rate  RESPIRATORY: normal effort, lungs clear  ABDOMEN: soft, nontender, nondistended  MUSCULOSKELETAL: no deformity  NEURO: alert, moves all extremities, follows commands            LAB RESULTS  Recent Results (from the past 24 hours)   ECG 12 Lead ED Triage Standing Order; Chest Pain    Collection Time: 05/01/25  9:20 PM   Result Value Ref Range    QT Interval 322 ms    QTC Interval 484 ms   Comprehensive Metabolic Panel    Collection Time: 05/01/25  9:40 PM    Specimen: Arm, Left; Blood   Result Value Ref Range    Glucose 98 65 - 99 mg/dL    BUN 18 6 - 20 mg/dL    Creatinine 1.26 0.76 - 1.27 mg/dL    Sodium 135 (L) 136 - 145 mmol/L    Potassium 4.1 3.5 - 5.2 mmol/L    Chloride 103 98 - 107 mmol/L    CO2 23.1 22.0 - 29.0 mmol/L    Calcium 9.6 8.6 - 10.5 mg/dL    Total Protein 7.3 6.0 - 8.5 g/dL    Albumin 4.0 3.5 - 5.2 g/dL    ALT (SGPT) 30 1 - 41 U/L    AST (SGOT) 23 1 - 40 U/L    Alkaline Phosphatase 65 39 - 117 U/L    Total Bilirubin 0.7 0.0 - 1.2 mg/dL    Globulin 3.3 gm/dL    A/G Ratio 1.2 g/dL    BUN/Creatinine Ratio 14.3 7.0 - 25.0    Anion Gap 8.9 5.0 - 15.0 mmol/L    eGFR 66.9 >60.0 mL/min/1.73   High Sensitivity  Troponin T    Collection Time: 05/01/25  9:40 PM    Specimen: Arm, Left; Blood   Result Value Ref Range    HS Troponin T 28 (H) <22 ng/L   Green Top (Gel)    Collection Time: 05/01/25  9:40 PM   Result Value Ref Range    Extra Tube Hold for add-ons.    Lavender Top    Collection Time: 05/01/25  9:40 PM   Result Value Ref Range    Extra Tube hold for add-on    Gold Top - SST    Collection Time: 05/01/25  9:40 PM   Result Value Ref Range    Extra Tube Hold for add-ons.    Light Blue Top    Collection Time: 05/01/25  9:40 PM   Result Value Ref Range    Extra Tube Hold for add-ons.    CBC Auto Differential    Collection Time: 05/01/25  9:40 PM    Specimen: Arm, Left; Blood   Result Value Ref Range    WBC 11.85 (H) 3.40 - 10.80 10*3/mm3    RBC 5.67 4.14 - 5.80 10*6/mm3    Hemoglobin 15.9 13.0 - 17.7 g/dL    Hematocrit 49.7 37.5 - 51.0 %    MCV 87.7 79.0 - 97.0 fL    MCH 28.0 26.6 - 33.0 pg    MCHC 32.0 31.5 - 35.7 g/dL    RDW 13.9 12.3 - 15.4 %    RDW-SD 44.5 37.0 - 54.0 fl    MPV 9.9 6.0 - 12.0 fL    Platelets 288 140 - 450 10*3/mm3    Neutrophil % 58.1 42.7 - 76.0 %    Lymphocyte % 31.9 19.6 - 45.3 %    Monocyte % 7.4 5.0 - 12.0 %    Eosinophil % 1.6 0.3 - 6.2 %    Basophil % 0.3 0.0 - 1.5 %    Immature Grans % 0.7 (H) 0.0 - 0.5 %    Neutrophils, Absolute 6.88 1.70 - 7.00 10*3/mm3    Lymphocytes, Absolute 3.78 (H) 0.70 - 3.10 10*3/mm3    Monocytes, Absolute 0.88 0.10 - 0.90 10*3/mm3    Eosinophils, Absolute 0.19 0.00 - 0.40 10*3/mm3    Basophils, Absolute 0.04 0.00 - 0.20 10*3/mm3    Immature Grans, Absolute 0.08 (H) 0.00 - 0.05 10*3/mm3    nRBC 0.0 0.0 - 0.2 /100 WBC         RADIOLOGY  XR Chest 1 View  Result Date: 5/1/2025  SINGLE VIEW OF THE CHEST  HISTORY: Chest pain  COMPARISON: April 28, 2025  FINDINGS: There is cardiomegaly. There is no vascular congestion. No pneumothorax, pleural effusion, or acute infiltrate is seen.      No acute findings.  This report was finalized on 5/1/2025 10:16 PM by Dr. Anglin  GINGER Mendez on Workstation: [id number]          MEDICATIONS GIVEN IN ER  Medications   sodium chloride 0.9 % flush 10 mL (has no administration in time range)   digoxin (LANOXIN) injection 500 mcg (has no administration in time range)   aspirin tablet 325 mg (325 mg Oral Given 5/1/25 2139)   metoprolol tartrate (LOPRESSOR) injection 5 mg (5 mg Intravenous Given 5/1/25 2225)         ORDERS PLACED DURING THIS VISIT:  Orders Placed This Encounter   Procedures    XR Chest 1 View    [place] Draw    Comprehensive Metabolic Panel    High Sensitivity Troponin T    CBC Auto Differential    BNP    High Sensitivity Troponin T 1Hr    NPO Diet NPO Type: Strict NPO    Undress & Gown    Continuous Pulse Oximetry    LCG (on-call MD unless specified)    Oxygen Therapy- Nasal Cannula; Titrate 1-6 LPM Per SpO2; 90 - 95%    ECG 12 Lead ED Triage Standing Order; Chest Pain    ECG 12 Lead ED Triage Standing Order; Chest Pain    Insert Peripheral IV    CBC & Differential    Green Top (Gel)    Lavender Top    Gold Top - SST    Light Blue Top         OUTPATIENT MEDICATION MANAGEMENT:  Current Facility-Administered Medications Ordered in Epic   Medication Dose Route Frequency Provider Last Rate Last Admin    digoxin (LANOXIN) injection 500 mcg  500 mcg Intravenous Once Shady Arrington MD        sodium chloride 0.9 % flush 10 mL  10 mL Intravenous PRN Shady Arrington MD         Current Outpatient Medications Ordered in Epic   Medication Sig Dispense Refill    albuterol sulfate  (90 Base) MCG/ACT inhaler Inhale 2 puffs Every 4 (Four) Hours As Needed for Wheezing. 8 g 11    furosemide (LASIX) 20 MG tablet Take 1 tablet by mouth Daily for 30 days. 30 tablet 0    metoprolol succinate XL (TOPROL-XL) 25 MG 24 hr tablet Take 1.5 tablets by mouth Daily for 30 days. 45 tablet 0    pantoprazole (PROTONIX) 40 MG EC tablet TAKE 1 TABLET BY MOUTH 2 TIMES A  tablet 0    potassium chloride 10 MEQ CR tablet Take 1 tablet by mouth  Daily. 30 tablet 2    rivaroxaban (XARELTO) 20 MG tablet Take 1 tablet by mouth Daily With Dinner for 30 days. Indications: Atrial Fibrillation 30 tablet 0         PROCEDURES  Procedures      Critical care provider statement:    Critical care time (minutes): 35.   Critical care time was exclusive of:  Separately billable procedures and treating other patients   Critical care was necessary to treat or prevent imminent or life-threatening deterioration of the following conditions:  Cardiac Failure   Critical care was time spent personally by me on the following activities:  Development of treatment plan with patient or surrogate, discussions with consultants, evaluation of patient's response to treatment, examination of patient, obtaining history from patient or surrogate, ordering and performing treatments and interventions, ordering and review of laboratory studies, ordering and review of radiographic studies, pulse oximetry, re-evaluation of patient's condition and review of old charts. Critical Care indicators:        PROGRESS, DATA ANALYSIS, CONSULTS, AND MEDICAL DECISION MAKING  All labs have been independently interpreted by me.  All radiology studies have been reviewed by me. All EKG's have been independently viewed and interpreted by me.  Discussion below represents my analysis of pertinent findings related to patient's condition, differential diagnosis, treatment plan and final disposition.    Differential diagnosis includes but is not limited to acute coronary syndrome, acute aortic syndrome, pneumothorax, unstable angina, A-fib,.    Clinical Scores:                                       ED Course as of 05/01/25 2236   Thu May 01, 2025   2125 EKG PROCEDURE    EKG time: 2120  Rhythm/Rate: Atrial flutter, rate 134  P waves and MS: Absent  QRS, axis: Narrow QRS, normal axis  ST and T waves: No acute    Independently Interpreted by me  Not significantly changed compared to prior 4/28/2025   [TR]   2139 XR Chest  1 View  My independent interpretation of the imaging study is no pneumothorax [TR]   2156 Hemoglobin: 15.9 [TR]   2215 proBNP(!): 1,768.0 [TR]   2220 Potassium: 4.1 [TR]   2221 HS Troponin T(!): 28 [TR]   2222 Heart rate is still 120s after 2 doses of IV Lopressor.  I have a call out to cardiology to discuss.  He will likely require admission and digoxin therapy. [TR]   2230 Discussing with Dr. Alvarez with cardiology.  She recommends 0.5 mg of digoxin and admission to the observation unit for further management. [TR]   2232 I reviewed the workup and findings with the patient and family at the bedside.  Answered all questions.  They are agreeable to the plan for admission and cardiology consult. [TR]   2235 Discussing with Yamila with the ED observation unit.  She accepts for admission. [TR]      ED Course User Index  [TR] Shady Arrington MD             AS OF 22:36 EDT VITALS:    BP - 109/97  HR - (!) 122  TEMP - 98.2 °F (36.8 °C)  O2 SATS - 96%    COMPLEXITY OF CARE  The patient requires admission.      DIAGNOSIS  Final diagnoses:   Atrial fibrillation with RVR         DISPOSITION  ED Disposition       ED Disposition   Decision to Admit    Condition   --    Comment   --                Please note that portions of this document were completed with a voice recognition program.    Note Disclaimer: At [facility], we believe that sharing information builds trust and better relationships. You are receiving this note because you recently visited [facility]. It is possible you will see health information before a provider has talked with you about it. This kind of information can be easy to misunderstand. To help you fully understand what it means for your health, we urge you to discuss this note with your provider.         Shady Arrington MD  05/01/25 2235

## 2025-05-02 NOTE — DISCHARGE INSTRUCTIONS
[facility]  [address] [place] [address]    Coronary Angiogram (Radial/Ulnar Approach) After Care    Refer to this sheet in the next few weeks. These instructions provide you with information on caring for yourself after your procedure. Your caregiver may also give you more specific instructions. Your treatment has been planned according to current medical practices, but problems sometimes occur. Call your caregiver if you have any problems or questions after your procedure.    Home Care Instructions:  You may shower the day after the procedure. Remove the bandage (dressing) and gently wash the site with plain soap and water. Gently pat the site dry. You may apply a band aid daily for 2 days if desired.    Do not apply powder or lotion to the site.  Do not submerge the affected site in water for 3 to 5 days or until the site is completely healed.   Do not lift, push or pull anything over 5 pounds for 5 days after your procedure or as directed by your physician.  As a reference, a gallon of milk weighs 8 pounds.   Inspect the site at least twice daily. You may notice some bruising at the site and it may be tender for 1 to 2 weeks.     Increase your fluid intake for the next 2 days.    Keep arm elevated for 24 hours. For the remainder of the day, keep your arm in “Pledge of Allegiance” position when up and about.     You may drive 24 hours after the procedure unless otherwise instructed by your caregiver.  Do not operate machinery or power tools for 24 hours.  A responsible adult should be with you for the first 24 hours after you arrive home. Do not make any important legal decisions or sign legal papers for 24 hours.  Do not drink alcohol for 24 hours.    Metformin or any medications containing Metformin should not be taken for 48 hours after your procedure.      Call Your Doctor if:   You have unusual pain at the radial/ulnar (wrist) site.  You have redness, warmth, swelling, or pain at the  radial/ulnar (wrist) site.  You have drainage (other than a small amount of blood on the dressing).  `You have chills or a fever > 101.  Your arm becomes pale or dark, cool, tingly, or numb.  You develop chest pain, shortness of breath, feel faint or pass out.    You have heavy bleeding from the site, hold pressure on the site for 20 minutes.  If the bleeding stops, apply a fresh bandage and call your cardiologist.  However, if you        continue to have bleeding, call 911 and continue to apply pressure to the site.   You have any symptoms of a stroke.  Remember BE FAST  B-balance. Sudden trouble walking or loss of balance.  E-eyes.  Sudden changes in how you see or a sudden onset of a very bad headache.   F-face. Sudden weakness or loss of feeling of the face or facial droop on one side.   A-arms Sudden weakness or numbness in one arm.  One arm drifts down if they are both held out in front of you. This happens suddenly and usually on one side of the body.   S-speech.  Sudden trouble speaking, slurred speech or trouble understanding what are saying.   T-time  Time to call emergency services.  Write down the symptoms and the time they started.

## 2025-05-02 NOTE — PROGRESS NOTES
ED OBSERVATION PROGRESS/DISCHARGE SUMMARY    Date of Admission: 5/1/2025   LOS: 0 days   PCP: Zehra Shen APRN    Working diagnosis: Abnormal stress test, A-fib RVR, Cardiomyopathy      Subjective     Hospital Outcome: Pending/transfer to cardiology services    Jaime Lyle Jr. is a 54 y.o. male who was admitted to the ED observation unit for further evaluation of A-fib with RVR.  Patient had apparently been seen and recently admitted for A-fib with RVR.  Patient was discharged on Xarelto and Toprol-XL 37.5 mg daily.  Yesterday morning he took a short walk around his property without any issues but by mid afternoon he began to again develop palpitations, chest heaviness, SOA.  Patient given 2 doses of metoprolol in the ED and symptoms improved.  He was placed in ED opts for further cardiology eval and to see if medication adjustments are needed.    12:52 PM.  Cardiology following.  Given recurrent A-fib with rapid rates, to continue metoprolol at current regimen and add low-dose digoxin.  In addition to A-fib with RVR patient also reporting bradycardia with rates in the 40s and 50s.  EP service consulted to evaluate.  In the setting of frequent PVCs suspect some of this is due to nonconducted PVCs.  Last stress test was 5 years ago.  There is a plan to check a Lexiscan Cardiolite stress test in the setting of his elevated troponin and cardiomyopathy.  If negative it is likely the cardiomyopathy is attributed to his A-fib and rapid rate.  Acute systolic heart failure has been attributed to his A-fib.  Stress test to check for ischemia.  Blood pressure is too low to add ARB and the patient is intolerant of ACE inhibitors.    EP is also evaluated.  They recommend transitioning him to atenolol 50 mg twice daily and stopping the metoprolol.  His blood pressure will need to be monitored with this transition.  Dig level to be checked and he will get a full digoxin load.  He will be started on p.o. digoxin.  If  rate control is unsuccessful and he continues to have symptoms HERMELINDO cardioversion will have to be considered.  EP recommends keeping for at least another 24 hours and working on rate control.  If this cannot be achieved he will need HERMELINDO cardioversion next week.  He is to follow-up with the EP after discharge to discuss long-term treatment of his A-fib.    2:26 PM.  There is now plans for cardiac cath likely due to stress test findings.  Will transfer to cardiology when the time comes for cardiac cath.    ROS:  General: no fevers, chills  Respiratory: no cough, dyspnea  Cardiovascular: Palpitations  Abdomen: No abdominal pain, nausea, vomiting, or diarrhea  Neurologic: No focal weakness      Objective   Physical Exam:  I have reviewed the vital signs.  Temp:  [97.1 °F (36.2 °C)-98.2 °F (36.8 °C)] 97.1 °F (36.2 °C)  Heart Rate:  [] 94  Resp:  [16-20] 16  BP: ()/() 126/78  General Appearance:    Alert, cooperative, no distress  Head:    Normocephalic, atraumatic  Eyes:    Sclerae anicteric  Neck:   Supple, no mass  Lungs: Clear to auscultation bilaterally, respirations unlabored  Heart: Regular rate and rhythm, S1 and S2 normal, no murmur, rub or gallop  Abdomen:  Soft, nontender, bowel sounds active, nondistended  Extremities: No clubbing, cyanosis, or edema to lower extremities  Pulses:  2+ and symmetric in distal lower extremities  Skin: No rashes   Neurologic: Oriented x3, Normal strength to extremities    Results Review:    I have reviewed the labs, radiology results and diagnostic studies.    Results from last 7 days   Lab Units 05/02/25 0446   WBC 10*3/mm3 9.42   HEMOGLOBIN g/dL 15.3   HEMATOCRIT % 48.3   PLATELETS 10*3/mm3 226     Results from last 7 days   Lab Units 05/02/25  0446 05/01/25  2140 04/30/25  0534 04/29/25  0749 04/28/25 2014   SODIUM mmol/L 141 135* 141 138 137   POTASSIUM mmol/L 4.2 4.1 3.9 4.1 3.4*   CHLORIDE mmol/L 106 103 108* 105 103   CO2 mmol/L 22.0 23.1 22.1 24.0 22.0    BUN mg/dL 20 18 19 16 18   CREATININE mg/dL 1.10 1.26 1.00 0.92 1.16   CALCIUM mg/dL 9.0 9.6 8.7 8.8 9.2   BILIRUBIN mg/dL  --  0.7  --  0.8 0.6   ALK PHOS U/L  --  65  --  60 71   ALT (SGPT) U/L  --  30  --  21 24   AST (SGOT) U/L  --  23  --  17 17   GLUCOSE mg/dL 99 98 101* 107* 120*     Imaging Results (Last 24 Hours)       Procedure Component Value Units Date/Time    XR Chest 1 View [[id number]] Collected: 05/01/25 2215     Updated: 05/01/25 2219    Narrative:      SINGLE VIEW OF THE CHEST     HISTORY: Chest pain     COMPARISON: April 28, 2025     FINDINGS:  There is cardiomegaly. There is no vascular congestion. No pneumothorax,  pleural effusion, or acute infiltrate is seen.       Impression:      No acute findings.     This report was finalized on 5/1/2025 10:16 PM by Dr. Linnette Mendez M.D on Workstation: [id number]               I have reviewed the medications.     Discharge Medications        ASK your doctor about these medications        Instructions Start Date   albuterol sulfate  (90 Base) MCG/ACT inhaler  Commonly known as: PROVENTIL HFA;VENTOLIN HFA;PROAIR HFA   2 puffs, Inhalation, Every 4 Hours PRN      furosemide 20 MG tablet  Commonly known as: LASIX   20 mg, Oral, Daily      metoprolol succinate XL 25 MG 24 hr tablet  Commonly known as: TOPROL-XL   37.5 mg, Oral, Every 24 Hours Scheduled      pantoprazole 40 MG EC tablet  Commonly known as: PROTONIX   40 mg, Oral, 2 Times Daily      potassium chloride 10 MEQ CR tablet   10 mEq, Oral, Daily      Xarelto 20 MG tablet  Generic drug: rivaroxaban   20 mg, Oral, Daily With Dinner              ---------------------------------------------------------------------------------------------  Assessment & Plan   Assessment/Problem List    Atrial fibrillation with RVR      Plan:    A-fib with RVR  HFrEF  -Initial troponin 20, repeat troponin 32  -EKG is nonischemic, sinus rhythm, heart rate 130 bpm  -Continuous cardiac monitoring  -TTE April 24,  2025 show LVEF = 41 to 45%  -Trend troponin, EKG  -Cardiology, EP cardiology following  -Plan as above        VTE Prophylaxis:  Mechanical VTE prophylaxis orders are present.    Disposition: Pending    Follow-up after Discharge: Pending    This note will serve as a progress note    Iron Hernandez III, PA 05/02/25 14:59 EDT    I have worn appropriate PPE during this patient encounter, sanitized my hands both with entering and exiting patient's room.      52 minutes has been spent by [facility] providers in the care of this patient while under observation status on this date 05/02/25

## 2025-05-02 NOTE — PLAN OF CARE
"  Problem: Adult Inpatient Plan of Care  Goal: Plan of Care Review  Outcome: Progressing  Flowsheets (Taken 5/2/2025 0515)  Progress: improving  Outcome Evaluation: Pt admitted for Afib with RVR. Stable overnight, HR in the 's, BP soft but \"normal\" for Pt. Has not needed medications while in observation. Cards consulted.  Plan of Care Reviewed With: patient  Goal: Patient-Specific Goal (Individualized)  Outcome: Progressing  Goal: Absence of Hospital-Acquired Illness or Injury  Outcome: Progressing  Intervention: Identify and Manage Fall Risk  Recent Flowsheet Documentation  Taken 5/2/2025 0430 by Jacquelyn Wise RN  Safety Promotion/Fall Prevention:   clutter free environment maintained   nonskid shoes/slippers when out of bed   room organization consistent   safety round/check completed  Taken 5/2/2025 0200 by Jacquelyn Wise RN  Safety Promotion/Fall Prevention:   room organization consistent   safety round/check completed  Taken 5/2/2025 0000 by Jacquelyn Wise RN  Safety Promotion/Fall Prevention:   clutter free environment maintained   nonskid shoes/slippers when out of bed   room organization consistent   safety round/check completed  Intervention: Prevent Skin Injury  Recent Flowsheet Documentation  Taken 5/2/2025 0430 by Jacquelyn Wise RN  Body Position: position changed independently  Taken 5/2/2025 0200 by Jacquelyn Wise RN  Body Position: position changed independently  Taken 5/2/2025 0000 by Jacquelyn Wise RN  Body Position: position changed independently  Intervention: Prevent Infection  Recent Flowsheet Documentation  Taken 5/2/2025 0430 by Jacquelyn Wise RN  Infection Prevention:   equipment surfaces disinfected   hand hygiene promoted   personal protective equipment utilized   rest/sleep promoted   single patient room provided  Taken 5/2/2025 0000 by Jacquelyn Wise RN  Infection Prevention:   equipment surfaces disinfected   hand hygiene promoted   personal protective " "equipment utilized   rest/sleep promoted   single patient room provided  Goal: Optimal Comfort and Wellbeing  Outcome: Progressing  Intervention: Provide Person-Centered Care  Recent Flowsheet Documentation  Taken 5/2/2025 0000 by Jacquelyn Wise RN  Trust Relationship/Rapport:   care explained   choices provided   questions answered   questions encouraged   thoughts/feelings acknowledged  Goal: Readiness for Transition of Care  Outcome: Progressing   Goal Outcome Evaluation:  Plan of Care Reviewed With: patient        Progress: improving  Outcome Evaluation: Pt admitted for Afib with RVR. Stable overnight, HR in the 's, BP soft but \"normal\" for Pt. Has not needed medications while in observation. Cards consulted.                             "

## 2025-05-02 NOTE — PLAN OF CARE
Goal Outcome Evaluation:              Outcome Evaluation: Pt A&Ox4, VSS and no c/o pain. Pt admitted to CVU post cardiac cath. TR band D/C'd at 1830. Will CTM.

## 2025-05-02 NOTE — H&P
[facility]   HISTORY AND PHYSICAL    Patient Name: Jaime Lyle Jr.  : 1970  MRN: [id number]  Primary Care Physician:  Zehra Shen APRN  Date of admission: 2025    Subjective   Subjective     Chief Complaint:   Chief Complaint   Patient presents with    Chest Pain         HPI:    Jaime Lyle Jr. is a 54 y.o. male with medical history significant for HFrEF, STEVE presents with chest heaviness, palpitation, and associated SOA.  He was found to be in A-fib with RVR in the ED.  He reports he was recently admitted for A-fib with RVR and was just discharged yesterday after hospitalization on Xarelto and Toprol XL 37.5 mg daily.  He reports yesterday morning he walked a lap around his property without any issues, in the afternoon he stepped out of his house and immediately felt palpitations, chest heaviness, and SOA.  He reports symptoms improved after his second dose of metoprolol in the ED.     Review of Systems   All systems were reviewed and negative except for: Transient palpitation, chest heaviness, SOA.      Personal History     Past Medical History:   Diagnosis Date    Acid reflux     Acute medial meniscus tear     left knee    Community acquired pneumonia     Mild mitral regurgitation     Obesity (BMI 35.0-39.9 without comorbidity)     STEVE (obstructive sleep apnea)     Pulmonic regurgitation     trace    PVC's (premature ventricular contractions)     Tricuspid regurgitation     trace       Past Surgical History:   Procedure Laterality Date    LAPAROSCOPIC GASTRIC BANDING      ROTATOR CUFF REPAIR         Family History: family history includes Diabetes in his father, paternal grandfather, paternal grandmother, and another family member; Heart disease in an other family member; Heart disease (age of onset: 50) in his mother; Hypertension in an other family member; Stroke (age of onset: 50) in his maternal grandmother; Sudden death (age of onset: 52) in his maternal uncle. Otherwise  pertinent FHx was reviewed and not pertinent to current issue.    Social History:  reports that he has been smoking cigars. He has been exposed to tobacco smoke. He has never used smokeless tobacco. He reports current alcohol use. He reports that he does not use drugs.    Home Medications:  albuterol sulfate HFA, furosemide, metoprolol succinate XL, pantoprazole, potassium chloride, and rivaroxaban    Allergies:  Allergies   Allergen Reactions    Lisinopril-Hydrochlorothiazide Cough       Objective   Objective     Vitals:   Temp:  [98.2 °F (36.8 °C)] 98.2 °F (36.8 °C)  Heart Rate:  [114-154] 114  Resp:  [18-20] 20  BP: ()/() 97/85   Physical Exam:     Constitutional: Awake, alert. Well developed for age. Nontoxic appearing.   Eyes: PERRL x2, sclerae anicteric, no conjunctival injection. No EOM abnormalities.   HENT: NCAT, mucous membranes moist,   Neck: Supple, no thyromegaly, no lymphadenopathy, trachea midline  Respiratory: Clear to auscultation bilaterally, nonlabored respirations   Cardiovascular: Irregular, no murmurs, rubs, or gallops, palpable pedal pulses bilaterally. No appreciable edema.   Gastrointestinal: Positive bowel sounds, soft, nontender, not distended.   Musculoskeletal: No bilateral ankle edema, no clubbing or cyanosis to extremities. No obvious deformities.   Psychiatric: Appropriate affect, cooperative. Converses appropriately for age.   Neurologic: Oriented x 3, strength symmetric in all extremities. Cranial nerves grossly intact to confrontation, speech clear  Skin: No rashes, skin intact.     Result Review    Result Review:  I have personally reviewed the results from the time of this admission to 5/1/2025 22:44 EDT and agree with these findings:  [x]  Laboratory list / accordion  []  Microbiology  [x]  Radiology  [x]  EKG/Telemetry   [x]  Cardiology/Vascular   []  Pathology  [x]  Old records  []  Other:  Most notable findings include:     ED workup includes high-sensitivity  troponin T 28, EKG shows A-fib with RVR, heart rate = 138 bpm.  proBNP 1768, CMP is unremarkable, WBC 11.85.  Chest x-ray shows cardiomegaly, without acute intrathoracic process.     TTE from 4/29/2025 show LVEF = 41 to 45%.    Assessment & Plan   Assessment / Plan     Brief Patient Summary:  Jaime Lyle Jr. is a 54 y.o. male who returns to the ED with A-fib with RVR after recent hospitalization for the same problem.  He experienced transient palpitations, chest heaviness, SOA he denies recent illness, denies exertion.  In the ED troponin was 28, 32.  EKG shows A-fib with RVR, heart rate = 130 bpm.  proBNP was 1768.    Active Hospital Problems:  Active Hospital Problems    Diagnosis     **Atrial fibrillation with RVR      Plan:     A-fib with RVR  HFrEF  -Initial troponin 20, repeat troponin 32  -EKG is nonischemic, sinus rhythm, heart rate 130 bpm  -Continuous cardiac monitoring  -TTE April 24, 2025 show LVEF = 41 to 45%  -Trend troponin, EKG  -Cardiology consultation      VTE Prophylaxis:  Mechanical VTE prophylaxis orders are present.        CODE STATUS: Full code     Admission Status:  I believe this patient meets observation status.    Electronically signed by RIKKI Johnson, 05/01/25, 10:44 PM EDT.        80 minutes has been spent by [facility] providers in the care of this patient while under observation status on this date 05/01/25      I have worn appropriate PPE during this patient encounter, sanitized my hands both with entering and exiting patient's room.    I have discussed plan of care with patient including advance care plan and/or surrogate decision maker.  Patient advises that their spouse, Mady Lyle, will be their primary surrogate decision maker

## 2025-05-03 ENCOUNTER — READMISSION MANAGEMENT (OUTPATIENT)
Dept: CALL CENTER | Facility: HOSPITAL | Age: 55
End: 2025-05-03
Payer: COMMERCIAL

## 2025-05-03 VITALS
DIASTOLIC BLOOD PRESSURE: 77 MMHG | OXYGEN SATURATION: 97 % | HEART RATE: 82 BPM | WEIGHT: 315 LBS | HEIGHT: 72 IN | BODY MASS INDEX: 42.66 KG/M2 | RESPIRATION RATE: 18 BRPM | SYSTOLIC BLOOD PRESSURE: 106 MMHG | TEMPERATURE: 98.1 F

## 2025-05-03 LAB
ANION GAP SERPL CALCULATED.3IONS-SCNC: 9.7 MMOL/L (ref 5–15)
BUN SERPL-MCNC: 19 MG/DL (ref 6–20)
BUN/CREAT SERPL: 18.1 (ref 7–25)
CALCIUM SPEC-SCNC: 8.8 MG/DL (ref 8.6–10.5)
CHLORIDE SERPL-SCNC: 107 MMOL/L (ref 98–107)
CO2 SERPL-SCNC: 23.3 MMOL/L (ref 22–29)
CREAT SERPL-MCNC: 1.05 MG/DL (ref 0.76–1.27)
DEPRECATED RDW RBC AUTO: 43 FL (ref 37–54)
EGFRCR SERPLBLD CKD-EPI 2021: 83.3 ML/MIN/1.73
ERYTHROCYTE [DISTWIDTH] IN BLOOD BY AUTOMATED COUNT: 13.8 % (ref 12.3–15.4)
GLUCOSE BLDC GLUCOMTR-MCNC: 99 MG/DL (ref 70–130)
GLUCOSE SERPL-MCNC: 95 MG/DL (ref 65–99)
HCT VFR BLD AUTO: 46.5 % (ref 37.5–51)
HGB BLD-MCNC: 15 G/DL (ref 13–17.7)
MCH RBC QN AUTO: 27.7 PG (ref 26.6–33)
MCHC RBC AUTO-ENTMCNC: 32.3 G/DL (ref 31.5–35.7)
MCV RBC AUTO: 86 FL (ref 79–97)
PLATELET # BLD AUTO: 240 10*3/MM3 (ref 140–450)
PMV BLD AUTO: 9.4 FL (ref 6–12)
POTASSIUM SERPL-SCNC: 4.4 MMOL/L (ref 3.5–5.2)
RBC # BLD AUTO: 5.41 10*6/MM3 (ref 4.14–5.8)
SODIUM SERPL-SCNC: 140 MMOL/L (ref 136–145)
WBC NRBC COR # BLD AUTO: 9.13 10*3/MM3 (ref 3.4–10.8)

## 2025-05-03 PROCEDURE — 85027 COMPLETE CBC AUTOMATED: CPT | Performed by: INTERNAL MEDICINE

## 2025-05-03 PROCEDURE — G0378 HOSPITAL OBSERVATION PER HR: HCPCS

## 2025-05-03 PROCEDURE — 82948 REAGENT STRIP/BLOOD GLUCOSE: CPT

## 2025-05-03 PROCEDURE — 99239 HOSP IP/OBS DSCHRG MGMT >30: CPT | Performed by: INTERNAL MEDICINE

## 2025-05-03 PROCEDURE — 80048 BASIC METABOLIC PNL TOTAL CA: CPT | Performed by: INTERNAL MEDICINE

## 2025-05-03 RX ORDER — DIGOXIN 250 MCG
250 TABLET ORAL
Qty: 30 TABLET | Refills: 1 | Status: SHIPPED | OUTPATIENT
Start: 2025-05-03

## 2025-05-03 RX ORDER — ATENOLOL 50 MG/1
50 TABLET ORAL EVERY 12 HOURS SCHEDULED
Qty: 60 TABLET | Refills: 3 | Status: SHIPPED | OUTPATIENT
Start: 2025-05-03

## 2025-05-03 RX ADMIN — POTASSIUM CHLORIDE 10 MEQ: 750 TABLET, EXTENDED RELEASE ORAL at 08:40

## 2025-05-03 RX ADMIN — FUROSEMIDE 20 MG: 40 TABLET ORAL at 08:34

## 2025-05-03 RX ADMIN — ACETAMINOPHEN 650 MG: 325 TABLET ORAL at 06:56

## 2025-05-03 RX ADMIN — ATENOLOL 50 MG: 25 TABLET ORAL at 08:39

## 2025-05-03 RX ADMIN — PANTOPRAZOLE SODIUM 40 MG: 40 TABLET, DELAYED RELEASE ORAL at 08:35

## 2025-05-03 RX ADMIN — DIGOXIN 250 MCG: 0.12 TABLET ORAL at 08:40

## 2025-05-03 NOTE — DISCHARGE SUMMARY
[facility] Discharge Summary      Patient Name: Jaime Lyle Jr.  Age/Sex: 54 y.o. male  : 1970  MRN: [id number]    Encounter Provider: Ray Castellanos MD  Referring Provider: Shady Arrington MD  Place of Service: [facility] CARDIOLOGY  Patient Care Team:  Zehra Shen APRN as PCP - General (Nurse Practitioner)  Chandra Nicholson MD as Consulting Physician (Cardiology)         Date of Discharge:  5/3/2025     Date of Admit: 2025      Discharge Diagnosis:   Atrial fibrillation with RVR        Hospital Course:   Mr. Lyle is a 54 y.o. gentleman who presents to the hospital with new onset atrial fibrillation with rapid ventricular response he has actually been in and out of our institution over the last 5 days.  He was found to have a mildly reduced ejection fraction initial attempts were made to try and optimize his medical regimen to control his heart rate but came back with recurrent atrial fibrillation given the concerns about his cardiomyopathy initial plans were trying to do a stress test but due to his body habitus would be a 2-day stress test he had issues with claustrophobia with a stress test and thus was ultimately referred for heart catheterization.  EP was also asked to see the patient there have been some adjustments made with his medical regimen to try and help out with his rate control after changing over to atenolol and digoxin heart rate is much better controlled plans are for further evaluation for more definitive treatment options given his cardiomyopathy and severe symptoms with atrial fibrillation.  He was monitored overnight after his heart catheterization to ensure adequate heart rate control and fortunately is doing well this morning.  Will discharge home with plans to follow-up with electrophysiology and cardiology.    Physical Examination:   Constitutional: Well appearing, well developed, no acute distress   HENT:  Oropharynx clear and membrane moist  Eyes: Normal conjunctiva, no sclera icterus.  Neck: Supple, no carotid bruit bilaterally.  Cardiovascular: Irregularly irregular rate and rhythm, No Murmur, No bilateral lower extremity edema.  Pulmonary: Normal respiratory effort, normal lung sounds, no wheezing.  Abdominal: Soft, nontender, no hepatosplenomegaly, liver is non-pulsatile.  Neurological: Alert and orient x 3.   Skin: Warm, dry, no ecchymosis, no rash.  Psych: Appropriate mood and affect. Normal judgment and insight.    Procedures Performed:  Procedure(s):  Left Heart Cath         Consults:  Consults       Date and Time Order Name Status Description    5/2/2025 11:13 AM Cardiac Electrophysiologist Inpatient Consult Completed     5/2/2025  1:59 AM Inpatient Cardiology Consult Completed     5/1/2025 10:22 PM LCG (on-call MD unless specified)      4/29/2025 12:09 PM Inpatient Cardiology Consult Completed             Pertinent Test Results:    Results from last 7 days   Lab Units 05/03/25 0342 05/02/25 0446 05/01/25 2140   SODIUM mmol/L 140 141 135*   POTASSIUM mmol/L 4.4 4.2 4.1   CHLORIDE mmol/L 107 106 103   CO2 mmol/L 23.3 22.0 23.1   BUN mg/dL 19 20 18   CREATININE mg/dL 1.05 1.10 1.26   GLUCOSE mg/dL 95 99 98   CALCIUM mg/dL 8.8 9.0 9.6       Results from last 7 days   Lab Units 05/02/25  0446 05/01/25 2242 05/01/25 2140 04/28/25 2137 04/28/25 2014   HSTROP T ng/L 34* 32* 28* 37* 41*     Results from last 7 days   Lab Units 05/03/25  0342 05/02/25  0446 05/01/25  2140 04/30/25  0534 04/29/25  0749 04/28/25 2014   WBC 10*3/mm3 9.13 9.42 11.85* 8.43 7.83 12.31*   HEMOGLOBIN g/dL 15.0 15.3 15.9 14.6 14.7 15.9   HEMATOCRIT % 46.5 48.3 49.7 43.6 44.6 48.6   PLATELETS 10*3/mm3 240 226 288 240 229 247     Results from last 7 days   Lab Units 04/28/25  2137   INR  1.26*   APTT seconds 25.4     Results from last 7 days   Lab Units 05/02/25  0446 04/30/25  0534 04/29/25  0749 04/28/25 2014   MAGNESIUM mg/dL 2.3  2.2 2.1 2.0         Results from last 7 days   Lab Units 04/30/25  0534 04/28/25 2014   PROBNP pg/mL 1,768.0* 2,059.0*     Results from last 7 days   Lab Units 04/28/25 2014   TSH uIU/mL 2.820           Discharge Medications     Your medication list        START taking these medications        Instructions Last Dose Given Next Dose Due   atenolol 50 MG tablet  Commonly known as: TENORMIN      Take 1 tablet by mouth Every 12 (Twelve) Hours.       digoxin 250 MCG tablet  Commonly known as: LANOXIN      Take 1 tablet by mouth Daily.              CONTINUE taking these medications        Instructions Last Dose Given Next Dose Due   albuterol sulfate  (90 Base) MCG/ACT inhaler  Commonly known as: PROVENTIL HFA;VENTOLIN HFA;PROAIR HFA      Inhale 2 puffs Every 4 (Four) Hours As Needed for Wheezing.       furosemide 20 MG tablet  Commonly known as: LASIX      Take 1 tablet by mouth Daily for 30 days.       pantoprazole 40 MG EC tablet  Commonly known as: PROTONIX      TAKE 1 TABLET BY MOUTH 2 TIMES A DAY       potassium chloride 10 MEQ CR tablet      Take 1 tablet by mouth Daily.       Xarelto 20 MG tablet  Generic drug: rivaroxaban      Take 1 tablet by mouth Daily With Dinner for 30 days. Indications: Atrial Fibrillation              STOP taking these medications      metoprolol succinate XL 25 MG 24 hr tablet  Commonly known as: TOPROL-XL                  Where to Get Your Medications        These medications were sent to [place] [id number]      Hours: Monday to Friday 7 AM to 6 PM, Saturday & Sunday 8 AM to 4:30 PM (Closed 12 PM to 12:30 PM) Phone: [phone]   atenolol 50 MG tablet  digoxin 250 MCG tablet           Discharge Diet:   Dietary Orders (From admission, onward)       Start     Ordered    05/02/25 1529  Diet: Cardiac; Healthy Heart (2-3 Na+); Fluid Consistency: Thin (IDDSI 0)  Diet Effective Now        References:    Diet Order Definitions    Question Answer Comment   Diets: Cardiac    Cardiac Diet: Healthy Heart (2-3 Na+)    Fluid Consistency: Thin (IDDSI 0)        05/02/25 1528                        Discharge disposition: Home    Discharge condition: Stable and improved      Follow-up Appointments  Future Appointments   Date Time Provider Department Center   5/7/2025  9:45 AM Zehra Shen APRN [facility]   7/24/2025 10:15 AM Zehra Shen APRN [facility]      Follow-up Information       Zehra Shen APRN .    Specialties: Nurse Practitioner, Family Medicine  Contact information:  [address]  [address]  [place] [address]  [phone]               Jim Cornelius MD Follow up in 1 week(s).    Specialties: Cardiology, Cardiac Electrophysiology  Contact information:  [address]  [place] [id number] [phone]               Malorie Conti MD Follow up in 2 week(s).    Specialty: Cardiology  Why: With Dr. Conti or her APRN  Contact information:  [address]  [place] [address]  [phone]                               Test Results Pending at Discharge         Time:   I spent  35  minutes on this discharge activity which included: face-to-face encounter with the patient, reviewing the data in the system, coordination of the care with the nursing staff as well as consultants, documentation, and entering orders.          Ray Castellanos MD  [facility]  05/03/25  07:50 EDT     Normal

## 2025-05-03 NOTE — OUTREACH NOTE
Prep Survey      Flowsheet Row Responses   [facility] patient discharged from? [place]   Is LACE score < 7 ? No   Eligibility [facility]   Date of Admission 05/01/25   Date of Discharge 05/03/25   Discharge Disposition Home or Self Care   Discharge diagnosis Atrial fibrillation with RVR   Does the patient have one of the following disease processes/diagnoses(primary or secondary)? Other   Does the patient have Home health ordered? No   Is there a DME ordered? No   Prep survey completed? Yes            PARUL HEDRICK - Registered Nurse

## 2025-05-03 NOTE — PLAN OF CARE
Goal Outcome Evaluation:  Plan of Care Reviewed With: patient     Progress: improving    Outcome Evaluation: Pt s/p heart cath with no interventions. Right radial dressing CDI. Afib on the tele monitor with HR in the 70-80s overnight. VSS on room air. No complaints of chest pain. Will continue with plan of care.

## 2025-05-05 ENCOUNTER — TELEPHONE (OUTPATIENT)
Dept: CARDIOLOGY | Age: 55
End: 2025-05-05

## 2025-05-05 ENCOUNTER — TRANSITIONAL CARE MANAGEMENT TELEPHONE ENCOUNTER (OUTPATIENT)
Dept: CALL CENTER | Facility: HOSPITAL | Age: 55
End: 2025-05-05
Payer: COMMERCIAL

## 2025-05-05 NOTE — OUTREACH NOTE
Call Center TCM Note      Flowsheet Row Responses   [facility] patient discharged from? [place]   Does the patient have one of the following disease processes/diagnoses(primary or secondary)? Other   TCM attempt successful? Yes   Call start time 1042   Call end time 1046   Discharge diagnosis Atrial fibrillation with RVR   Person spoke with today (if not patient) and relationship Patient   Meds reviewed with patient/caregiver? Yes   Is the patient having any side effects they believe may be caused by any medication additions or changes? No   Does the patient have all medications ordered at discharge? Yes   Is the patient taking all medications as directed (includes completed medication regime)? Yes   Comments Hospital follow up with PCP 5/7 and on 5/8 with Cardiology.   Does the patient have an appointment with their PCP within 7-14 days of discharge? Yes   Psychosocial issues? No   Did the patient receive a copy of their discharge instructions? Yes   Nursing interventions Reviewed instructions with patient   What is the patient's perception of their health status since discharge? Improving   Is the patient/caregiver able to teach back signs and symptoms related to disease process for when to call PCP? Yes   Is the patient/caregiver able to teach back signs and symptoms related to disease process for when to call 911? Yes   Is the patient/caregiver able to teach back the hierarchy of who to call/visit for symptoms/problems? PCP, Specialist, Home health nurse, Urgent Care, ED, 911 Yes   If the patient is a current smoker, are they able to teach back resources for cessation? Not a smoker   TCM call completed? Yes   Wrap up additional comments Patient is doing well. HR has been mostly WNL since discharge. Taking medications and has followups scheduled.   Call end time 1046   Would this patient benefit from a Referral to [facility] Social Work? No   Is the patient interested in additional calls from an ambulatory case  manager? No            Henrry CAZARES - Registered Nurse    5/5/2025, 10:47 EDT

## 2025-05-05 NOTE — CASE MANAGEMENT/SOCIAL WORK
Case Management Discharge Note      Final Note: Pt discharged home.  No needs identified…....Morelia S/RN CM         Selected Continued Care - Discharged on 5/3/2025 Admission date: 5/1/2025 - Discharge disposition: Home or Self Care      Destination    No services have been selected for the patient.                Durable Medical Equipment    No services have been selected for the patient.                Dialysis/Infusion    No services have been selected for the patient.                Home Medical Care    No services have been selected for the patient.                Therapy    No services have been selected for the patient.                Community Resources    No services have been selected for the patient.                Community & DME    No services have been selected for the patient.                    Transportation Services  Private: Car    Final Discharge Disposition Code: 01 - home or self-care

## 2025-05-05 NOTE — TELEPHONE ENCOUNTER
Caller: Jaime Lyle Jr.    Relationship to patient: Self    Best call back number: [phone]    Chief complaint: N/A    Type of visit: HOSPITAL F/U    Requested date: WITHIN A WEEK     If rescheduling, when is the original appointment: N/A     Additional notes:PT WAS DISCHARGED FROM ER W/ 1 WEEK TIMEFRAME FOR F/U W/ DR PULIDO. NO AVAILABILITY IN TIMEFRAME. PLEASE CALL PT TO ADVISE.

## 2025-05-07 ENCOUNTER — OFFICE VISIT (OUTPATIENT)
Dept: FAMILY MEDICINE CLINIC | Facility: CLINIC | Age: 55
End: 2025-05-07
Payer: COMMERCIAL

## 2025-05-07 VITALS
WEIGHT: 315 LBS | HEIGHT: 72 IN | DIASTOLIC BLOOD PRESSURE: 74 MMHG | BODY MASS INDEX: 42.66 KG/M2 | SYSTOLIC BLOOD PRESSURE: 120 MMHG | HEART RATE: 58 BPM | OXYGEN SATURATION: 98 %

## 2025-05-07 DIAGNOSIS — I10 ESSENTIAL HYPERTENSION: ICD-10-CM

## 2025-05-07 DIAGNOSIS — I48.91 ATRIAL FIBRILLATION WITH RVR: Primary | ICD-10-CM

## 2025-05-07 DIAGNOSIS — R22.43 LOCALIZED SWELLING OF BOTH LOWER LEGS: ICD-10-CM

## 2025-05-07 RX ORDER — FUROSEMIDE 20 MG/1
20 TABLET ORAL DAILY
Qty: 30 TABLET | Refills: 2 | Status: SHIPPED | OUTPATIENT
Start: 2025-05-07 | End: 2025-06-06

## 2025-05-07 NOTE — PROGRESS NOTES
Date of Office Visit: 2025  Encounter Provider: RIKKI Mesa  Place of Service: [facility] CARDIOLOG[facility]  Patient Name: Jaime Lyle Jr.  :1970    Chief complaint  Hospital follow up    History of Present Illness  Patient is a 54 y.o. year old male  patient of Dr. Conti. Past medical history includes obstructive sleep reflux disease, obesity, chronic PVCs (negative stress tests in  and  and seen by Dr. Oconnell) and hypertension (recently started on Lasix lisinopril hydrochlorothiazide prior to last admission). He was admitted on 2025 with upper respiratory infection, systolic heart failure, and atrial fibrillation with rapid ventricular rate.  Echo had shown ejection fraction of 40 to 45% with no significant valvular heart disease.  Left atrial size was normal.  He was dismissed on metoprolol and Xarelto.  Lisinopril was switched to losartan.  Hydrochlorothiazide was discontinued due to borderline low blood pressure.   He states he was starting to feel well but then presented to the ED on 25 reporting a fib with RVR with a rate in the 130s.  He states he had actually walked in the morning without difficulty.  Just getting ready to walk again in the afternoon and he felt unwell with palpitations and shortness of breath.  He described having bilateral lateral discomfort lower chest, upper abdomen.  He had not had this at other times whatsoever.  The tachycardia persisted prompting him to come to the emergency room as previously instructed at dismissal.  He denies any increased caffeine intake stress or precipitating events.  He was treated in the emergency room with 2 doses of IV lopressor and IVdigoxin.  Overnight rates have improved blood pressure remains soft.  Troponin elevated but flat and similar to prior admission.  Lab values include troponin 32/34, unremarkable CBC and CMP.  He became claustrophobic during stress test and cardiac cath 25  showed mild, nonobstructive coronary disease with 20 to 30% segment stenosis throughout the proximal to distal LAD and normal LVEDP at 13 mmHg.    He was also seen by EP service and metoprolol was transitioned to atenolol 50 mg twice daily.  He was loaded with digoxin and started on 250 mcg p.o. daily.  It was recommended to continue rate control and if he remains symptomatic plan for HERMELINDO cardioversion.  He is also to follow-up in the EP clinic to discuss long-term treatment of atrial fibrillation.  He has appointment with RIKKI Sanderson on 5/21/2025.    Interval history  Patient presents today for hospital follow-up.  I will visit with him today and have reviewed his medical record.  His wife is present at appointment today per his preference.  Since discharge he has not been treating sleep apnea recently as since he has been in atrial fibrillation he has had a cough that disrupts the seal on his CPAP mask.  Prior to this he was using CPAP consistently.  He continues to have palpitations, shortness of breath, edema, and fatigue but this is improved from during his hospitalization.  Heart rates have been much better controlled and are typically in the 80s.  Blood pressure at home is usually better than in office today and has been 110-120/80.    Past Medical History:   Diagnosis Date    Acid reflux     Acute medial meniscus tear     left knee    Community acquired pneumonia     Mild mitral regurgitation     Obesity (BMI 35.0-39.9 without comorbidity)     STEVE (obstructive sleep apnea)     Pulmonic regurgitation     trace    PVC's (premature ventricular contractions)     Tricuspid regurgitation     trace     Past Surgical History:   Procedure Laterality Date    CARDIAC CATHETERIZATION N/A 5/2/2025    Procedure: Left Heart Cath;  Surgeon: Ray Castellanos MD;  Location: [facility] CATH INVASIVE LOC[facility];  Service: Cardiovascular;  Laterality: N/A;    LAPAROSCOPIC GASTRIC BANDING      ROTATOR CUFF REPAIR        Outpatient Medications Prior to Visit   Medication Sig Dispense Refill    albuterol sulfate  (90 Base) MCG/ACT inhaler Inhale 2 puffs Every 4 (Four) Hours As Needed for Wheezing. 8 g 11    atenolol (TENORMIN) 50 MG tablet Take 1 tablet by mouth Every 12 (Twelve) Hours. 60 tablet 3    digoxin (LANOXIN) 250 MCG tablet Take 1 tablet by mouth Daily. 30 tablet 1    furosemide (LASIX) 20 MG tablet Take 1 tablet by mouth Daily for 30 days. 30 tablet 2    pantoprazole (PROTONIX) 40 MG EC tablet TAKE 1 TABLET BY MOUTH 2 TIMES A  tablet 0    potassium chloride 10 MEQ CR tablet Take 1 tablet by mouth Daily. 30 tablet 2    rivaroxaban (XARELTO) 20 MG tablet Take 1 tablet by mouth Daily With Dinner for 30 days. Indications: Atrial Fibrillation 30 tablet 0     No facility-administered medications prior to visit.       Allergies as of 05/08/2025 - Reviewed 05/08/2025   Allergen Reaction Noted    Lisinopril-hydrochlorothiazide Cough 04/30/2025     Social History     Socioeconomic History    Marital status:    Tobacco Use    Smoking status: Light Smoker     Types: Cigars     Passive exposure: Current    Smokeless tobacco: Never    Tobacco comments:     1 cigar monthly   Vaping Use    Vaping status: Never Used   Substance and Sexual Activity    Alcohol use: Yes     Comment: caffeine use    Drug use: Never    Sexual activity: Defer     Family History   Problem Relation Age of Onset    Heart disease Mother 50    Diabetes Father     Sudden death Maternal Uncle 52    Stroke Maternal Grandmother 50    Diabetes Paternal Grandmother     Diabetes Paternal Grandfather     Hypertension Other     Heart disease Other         ischemic    Diabetes Other      Review of Systems   Constitutional: Positive for malaise/fatigue.   Cardiovascular:  Positive for dyspnea on exertion, leg swelling and palpitations. Negative for chest pain, claudication, near-syncope, orthopnea, paroxysmal nocturnal dyspnea and syncope.  "  Respiratory:  Negative for shortness of breath.    Neurological:  Negative for brief paralysis, dizziness, headaches and light-headedness.   All other systems reviewed and are negative.       Objective:     Vitals:    05/08/25 1448   BP: 118/98   BP Location: Left arm   Patient Position: Sitting   Cuff Size: Large Adult   Pulse: 82   Weight: (!) 143 kg (315 lb)   Height: 182.9 cm (72\")     Body mass index is 42.72 kg/m².    Vitals reviewed.   Constitutional:       General: Not in acute distress.     Appearance: Well-developed and not in distress. Not diaphoretic.   HENT:      Head: Normocephalic.   Pulmonary:      Effort: Pulmonary effort is normal. No respiratory distress.      Breath sounds: Normal breath sounds. No wheezing. No rhonchi. No rales.   Cardiovascular:      Normal rate. Irregularly irregular rhythm.      Murmurs: There is no murmur.   Pulses:     Radial: 2+ bilaterally.  Edema:     Peripheral edema absent.   Skin:     General: Skin is warm and dry. There is no cyanosis.      Findings: No rash.   Neurological:      Mental Status: Alert and oriented to person, place, and time.   Psychiatric:         Behavior: Behavior normal. Behavior is cooperative.         Thought Content: Thought content normal.         Judgment: Judgment normal.       Lab Review:     Lab Results   Component Value Date     05/03/2025     05/02/2025    K 4.4 05/03/2025    K 4.2 05/02/2025     05/03/2025     05/02/2025    CO2 23.3 05/03/2025    CO2 22.0 05/02/2025    BUN 19 05/03/2025    BUN 20 05/02/2025    CREATININE 1.05 05/03/2025    CREATININE 1.10 05/02/2025    EGFRIFNONA 71 06/08/2021    EGFRIFNONA 71 01/24/2020    EGFRIFAFRI 91 12/11/2017    EGFRIFAFRI 103 12/08/2016    GLUCOSE 95 05/03/2025    GLUCOSE 99 05/02/2025    CALCIUM 8.8 05/03/2025    CALCIUM 9.0 05/02/2025    ALBUMIN 4.0 05/01/2025    ALBUMIN 3.5 04/29/2025    BILITOT 0.7 05/01/2025    BILITOT 0.8 04/29/2025    AST 23 05/01/2025    AST 17 " 04/29/2025    ALT 30 05/01/2025    ALT 21 04/29/2025     Lab Results   Component Value Date    WBC 9.13 05/03/2025    WBC 9.42 05/02/2025    HGB 15.0 05/03/2025    HGB 15.3 05/02/2025    HCT 46.5 05/03/2025    HCT 48.3 05/02/2025    MCV 86.0 05/03/2025    MCV 87.7 05/02/2025     05/03/2025     05/02/2025     Lab Results   Component Value Date    PROBNP 1,768.0 (H) 04/30/2025    PROBNP 2,059.0 (H) 04/28/2025     Lab Results   Component Value Date    TROPONINT 34 (H) 05/02/2025     Lab Results   Component Value Date    TSH 2.820 04/28/2025    TSH 1.720 01/24/2025      Lipid Panel          1/24/2025    13:13   Lipid Panel   Total Cholesterol 205    Triglycerides 287    HDL Cholesterol 37    VLDL Cholesterol 50    LDL Cholesterol  118    LDL/HDL Ratio 2.99           ECG 12 Lead    Date/Time: 5/8/2025 4:06 PM  Performed by: Tabby Hinton APRN    Authorized by: Tabby Hinton APRN  Comparison: compared with previous ECG   Similar to previous ECG  Rhythm: atrial fibrillation  Rate: normal  BPM: 82  QRS axis: normal  Other findings: T wave abnormality  Comments: Similar to prior        Assessment:       Diagnosis Plan   1. Atrial fibrillation with RVR        2. PVC's (premature ventricular contractions)        3. Acute systolic heart failure        4. Essential hypertension        5. Mixed hyperlipidemia        6. STEVE (obstructive sleep apnea)          Plan:       1.  New onset atrial fibrillation.  Rates are much better controlled after the addition of digoxin.  He is tolerating Xarelto well with no abnormal bleeding.  He has appointment with EP service on 5/21/2025 to discuss rhythm control.  2.  NSVT and history of PVCs that improved with exercise.  Cardiac cath showed mild, nonobstructive coronary disease with 20 to 30% segment stenosis throughout the proximal to distal LAD.  Other coronary arteries were normal.  LVEDP was also normal.  3.  Acute systolic heart failure.  Felt to be due to atrial  fibrillation.  He has been intolerant of ACE inhibitor with cough.  Given need for atenolol for rate control blood pressure is too low to add ARB at this point.  4.  STEVE on CPAP  5.  Recent URI  6.  Dyslipidemia labs done in January 2025 showed , triglycerides 287, HDL 37.  Managed by PCP and not currently on statin.  Would recommend goal LDL less than 70, triglycerides less than 150, HDL greater than 50.      Time Spent: I spent 40 minutes caring for Jaime on this date of service. This time includes time spent by me in the following activities: preparing for the visit, reviewing tests, performing a medically appropriate examination and/or evaluations, counseling and educating the patient/family/caregiver, ordering medications, tests, or procedures, documenting information in the medical record, and independently interpreting results and communicating that information with the patient/family/caregiver.   I spent 1 minutes on the separately reported service of ECG. This time is not included in the time used to support the E/M service also reported today.        Your medication list            Accurate as of May 8, 2025  4:11 PM. If you have any questions, ask your nurse or doctor.                CONTINUE taking these medications        Instructions Last Dose Given Next Dose Due   albuterol sulfate  (90 Base) MCG/ACT inhaler  Commonly known as: PROVENTIL HFA;VENTOLIN HFA;PROAIR HFA      Inhale 2 puffs Every 4 (Four) Hours As Needed for Wheezing.       atenolol 50 MG tablet  Commonly known as: TENORMIN      Take 1 tablet by mouth Every 12 (Twelve) Hours.       digoxin 250 MCG tablet  Commonly known as: LANOXIN      Take 1 tablet by mouth Daily.       furosemide 20 MG tablet  Commonly known as: LASIX      Take 1 tablet by mouth Daily for 30 days.       pantoprazole 40 MG EC tablet  Commonly known as: PROTONIX      TAKE 1 TABLET BY MOUTH 2 TIMES A DAY       potassium chloride 10 MEQ CR tablet      Take 1  tablet by mouth Daily.       rivaroxaban 20 MG tablet  Commonly known as: XARELTO      Take 1 tablet by mouth Daily With Dinner for 30 days. Indications: Atrial Fibrillation                Patient is no longer taking -.  I corrected the med list to reflect this.  I did not stop these medications.    Return in about 6 months (around 11/8/2025) for with Dr. Conti.      Dictated utilizing Dragon dictation

## 2025-05-07 NOTE — PROGRESS NOTES
Transitional Care Follow Up Visit  Subjective     Jaime Lyle Jr. is a 54 y.o. male who presents for a transitional care management visit.    Within 48 business hours after discharge our office contacted him via telephone to coordinate his care and needs.      I reviewed and discussed the details of that call along with the discharge summary, hospital problems, inpatient lab results, inpatient diagnostic studies, and consultation reports with Jaime.     Current outpatient and discharge medications have been reconciled for the patient.  Reviewed by: RIKKI Bone          5/3/2025    12:15 PM   Date of TCM Phone Call   [facility]   Date of Admission 5/1/2025   Date of Discharge 5/3/2025   Discharge Disposition Home or Self Care     Risk for Readmission (LACE) Score: 7 (5/3/2025  6:00 AM)      History of Present Illness   Course During Hospital Stay:  Patient presented to [facility] with new onset atrial fibrillation with RVR x 2, first stay 4/28-4/30 and 5/1-5/3.     Hospital Course:    Patient initially presented on 4/28 with dizziness and dyspnea that started while working. - EKG on admission showed A-fib, high-sensitivity troponin mildly elevated but flat.  -Denied specific chest pain or palpitations.  -X-ray showed no focal pulmonary consolidation. Borderline heart size.  --BNP elevated at 2059  -TSH was up normal  - Was initiated on Cardizem drip, p.o. metoprolol, cardiology was consulted.  Xarelto was added for anticoagulation.  - Echocardiogram was obtained Left ventricular which showed ventricular ejection fraction appears to be 41 - 45%.  -Goal-directed medical therapy was limited due to soft blood pressure.  Patient was cleared to be discharged from cardiology standpoint with outpatient follow-up.  Patient was discharged home on metoprolol XL 27.5 mg daily.  Outpatient Lasix was continued.  Patient has follow-up with primary cardiologist next week.    Patient  home for less than 24 hours - again became symptomatic and returned to ER on 5/1 in atrial fibrillation with  RVR.      He was found to have a mildly reduced ejection fraction initial attempts were made to try and optimize his medical regimen to control his heart rate but came back with recurrent atrial fibrillation given the concerns about his cardiomyopathy initial plans were trying to do a stress test but due to his body habitus would be a 2-day stress test he had issues with claustrophobia with a stress test and thus was ultimately referred for heart catheterization. EP was also asked to see the patient there have been some adjustments made with his medical regimen to try and help out with his rate control after changing over to atenolol and digoxin heart rate is much better controlled plans are for further evaluation for more definitive treatment options given his cardiomyopathy and severe symptoms with atrial fibrillation. He was monitored overnight after his heart catheterization to ensure adequate heart rate control and fortunately is doing well this morning. Will discharge home with plans to follow-up with electrophysiology and cardiology.     Patient is taking Atenolol 50 mg BID, Digoxin 250 mcg daily and Xarelto 20 mg daily. Blood pressure is stable. Patient continues to have intermittent palpitations. He will see Cardiology on 5/8. Patient is taking Lasix 20 mg daily for lower extremity swelling.      The following portions of the patient's history were reviewed and updated as appropriate: allergies, current medications, past family history, past medical history, past social history, past surgical history, and problem list.    Review of Systems   Constitutional:  Negative for fever.   Respiratory:  Positive for cough. Negative for shortness of breath and wheezing.    Cardiovascular:  Positive for palpitations. Negative for chest pain and leg swelling.   Gastrointestinal:  Negative for constipation and  "diarrhea.   Neurological:  Negative for dizziness and headaches.   Psychiatric/Behavioral:  The patient is not nervous/anxious.        Objective   /74 (BP Location: Left arm, Patient Position: Sitting, Cuff Size: Large Adult)   Pulse 58   Ht 182.9 cm (72\")   Wt (!) 143 kg (315 lb)   SpO2 98%   BMI 42.72 kg/m²     Physical Exam  Constitutional:       Appearance: Normal appearance. He is obese.   HENT:      Head: Normocephalic.   Cardiovascular:      Rate and Rhythm: Bradycardia present. Rhythm irregular.   Pulmonary:      Effort: Pulmonary effort is normal.      Breath sounds: Normal breath sounds.   Abdominal:      General: Abdomen is flat. Bowel sounds are normal.      Palpations: Abdomen is soft.   Musculoskeletal:         General: Normal range of motion.      Cervical back: Neck supple.      Right lower leg: No edema.      Left lower leg: No edema.   Skin:     General: Skin is warm and dry.   Neurological:      Mental Status: He is alert and oriented to person, place, and time.      Gait: Gait is intact.   Psychiatric:         Attention and Perception: Attention normal.         Mood and Affect: Mood normal.         Speech: Speech normal.         Assessment & Plan   Diagnoses and all orders for this visit:    1. Atrial fibrillation with RVR (Primary)  Assessment & Plan:  Hospitalization notes reviewed  Continue current medication regimen  Patient will see Cardio tomorrow    Orders:  -     rivaroxaban (XARELTO) 20 MG tablet; Take 1 tablet by mouth Daily With Dinner for 30 days. Indications: Atrial Fibrillation  Dispense: 30 tablet; Refill: 0    2. Localized swelling of both lower legs  Assessment & Plan:  Stable on Lasix 20 mg daily    Orders:  -     furosemide (LASIX) 20 MG tablet; Take 1 tablet by mouth Daily for 30 days.  Dispense: 30 tablet; Refill: 2    3. Essential hypertension  Assessment & Plan:  Hypertension is stable and controlled  Continue current treatment regimen.  Regular aerobic " exercise.  Ambulatory blood pressure monitoring.  Blood pressure will be reassessed  in July as scheduled .

## 2025-05-07 NOTE — ASSESSMENT & PLAN NOTE
Hospitalization notes reviewed  Continue current medication regimen  Patient will see Cardio tomorrow

## 2025-05-07 NOTE — ASSESSMENT & PLAN NOTE
Hypertension is stable and controlled  Continue current treatment regimen.  Regular aerobic exercise.  Ambulatory blood pressure monitoring.  Blood pressure will be reassessed  in July as scheduled .

## 2025-05-08 ENCOUNTER — OFFICE VISIT (OUTPATIENT)
Dept: CARDIOLOGY | Age: 55
End: 2025-05-08
Payer: COMMERCIAL

## 2025-05-08 VITALS
WEIGHT: 315 LBS | HEIGHT: 72 IN | HEART RATE: 82 BPM | BODY MASS INDEX: 42.66 KG/M2 | DIASTOLIC BLOOD PRESSURE: 98 MMHG | SYSTOLIC BLOOD PRESSURE: 118 MMHG

## 2025-05-08 DIAGNOSIS — I50.21 ACUTE SYSTOLIC HEART FAILURE: ICD-10-CM

## 2025-05-08 DIAGNOSIS — I10 ESSENTIAL HYPERTENSION: ICD-10-CM

## 2025-05-08 DIAGNOSIS — G47.33 OSA (OBSTRUCTIVE SLEEP APNEA): ICD-10-CM

## 2025-05-08 DIAGNOSIS — E78.2 MIXED HYPERLIPIDEMIA: ICD-10-CM

## 2025-05-08 DIAGNOSIS — I48.91 ATRIAL FIBRILLATION WITH RVR: Primary | ICD-10-CM

## 2025-05-08 DIAGNOSIS — I49.3 PVC'S (PREMATURE VENTRICULAR CONTRACTIONS): ICD-10-CM

## 2025-05-09 ENCOUNTER — TELEPHONE (OUTPATIENT)
Age: 55
End: 2025-05-09
Payer: COMMERCIAL

## 2025-05-09 NOTE — TELEPHONE ENCOUNTER
Patient is scheduled for a hosp f/u appt with you on 5/21.    He left a voicemail asking if he is going to have a cardioversion after his appt.    Is this something you all have discussed with the patient?

## 2025-05-15 ENCOUNTER — READMISSION MANAGEMENT (OUTPATIENT)
Dept: CALL CENTER | Facility: HOSPITAL | Age: 55
End: 2025-05-15
Payer: COMMERCIAL

## 2025-05-15 NOTE — OUTREACH NOTE
Medical Week 2 Survey      Flowsheet Row Responses   [facility] patient discharged from? [place]   Does the patient have one of the following disease processes/diagnoses(primary or secondary)? Other   Week 2 attempt successful? No   Unsuccessful attempts Attempt 1   oke Yanick Marinelli Registered Nurse

## 2025-05-18 ENCOUNTER — E-VISIT (OUTPATIENT)
Dept: ADMINISTRATIVE | Facility: OTHER | Age: 55
End: 2025-05-18
Payer: COMMERCIAL

## 2025-05-18 ENCOUNTER — APPOINTMENT (OUTPATIENT)
Dept: GENERAL RADIOLOGY | Facility: HOSPITAL | Age: 55
End: 2025-05-18
Payer: COMMERCIAL

## 2025-05-18 ENCOUNTER — HOSPITAL ENCOUNTER (OUTPATIENT)
Facility: HOSPITAL | Age: 55
Setting detail: OBSERVATION
Discharge: HOME | End: 2025-05-19
Attending: EMERGENCY MEDICINE | Admitting: EMERGENCY MEDICINE
Payer: COMMERCIAL

## 2025-05-18 DIAGNOSIS — R06.09 DYSPNEA ON EXERTION: ICD-10-CM

## 2025-05-18 DIAGNOSIS — I48.91 ATRIAL FIBRILLATION, UNSPECIFIED TYPE: Primary | ICD-10-CM

## 2025-05-18 DIAGNOSIS — E87.79 OTHER HYPERVOLEMIA: ICD-10-CM

## 2025-05-18 DIAGNOSIS — I48.91 ATRIAL FIBRILLATION WITH RVR: ICD-10-CM

## 2025-05-18 DIAGNOSIS — R06.01 ORTHOPNEA: ICD-10-CM

## 2025-05-18 DIAGNOSIS — R22.43 LOCALIZED SWELLING OF BOTH LOWER LEGS: ICD-10-CM

## 2025-05-18 LAB
ALBUMIN SERPL-MCNC: 3.8 G/DL (ref 3.5–5.2)
ALBUMIN/GLOB SERPL: 1.3 G/DL
ALP SERPL-CCNC: 65 U/L (ref 39–117)
ALT SERPL W P-5'-P-CCNC: 25 U/L (ref 1–41)
ANION GAP SERPL CALCULATED.3IONS-SCNC: 12 MMOL/L (ref 5–15)
AST SERPL-CCNC: 17 U/L (ref 1–40)
BASOPHILS # BLD AUTO: 0.04 10*3/MM3 (ref 0–0.2)
BASOPHILS NFR BLD AUTO: 0.4 % (ref 0–1.5)
BILIRUB SERPL-MCNC: 0.5 MG/DL (ref 0–1.2)
BUN SERPL-MCNC: 15 MG/DL (ref 6–20)
BUN/CREAT SERPL: 13.8 (ref 7–25)
CALCIUM SPEC-SCNC: 9.6 MG/DL (ref 8.6–10.5)
CHLORIDE SERPL-SCNC: 105 MMOL/L (ref 98–107)
CO2 SERPL-SCNC: 23 MMOL/L (ref 22–29)
CREAT SERPL-MCNC: 1.09 MG/DL (ref 0.76–1.27)
DEPRECATED RDW RBC AUTO: 44 FL (ref 37–54)
EGFRCR SERPLBLD CKD-EPI 2021: 79.7 ML/MIN/1.73
EOSINOPHIL # BLD AUTO: 0.2 10*3/MM3 (ref 0–0.4)
EOSINOPHIL NFR BLD AUTO: 1.9 % (ref 0.3–6.2)
ERYTHROCYTE [DISTWIDTH] IN BLOOD BY AUTOMATED COUNT: 13.7 % (ref 12.3–15.4)
GLOBULIN UR ELPH-MCNC: 3 GM/DL
GLUCOSE SERPL-MCNC: 105 MG/DL (ref 65–99)
HCT VFR BLD AUTO: 49.7 % (ref 37.5–51)
HGB BLD-MCNC: 15.8 G/DL (ref 13–17.7)
HOLD SPECIMEN: NORMAL
HOLD SPECIMEN: NORMAL
IMM GRANULOCYTES # BLD AUTO: 0.07 10*3/MM3 (ref 0–0.05)
IMM GRANULOCYTES NFR BLD AUTO: 0.7 % (ref 0–0.5)
LYMPHOCYTES # BLD AUTO: 3.09 10*3/MM3 (ref 0.7–3.1)
LYMPHOCYTES NFR BLD AUTO: 29.6 % (ref 19.6–45.3)
MCH RBC QN AUTO: 27.6 PG (ref 26.6–33)
MCHC RBC AUTO-ENTMCNC: 31.8 G/DL (ref 31.5–35.7)
MCV RBC AUTO: 86.7 FL (ref 79–97)
MONOCYTES # BLD AUTO: 0.84 10*3/MM3 (ref 0.1–0.9)
MONOCYTES NFR BLD AUTO: 8.1 % (ref 5–12)
NEUTROPHILS NFR BLD AUTO: 59.3 % (ref 42.7–76)
NEUTROPHILS NFR BLD AUTO: 6.19 10*3/MM3 (ref 1.7–7)
NRBC BLD AUTO-RTO: 0 /100 WBC (ref 0–0.2)
NT-PROBNP SERPL-MCNC: 1443 PG/ML (ref 0–900)
PLATELET # BLD AUTO: 253 10*3/MM3 (ref 140–450)
PMV BLD AUTO: 9.2 FL (ref 6–12)
POTASSIUM SERPL-SCNC: 4.1 MMOL/L (ref 3.5–5.2)
PROT SERPL-MCNC: 6.8 G/DL (ref 6–8.5)
RBC # BLD AUTO: 5.73 10*6/MM3 (ref 4.14–5.8)
SODIUM SERPL-SCNC: 140 MMOL/L (ref 136–145)
TROPONIN T SERPL HS-MCNC: 27 NG/L
WBC NRBC COR # BLD AUTO: 10.43 10*3/MM3 (ref 3.4–10.8)
WHOLE BLOOD HOLD COAG: NORMAL
WHOLE BLOOD HOLD SPECIMEN: NORMAL

## 2025-05-18 PROCEDURE — 99285 EMERGENCY DEPT VISIT HI MDM: CPT

## 2025-05-18 PROCEDURE — 80162 ASSAY OF DIGOXIN TOTAL: CPT | Performed by: EMERGENCY MEDICINE

## 2025-05-18 PROCEDURE — 83880 ASSAY OF NATRIURETIC PEPTIDE: CPT | Performed by: EMERGENCY MEDICINE

## 2025-05-18 PROCEDURE — 93005 ELECTROCARDIOGRAM TRACING: CPT

## 2025-05-18 PROCEDURE — 84484 ASSAY OF TROPONIN QUANT: CPT | Performed by: EMERGENCY MEDICINE

## 2025-05-18 PROCEDURE — 85025 COMPLETE CBC W/AUTO DIFF WBC: CPT | Performed by: EMERGENCY MEDICINE

## 2025-05-18 PROCEDURE — 71045 X-RAY EXAM CHEST 1 VIEW: CPT

## 2025-05-18 PROCEDURE — 93005 ELECTROCARDIOGRAM TRACING: CPT | Performed by: EMERGENCY MEDICINE

## 2025-05-18 PROCEDURE — 80053 COMPREHEN METABOLIC PANEL: CPT | Performed by: EMERGENCY MEDICINE

## 2025-05-18 RX ORDER — SODIUM CHLORIDE 0.9 % (FLUSH) 0.9 %
10 SYRINGE (ML) INJECTION AS NEEDED
Status: DISCONTINUED | OUTPATIENT
Start: 2025-05-18 | End: 2025-05-19 | Stop reason: HOSPADM

## 2025-05-18 RX ORDER — FUROSEMIDE 10 MG/ML
80 INJECTION INTRAMUSCULAR; INTRAVENOUS ONCE
Status: COMPLETED | OUTPATIENT
Start: 2025-05-19 | End: 2025-05-19

## 2025-05-18 NOTE — E-VISIT ESCALATED
Status: Referred Out  Date: 2025 10:49:10  Acuity Level: Not applicable  Referral message: Based on the information you provided during your interview, eVisit is not appropriate for treating your condition.  Patient: Jaime Lyle  Patient : 1970  Patient Address: [address] [place] [address]  Patient Phone: [phone]  Clinician Response: Unavailable  Diagnosis: Unavailable  Diagnosis ICD: Unavailable     Patient Interview Questions and Responses: None available

## 2025-05-19 ENCOUNTER — APPOINTMENT (OUTPATIENT)
Dept: CARDIOLOGY | Facility: HOSPITAL | Age: 55
End: 2025-05-19
Payer: COMMERCIAL

## 2025-05-19 ENCOUNTER — READMISSION MANAGEMENT (OUTPATIENT)
Dept: CALL CENTER | Facility: HOSPITAL | Age: 55
End: 2025-05-19
Payer: COMMERCIAL

## 2025-05-19 VITALS
DIASTOLIC BLOOD PRESSURE: 79 MMHG | TEMPERATURE: 97.5 F | BODY MASS INDEX: 42.61 KG/M2 | OXYGEN SATURATION: 94 % | RESPIRATION RATE: 18 BRPM | HEIGHT: 72 IN | WEIGHT: 314.6 LBS | SYSTOLIC BLOOD PRESSURE: 121 MMHG | HEART RATE: 52 BPM

## 2025-05-19 PROBLEM — R06.02 SHORTNESS OF BREATH: Status: ACTIVE | Noted: 2025-05-19

## 2025-05-19 LAB
ANION GAP SERPL CALCULATED.3IONS-SCNC: 12 MMOL/L (ref 5–15)
BUN SERPL-MCNC: 18 MG/DL (ref 6–20)
BUN/CREAT SERPL: 15.8 (ref 7–25)
CALCIUM SPEC-SCNC: 10.4 MG/DL (ref 8.6–10.5)
CHLORIDE SERPL-SCNC: 100 MMOL/L (ref 98–107)
CO2 SERPL-SCNC: 29 MMOL/L (ref 22–29)
CREAT SERPL-MCNC: 1.14 MG/DL (ref 0.76–1.27)
DEPRECATED RDW RBC AUTO: 43 FL (ref 37–54)
DIGOXIN SERPL-MCNC: 1 NG/ML (ref 0.6–1.2)
EGFRCR SERPLBLD CKD-EPI 2021: 75.5 ML/MIN/1.73
ERYTHROCYTE [DISTWIDTH] IN BLOOD BY AUTOMATED COUNT: 13.7 % (ref 12.3–15.4)
GEN 5 1HR TROPONIN T REFLEX: 25 NG/L
GLUCOSE SERPL-MCNC: 105 MG/DL (ref 65–99)
HCT VFR BLD AUTO: 51.6 % (ref 37.5–51)
HGB BLD-MCNC: 16.6 G/DL (ref 13–17.7)
MCH RBC QN AUTO: 27.5 PG (ref 26.6–33)
MCHC RBC AUTO-ENTMCNC: 32.2 G/DL (ref 31.5–35.7)
MCV RBC AUTO: 85.6 FL (ref 79–97)
PLATELET # BLD AUTO: 271 10*3/MM3 (ref 140–450)
PMV BLD AUTO: 9.6 FL (ref 6–12)
POTASSIUM SERPL-SCNC: 4 MMOL/L (ref 3.5–5.2)
QT INTERVAL: 362 MS
QT INTERVAL: 378 MS
QTC INTERVAL: 423 MS
QTC INTERVAL: 444 MS
RBC # BLD AUTO: 6.03 10*6/MM3 (ref 4.14–5.8)
SODIUM SERPL-SCNC: 141 MMOL/L (ref 136–145)
TROPONIN T % DELTA: -7
TROPONIN T NUMERIC DELTA: -2 NG/L
WBC NRBC COR # BLD AUTO: 9.38 10*3/MM3 (ref 3.4–10.8)

## 2025-05-19 PROCEDURE — G0378 HOSPITAL OBSERVATION PER HR: HCPCS

## 2025-05-19 PROCEDURE — 93005 ELECTROCARDIOGRAM TRACING: CPT | Performed by: STUDENT IN AN ORGANIZED HEALTH CARE EDUCATION/TRAINING PROGRAM

## 2025-05-19 PROCEDURE — 84484 ASSAY OF TROPONIN QUANT: CPT | Performed by: EMERGENCY MEDICINE

## 2025-05-19 PROCEDURE — 80048 BASIC METABOLIC PNL TOTAL CA: CPT | Performed by: STUDENT IN AN ORGANIZED HEALTH CARE EDUCATION/TRAINING PROGRAM

## 2025-05-19 PROCEDURE — 92960 CARDIOVERSION ELECTRIC EXT: CPT

## 2025-05-19 PROCEDURE — 85027 COMPLETE CBC AUTOMATED: CPT | Performed by: STUDENT IN AN ORGANIZED HEALTH CARE EDUCATION/TRAINING PROGRAM

## 2025-05-19 PROCEDURE — 96374 THER/PROPH/DIAG INJ IV PUSH: CPT

## 2025-05-19 PROCEDURE — 25010000002 FUROSEMIDE PER 20 MG: Performed by: EMERGENCY MEDICINE

## 2025-05-19 PROCEDURE — 36415 COLL VENOUS BLD VENIPUNCTURE: CPT

## 2025-05-19 RX ORDER — SODIUM CHLORIDE 0.9 % (FLUSH) 0.9 %
10 SYRINGE (ML) INJECTION EVERY 12 HOURS SCHEDULED
Status: DISCONTINUED | OUTPATIENT
Start: 2025-05-19 | End: 2025-05-19 | Stop reason: HOSPADM

## 2025-05-19 RX ORDER — SODIUM CHLORIDE 9 MG/ML
40 INJECTION, SOLUTION INTRAVENOUS AS NEEDED
Status: DISCONTINUED | OUTPATIENT
Start: 2025-05-19 | End: 2025-05-19 | Stop reason: HOSPADM

## 2025-05-19 RX ORDER — ATENOLOL 25 MG/1
25 TABLET ORAL EVERY 12 HOURS SCHEDULED
Qty: 60 TABLET | Refills: 0 | Status: SHIPPED | OUTPATIENT
Start: 2025-05-19 | End: 2025-06-18

## 2025-05-19 RX ORDER — ONDANSETRON 2 MG/ML
4 INJECTION INTRAMUSCULAR; INTRAVENOUS EVERY 6 HOURS PRN
Status: DISCONTINUED | OUTPATIENT
Start: 2025-05-19 | End: 2025-05-19 | Stop reason: HOSPADM

## 2025-05-19 RX ORDER — METHOHEXITAL IN WATER/PF 100MG/10ML
SYRINGE (ML) INTRAVENOUS
Status: COMPLETED | OUTPATIENT
Start: 2025-05-19 | End: 2025-05-19

## 2025-05-19 RX ORDER — ATENOLOL 50 MG/1
50 TABLET ORAL EVERY 12 HOURS SCHEDULED
Status: DISCONTINUED | OUTPATIENT
Start: 2025-05-19 | End: 2025-05-19

## 2025-05-19 RX ORDER — PANTOPRAZOLE SODIUM 40 MG/1
40 TABLET, DELAYED RELEASE ORAL 2 TIMES DAILY
Status: DISCONTINUED | OUTPATIENT
Start: 2025-05-19 | End: 2025-05-19 | Stop reason: HOSPADM

## 2025-05-19 RX ORDER — FUROSEMIDE 20 MG/1
20 TABLET ORAL DAILY
Qty: 30 TABLET | Refills: 2 | Status: SHIPPED | OUTPATIENT
Start: 2025-05-19 | End: 2025-06-18

## 2025-05-19 RX ORDER — ALBUTEROL SULFATE 0.83 MG/ML
2.5 SOLUTION RESPIRATORY (INHALATION) EVERY 6 HOURS PRN
Status: DISCONTINUED | OUTPATIENT
Start: 2025-05-19 | End: 2025-05-19 | Stop reason: HOSPADM

## 2025-05-19 RX ORDER — FUROSEMIDE 20 MG/1
20 TABLET ORAL DAILY
Qty: 30 TABLET | Refills: 2 | Status: SHIPPED | OUTPATIENT
Start: 2025-05-19 | End: 2025-05-19

## 2025-05-19 RX ORDER — POTASSIUM CHLORIDE 750 MG/1
10 TABLET, FILM COATED, EXTENDED RELEASE ORAL DAILY
Status: DISCONTINUED | OUTPATIENT
Start: 2025-05-19 | End: 2025-05-19 | Stop reason: HOSPADM

## 2025-05-19 RX ORDER — NITROGLYCERIN 0.4 MG/1
0.4 TABLET SUBLINGUAL
Status: DISCONTINUED | OUTPATIENT
Start: 2025-05-19 | End: 2025-05-19 | Stop reason: HOSPADM

## 2025-05-19 RX ORDER — ATENOLOL 50 MG/1
25 TABLET ORAL EVERY 12 HOURS SCHEDULED
Status: DISCONTINUED | OUTPATIENT
Start: 2025-05-19 | End: 2025-05-19 | Stop reason: HOSPADM

## 2025-05-19 RX ORDER — ACETAMINOPHEN 325 MG/1
650 TABLET ORAL EVERY 4 HOURS PRN
Status: DISCONTINUED | OUTPATIENT
Start: 2025-05-19 | End: 2025-05-19 | Stop reason: HOSPADM

## 2025-05-19 RX ORDER — FUROSEMIDE 20 MG/1
20 TABLET ORAL DAILY
Status: DISCONTINUED | OUTPATIENT
Start: 2025-05-19 | End: 2025-05-19 | Stop reason: HOSPADM

## 2025-05-19 RX ORDER — ATENOLOL 25 MG/1
25 TABLET ORAL EVERY 12 HOURS SCHEDULED
Qty: 60 TABLET | Refills: 0 | Status: SHIPPED | OUTPATIENT
Start: 2025-05-19 | End: 2025-05-19

## 2025-05-19 RX ORDER — ACETAMINOPHEN 650 MG/1
650 SUPPOSITORY RECTAL EVERY 4 HOURS PRN
Status: DISCONTINUED | OUTPATIENT
Start: 2025-05-19 | End: 2025-05-19 | Stop reason: HOSPADM

## 2025-05-19 RX ORDER — HYDROCHLOROTHIAZIDE 25 MG/1
25 TABLET ORAL DAILY
Qty: 30 TABLET | Refills: 1 | OUTPATIENT
Start: 2025-05-19

## 2025-05-19 RX ORDER — ONDANSETRON 4 MG/1
4 TABLET, ORALLY DISINTEGRATING ORAL EVERY 6 HOURS PRN
Status: DISCONTINUED | OUTPATIENT
Start: 2025-05-19 | End: 2025-05-19 | Stop reason: HOSPADM

## 2025-05-19 RX ORDER — DIGOXIN 125 MCG
250 TABLET ORAL
Status: DISCONTINUED | OUTPATIENT
Start: 2025-05-19 | End: 2025-05-19

## 2025-05-19 RX ORDER — SODIUM CHLORIDE 0.9 % (FLUSH) 0.9 %
10 SYRINGE (ML) INJECTION AS NEEDED
Status: DISCONTINUED | OUTPATIENT
Start: 2025-05-19 | End: 2025-05-19 | Stop reason: HOSPADM

## 2025-05-19 RX ORDER — ACETAMINOPHEN 160 MG/5ML
650 SOLUTION ORAL EVERY 4 HOURS PRN
Status: DISCONTINUED | OUTPATIENT
Start: 2025-05-19 | End: 2025-05-19 | Stop reason: HOSPADM

## 2025-05-19 RX ADMIN — ATENOLOL 50 MG: 50 TABLET ORAL at 08:19

## 2025-05-19 RX ADMIN — Medication 60 MG: at 10:40

## 2025-05-19 RX ADMIN — DIGOXIN 250 MCG: 0.12 TABLET ORAL at 11:45

## 2025-05-19 RX ADMIN — Medication 10 ML: at 08:19

## 2025-05-19 RX ADMIN — Medication 10 ML: at 01:31

## 2025-05-19 RX ADMIN — PANTOPRAZOLE SODIUM 40 MG: 40 TABLET, DELAYED RELEASE ORAL at 08:19

## 2025-05-19 RX ADMIN — FUROSEMIDE 80 MG: 10 INJECTION, SOLUTION INTRAMUSCULAR; INTRAVENOUS at 00:36

## 2025-05-19 RX ADMIN — POTASSIUM CHLORIDE 10 MEQ: 750 TABLET, EXTENDED RELEASE ORAL at 08:19

## 2025-05-19 RX ADMIN — FUROSEMIDE 20 MG: 20 TABLET ORAL at 08:19

## 2025-05-19 NOTE — PLAN OF CARE
Goal Outcome Evaluation:      Pt admitted to obs for shortness of breath. He says he is only short of breath with activity. He also has a productive cough. A&O x4. RA. Afib w/ PVC's. NPO since MN. Urinal used. Up ad luis a. Strict I&O. Cardioversion later today. Cardiology consulted. Wife @ bedside.

## 2025-05-19 NOTE — OUTREACH NOTE
Prep Survey      Flowsheet Row Responses   [facility] patient discharged from? [place]   Is LACE score < 7 ? No   Eligibility [facility]   Date of Admission 05/18/25   Date of Discharge 05/19/25   Discharge diagnosis Shortness of breath   Does the patient have one of the following disease processes/diagnoses(primary or secondary)? Other   Prep survey completed? Yes            Polly AVILA - Registered Nurse

## 2025-05-19 NOTE — ED PROVIDER NOTES
EMERGENCY DEPARTMENT ENCOUNTER    History  Chief Complaint   Patient presents with    Shortness of Breath       History provided by: Patient and Spouse    HPI:  Context: Jaime Lyle Jr. is a 54 y.o. male with a medical history of PVCs, hypertension, obesity, atrial fibrillation, STEVE, heart failure who presents to the ED c/o acute shortness of breath.  Patient has been dealing with atrial fibrillation over the past few weeks.  He has been admitted to the hospital and seen cardiology as an outpatient.  He is currently maintained on Lasix, atenolol, digoxin and Xarelto.  He has had progressively worsening shortness of breath.  It is seemed significantly worse over the past 3 days.  He notes significant orthopnea, dyspnea on exertion and continues to have lower extremity swelling despite compliance with medications.      Past Medical History:  Active Ambulatory Problems     Diagnosis Date Noted    PVC's (premature ventricular contractions) 01/09/2018    Obesity (BMI 35.0-39.9 without comorbidity)     Cigar smoker 08/20/2018    Palpitations 08/20/2018    Tobacco use 06/08/2021    Essential hypertension 06/08/2021    Localized edema 06/08/2021    Preventative health care 06/08/2021    Bradycardia 06/08/2021    Screening for colon cancer 06/08/2021    Screening for malignant neoplasm of prostate 06/08/2021    Hyperglycemia 06/08/2021    Frequent PVCs 06/08/2021    Gastroesophageal reflux disease 09/06/2022    Bronchitis 12/29/2022    Need for vaccination against Streptococcus pneumoniae 01/10/2024    Personal history of gastric banding 01/10/2024    Class 3 severe obesity with serious comorbidity and body mass index (BMI) of 40.0 to 44.9 in adult 01/10/2024    Screening for malignant neoplasm of colon 01/10/2024    Annual physical exam 01/11/2024    Chronic pain of left knee 01/11/2024    Vitamin D deficiency 09/11/2024    Chronic fatigue 09/11/2024    Snoring 09/11/2024    STEVE (obstructive sleep apnea) 01/24/2025     Atrial fibrillation with RVR 04/28/2025    Acute systolic heart failure 04/30/2025    Localized swelling of both lower legs 05/07/2025     Resolved Ambulatory Problems     Diagnosis Date Noted    No Resolved Ambulatory Problems     Past Medical History:   Diagnosis Date    Acid reflux     Acute medial meniscus tear     Community acquired pneumonia     Mild mitral regurgitation     Pulmonic regurgitation     Tricuspid regurgitation        Past Surgical History:  Past Surgical History:   Procedure Laterality Date    CARDIAC CATHETERIZATION N/A 5/2/2025    Procedure: Left Heart Cath;  Surgeon: Ray Castellanos MD;  Location: [facility] CATH INVASIVE LOCATION;  Service: Cardiovascular;  Laterality: N/A;    LAPAROSCOPIC GASTRIC BANDING      ROTATOR CUFF REPAIR           Family History:  Family History   Problem Relation Age of Onset    Heart disease Mother 50    Diabetes Father     Sudden death Maternal Uncle 52    Stroke Maternal Grandmother 50    Diabetes Paternal Grandmother     Diabetes Paternal Grandfather     Hypertension Other     Heart disease Other         ischemic    Diabetes Other          Social History:  Social History     Socioeconomic History    Marital status:    Tobacco Use    Smoking status: Light Smoker     Types: Cigars     Passive exposure: Current    Smokeless tobacco: Never    Tobacco comments:     1 cigar monthly   Vaping Use    Vaping status: Never Used   Substance and Sexual Activity    Alcohol use: Yes     Comment: caffeine use    Drug use: Never    Sexual activity: Defer         Allergies:  Lisinopril-hydrochlorothiazide        Physical Exam  ED Triage Vitals   Temp Heart Rate Resp BP SpO2   05/18/25 2209 05/18/25 2209 05/18/25 2209 05/18/25 2218 05/18/25 2209   97.1 °F (36.2 °C) 91 20 117/85 94 %      Temp src Heart Rate Source Patient Position BP Location FiO2 (%)   05/18/25 2209 05/18/25 2209 -- 05/18/25 2218 --   Tympanic Monitor  Left arm      Physical Exam  Constitutional:        Appearance: Normal appearance.   HENT:      Head: Atraumatic.   Eyes:      Conjunctiva/sclera: Conjunctivae normal.   Cardiovascular:      Rate and Rhythm: Normal rate. Rhythm irregular.      Heart sounds: No murmur heard.  Pulmonary:      Effort: Pulmonary effort is normal. No respiratory distress.      Comments: Bibasilar crackles  Abdominal:      Tenderness: There is no abdominal tenderness. There is no guarding or rebound.   Musculoskeletal:      Right lower leg: Edema present.      Left lower leg: Edema present.   Skin:     Capillary Refill: Capillary refill takes less than 2 seconds.   Neurological:      Mental Status: He is alert and oriented to person, place, and time.           Medications Given in ER:   Medications   sodium chloride 0.9 % flush 10 mL (has no administration in time range)   furosemide (LASIX) injection 80 mg (has no administration in time range)         Orders Placed:  Orders Placed This Encounter   Procedures    XR Chest 1 View    [place] Draw    Comprehensive Metabolic Panel    BNP    High Sensitivity Troponin T    CBC Auto Differential    High Sensitivity Troponin T 1Hr    Digoxin Level    NPO Diet NPO Type: Strict NPO    Undress & Gown    Continuous Pulse Oximetry    Vital Signs    Cardiology (on-call MD unless specified)    Oxygen Therapy- Nasal Cannula; Titrate 1-6 LPM Per SpO2; 90 - 95%    ECG 12 Lead ED Triage Standing Order; SOA    Insert Peripheral IV    CBC & Differential    Green Top (Gel)    Lavender Top    Gold Top - SST    Light Blue Top         Outpatient Medication Management:   Current Facility-Administered Medications Ordered in Epic   Medication Dose Route Frequency Provider Last Rate Last Admin    furosemide (LASIX) injection 80 mg  80 mg Intravenous Once Karine Upton MD        sodium chloride 0.9 % flush 10 mL  10 mL Intravenous PRN Cricket Carl MD         Current Outpatient Medications Ordered in Epic   Medication Sig Dispense Refill    albuterol sulfate   (90 Base) MCG/ACT inhaler Inhale 2 puffs Every 4 (Four) Hours As Needed for Wheezing. 8 g 11    atenolol (TENORMIN) 50 MG tablet Take 1 tablet by mouth Every 12 (Twelve) Hours. 60 tablet 3    digoxin (LANOXIN) 250 MCG tablet Take 1 tablet by mouth Daily. 30 tablet 1    furosemide (LASIX) 20 MG tablet Take 1 tablet by mouth Daily for 30 days. 30 tablet 2    pantoprazole (PROTONIX) 40 MG EC tablet TAKE 1 TABLET BY MOUTH 2 TIMES A  tablet 0    potassium chloride 10 MEQ CR tablet Take 1 tablet by mouth Daily. 30 tablet 2    rivaroxaban (XARELTO) 20 MG tablet Take 1 tablet by mouth Daily With Dinner for 30 days. Indications: Atrial Fibrillation 30 tablet 0           Medical Decision Making:  All labs have been independently interpreted by me.  All radiology studies have been reviewed by me. All EKG's have been independently viewed and interpreted by me.  Discussion below represents my analysis of pertinent findings related to patient's condition, differential diagnosis, treatment plan and final disposition.    Differential Diagnosis includes but not limited to: Atrial fibrillation with rapid ventricular rate, CHF, pulmonary edema, pleural effusion, pneumonia    Independent Historian Required Due To: Contributes additional history    Review of prior external notes (non-ED) -and- Review of prior external test results outside of this encounter:  Reviewed the discharge summary of hospitalization 5/1/2025.  Patient was admitted for atrial fibrillation with rapid ventricular rate.  Did have a left heart catheterization which demonstrated nonobstructive disease and rate control was recommended.  He is on atenolol and digoxin    Labs Results:  Recent Results (from the past 24 hours)   ECG 12 Lead ED Triage Standing Order; SOA    Collection Time: 05/18/25 10:14 PM   Result Value Ref Range    QT Interval 362 ms    QTC Interval 423 ms   Comprehensive Metabolic Panel    Collection Time: 05/18/25 11:02 PM    Specimen:  Arm, Left; Blood   Result Value Ref Range    Glucose 105 (H) 65 - 99 mg/dL    BUN 15 6 - 20 mg/dL    Creatinine 1.09 0.76 - 1.27 mg/dL    Sodium 140 136 - 145 mmol/L    Potassium 4.1 3.5 - 5.2 mmol/L    Chloride 105 98 - 107 mmol/L    CO2 23.0 22.0 - 29.0 mmol/L    Calcium 9.6 8.6 - 10.5 mg/dL    Total Protein 6.8 6.0 - 8.5 g/dL    Albumin 3.8 3.5 - 5.2 g/dL    ALT (SGPT) 25 1 - 41 U/L    AST (SGOT) 17 1 - 40 U/L    Alkaline Phosphatase 65 39 - 117 U/L    Total Bilirubin 0.5 0.0 - 1.2 mg/dL    Globulin 3.0 gm/dL    A/G Ratio 1.3 g/dL    BUN/Creatinine Ratio 13.8 7.0 - 25.0    Anion Gap 12.0 5.0 - 15.0 mmol/L    eGFR 79.7 >60.0 mL/min/1.73   BNP    Collection Time: 05/18/25 11:02 PM    Specimen: Arm, Left; Blood   Result Value Ref Range    proBNP 1,443.0 (H) 0.0 - 900.0 pg/mL   High Sensitivity Troponin T    Collection Time: 05/18/25 11:02 PM    Specimen: Arm, Left; Blood   Result Value Ref Range    HS Troponin T 27 (H) <22 ng/L   Green Top (Gel)    Collection Time: 05/18/25 11:02 PM   Result Value Ref Range    Extra Tube Hold for add-ons.    Lavender Top    Collection Time: 05/18/25 11:02 PM   Result Value Ref Range    Extra Tube hold for add-on    Gold Top - SST    Collection Time: 05/18/25 11:02 PM   Result Value Ref Range    Extra Tube Hold for add-ons.    Light Blue Top    Collection Time: 05/18/25 11:02 PM   Result Value Ref Range    Extra Tube Hold for add-ons.    CBC Auto Differential    Collection Time: 05/18/25 11:02 PM    Specimen: Arm, Left; Blood   Result Value Ref Range    WBC 10.43 3.40 - 10.80 10*3/mm3    RBC 5.73 4.14 - 5.80 10*6/mm3    Hemoglobin 15.8 13.0 - 17.7 g/dL    Hematocrit 49.7 37.5 - 51.0 %    MCV 86.7 79.0 - 97.0 fL    MCH 27.6 26.6 - 33.0 pg    MCHC 31.8 31.5 - 35.7 g/dL    RDW 13.7 12.3 - 15.4 %    RDW-SD 44.0 37.0 - 54.0 fl    MPV 9.2 6.0 - 12.0 fL    Platelets 253 140 - 450 10*3/mm3    Neutrophil % 59.3 42.7 - 76.0 %    Lymphocyte % 29.6 19.6 - 45.3 %    Monocyte % 8.1 5.0 - 12.0 %     Eosinophil % 1.9 0.3 - 6.2 %    Basophil % 0.4 0.0 - 1.5 %    Immature Grans % 0.7 (H) 0.0 - 0.5 %    Neutrophils, Absolute 6.19 1.70 - 7.00 10*3/mm3    Lymphocytes, Absolute 3.09 0.70 - 3.10 10*3/mm3    Monocytes, Absolute 0.84 0.10 - 0.90 10*3/mm3    Eosinophils, Absolute 0.20 0.00 - 0.40 10*3/mm3    Basophils, Absolute 0.04 0.00 - 0.20 10*3/mm3    Immature Grans, Absolute 0.07 (H) 0.00 - 0.05 10*3/mm3    nRBC 0.0 0.0 - 0.2 /100 WBC     Lab Comments:  My independent interpretation of the above labs: Mild elevation in troponin and BNP      Radiology:  XR Chest 1 View  Result Date: 5/18/2025  CXR ONE VIEW  HISTORY: SOA Triage Protocol  COMPARISON: 5/1/2025  TECHNIQUE: single portable AP       Allowing for submaximal inspiratory result, heart size appears within normal limits.  The lungs are normally aerated. There is no pleural effusion or pneumothorax.    This report was finalized on 5/18/2025 11:15 PM by Dr. Jose Reyes M.D on Workstation: [id number]      Radiology Comments:  I ordered the above imaging and reviewed the results.    My independent interpretation of the cxr: Cardiomegaly    EKG Interpreted by me: Atrial fibrillation, nonspecific T wave flattening, not significantly changed from previous        Rationale:  This is an overall well-appearing 54-year-old male who is presenting with complaints of worsening shortness of breath, orthopnea and dyspnea on exertion in the setting of recent atrial fibrillation diagnosis.  He presents afebrile with overall benign vital signs.  He does have an irregular rhythm on exam as well as some mild peripheral edema.    He was evaluated with an EKG, which did not demonstrate any evidence of new ischemia.  Exam and history is not consistent with ACS.  Initial troponin is mildly elevated, but this is not unsuspected given his underlying history.    Consider pulmonary embolism.  However, the patient is therapeutically anticoagulated, not tachycardic, hypoxic, so I do  not think we need to proceed with CT angiogram of the chest.    Chest x-ray did not demonstrate any other acute cardiopulmonary process as emergent cause of symptoms including pneumomediastinum, widened mediastinum, acute infiltrate or pneumothorax.     Like he is quite symptomatic from  his dysrhythmia.  He may benefit from rhythm control strategies.  Will discuss with cardiology.    Clinical Scores:                                   Progress Notes:  12:08 AM EDT: I spoke with the patient about his ED work up and diagnosis. I informed the patient that he will be admitted for further evaluation/treatment. All questions answered.      Consult:  12:05 AM EDT: Discussed case with Dr. Milian.  Reviewed history, exam, results and treatments.  Will plan on cardioversion. Plan admission to obs unit   12:07 AM EDT: I spoke with JUAN PABLO Mascorro with ed obs. Will admit.        Complexity of Care:  The patient requires admission.    Diagnosis:  Final diagnoses:   Atrial fibrillation, unspecified type   Orthopnea   Dyspnea on exertion   Other hypervolemia             Parts of this note may be an electronic transcription/translation of spoken language to printed text using the Dragon dictation system        Provider Note Signed by:     Karine Upton MD  05/19/25 0009

## 2025-05-19 NOTE — DISCHARGE SUMMARY
ED OBSERVATION PROGRESS/DISCHARGE SUMMARY    Date of Admission: 5/18/2025   LOS: 0 days   PCP: Zehra Shen APRN    Final Diagnosis: Dyspnea, symptomatic atrial fibrillation    Hospital Outcome:     54-year-old male with a medical history significant for but not limited to atrial fibrillation anticoagulated on Xarelto, chronic PVCs, hypertension, HFrEF, STEVE admitted to the ED observation unit for further management with shortness of breath.  He was recently hospitalized and had a heart cath on 5/2/2025 which showed mild coronary artery disease with 20 to 30% segment stenosis throughout the proximal to distal LAD.  Medical management was recommended.    CBC and CMP largely unremarkable.  High-sensitivity troponin was 27, 25.  BNP was 1443.  EKG shows atrial fibrillation.  Chest x-ray negative for acute findings.    Cardiology was consulted.  He will have a cardioversion this morning, remains in sinus rhythm postprocedure.  He ambulated around the unit prior to discharge without difficulty.  EP recommends discontinuing his digoxin and reducing his atenolol to 25 mg twice daily.  Patient also requested I send refills for his anticoagulant and diuretic.  He will be discharged home.  Cardiology to arrange for follow-up.  He should follow-up with his primary care doctor as well.    ROS:  General: no fevers, chills  Respiratory: no cough, dyspnea  Cardiovascular: no chest pain, palpitations  Abdomen: No abdominal pain, nausea, vomiting, or diarrhea  Neurologic: No focal weakness    Objective   Physical Exam:  I have reviewed the vital signs.  Temp:  [96.2 °F (35.7 °C)-98.2 °F (36.8 °C)] 97.5 °F (36.4 °C)  Heart Rate:  [] 52  Resp:  [18-22] 18  BP: (105-124)/(75-93) 121/79  General Appearance:    Alert, cooperative, no distress  Head:    Normocephalic, atraumatic  Eyes:    Sclerae anicteric  Neck:   Supple, no mass  Lungs: Clear to auscultation bilaterally, respirations unlabored  Heart: Regular rate and  rhythm, S1 and S2 normal, no murmur, rub or gallop  Abdomen:  Soft, nontender, bowel sounds active, nondistended  Extremities: No clubbing, cyanosis, or edema to lower extremities  Pulses:  2+ and symmetric in distal lower extremities  Skin: No rashes   Neurologic: Oriented x3, Normal strength to extremities    Results Review:    I have reviewed the labs, radiology results and diagnostic studies.    Results from last 7 days   Lab Units 05/19/25  0402   WBC 10*3/mm3 9.38   HEMOGLOBIN g/dL 16.6   HEMATOCRIT % 51.6*   PLATELETS 10*3/mm3 271     Results from last 7 days   Lab Units 05/19/25  0402 05/18/25  2302   SODIUM mmol/L 141 140   POTASSIUM mmol/L 4.0 4.1   CHLORIDE mmol/L 100 105   CO2 mmol/L 29.0 23.0   BUN mg/dL 18 15   CREATININE mg/dL 1.14 1.09   CALCIUM mg/dL 10.4 9.6   BILIRUBIN mg/dL  --  0.5   ALK PHOS U/L  --  65   ALT (SGPT) U/L  --  25   AST (SGOT) U/L  --  17   GLUCOSE mg/dL 105* 105*     Imaging Results (Last 24 Hours)       Procedure Component Value Units Date/Time    XR Chest 1 View [[id number]] Collected: 05/18/25 2315     Updated: 05/18/25 2318    Narrative:      CXR ONE VIEW      HISTORY: SOA Triage Protocol     COMPARISON: 5/1/2025     TECHNIQUE: single portable AP       Impression:         Allowing for submaximal inspiratory result, heart size appears within  normal limits.     The lungs are normally aerated. There is no pleural effusion or  pneumothorax.           This report was finalized on 5/18/2025 11:15 PM by Dr. Jose Reyes M.D on Workstation: [id number]               I have reviewed the medications.     Discharge Medications        Changes to Medications        Instructions Start Date   atenolol 25 MG tablet  Commonly known as: TENORMIN  What changed:   medication strength  how much to take   25 mg, Oral, Every 12 Hours Scheduled             Continue These Medications        Instructions Start Date   albuterol sulfate  (90 Base) MCG/ACT inhaler  Commonly known as:  PROVENTIL HFA;VENTOLIN HFA;PROAIR HFA   2 puffs, Inhalation, Every 4 Hours PRN      furosemide 20 MG tablet  Commonly known as: LASIX   20 mg, Oral, Daily      pantoprazole 40 MG EC tablet  Commonly known as: PROTONIX   40 mg, Oral, 2 Times Daily      potassium chloride 10 MEQ CR tablet   10 mEq, Oral, Daily      rivaroxaban 20 MG tablet  Commonly known as: XARELTO   20 mg, Oral, Daily With Dinner             Stop These Medications      digoxin 250 MCG tablet  Commonly known as: LANOXIN             ---------------------------------------------------------------------------------------------  Assessment & Plan   Assessment/Problem List    Shortness of breath    Plan:    Shortness of breath  Atrial fibrillation  Systolic heart failure  Hypertension  High-sensitivity troponin 27, 25  proBNP 1443  Chest x-ray shows no evidence of pleural effusion or pneumothorax  One-time dose of 80 mg IV Lasix given in ED  Strict I's and O's, daily weights  Cardiology consulted  Cardioversion x 1, successful  Reduce to 25 mg twice daily  Discontinue digoxin  Continue Xarelto  Cardiology to arrange for follow-up in the next 1 to 2 weeks     Obstructive sleep apnea  Continue home CPAP     VTE Prophylaxis:  No VTE prophylaxis order currently exists.     Disposition: Home    Follow-up after Discharge: Primary care and cardiology    This note will serve as a discharge summary    Deyanira Vides, APRN 05/19/25 12:42 EDT    I have worn appropriate PPE during this patient encounter, sanitized my hands both with entering and exiting patient's room.    32 minutes has been spent by [facility] providers in the care of this patient while under observation status on this date 05/19/25

## 2025-05-19 NOTE — CONSULTS
Electrophysiology Hospital Consult            Patient Name: Jaime Lyle Jr.  Age/Sex: 54 y.o. male  : 1970  MRN: [id number]    Date of Admission: 2025  Date of Encounter Visit: 25  Encounter Provider: RIKKI Sanderson  Referring Provider: No ref. provider found  Place of Service: [facility] CARDIOLOGY  Patient Care Team:  Zehra Shen APRN as PCP - General (Nurse Practitioner)  Chandra Nicholson MD as Consulting Physician (Cardiology)      Subjective:   EP Consultation for: Atrial fibrillation    Chief Complaint: Shortness of breath    History of Present Illness:  Jaime Lyle Jr. is a 54 y.o. male who follows with Dr. Oconnell.  He works as an EMT.  He has a history of atrial fibrillation and PVCs.    He was diagnosed with PVCs in .  He underwent stress and echo that were normal.    He was last seen in the office in 2018.    He presented to the  with complaints of chest pain was noted to be in A-fib with RVR.  He was rate controlled started on rivaroxaban.    He presented back to the ED on 2025 with complaints of chest pain.  He was still in A-fib with RVR.  We discussed HERMELINDO guided cardioversion but he preferred to try to wait until he was anticoagulated.      He presented back to the ED last night with complaints of shortness of breath.    He was in rate controlled A-fib but felt that it was contributing to his symptoms.    EP has been asked to evaluate for A-fib.      I saw the patient this morning in observation.  He says that he tried to wait until the 3 weeks but the shortness of breath got too bad.    He still has some occasional chest pressure and palpitations.    He was given IV diuretics with some improvement in symptoms.    He is currently in A-fib with heart rates in the 80s    Past Medical History:  Past Medical History:   Diagnosis Date    Acid reflux     Acute medial meniscus tear     left knee    Community acquired  pneumonia     Mild mitral regurgitation     Obesity (BMI 35.0-39.9 without comorbidity)     STEVE (obstructive sleep apnea)     Pulmonic regurgitation     trace    PVC's (premature ventricular contractions)     Tricuspid regurgitation     trace       Past Surgical History:   Procedure Laterality Date    CARDIAC CATHETERIZATION N/A 5/2/2025    Procedure: Left Heart Cath;  Surgeon: Ray Castellanos MD;  Location: [facility] CATH INVASIVE LOCATION;  Service: Cardiovascular;  Laterality: N/A;    LAPAROSCOPIC GASTRIC BANDING      ROTATOR CUFF REPAIR         Home Medications:   Medications Prior to Admission   Medication Sig Dispense Refill Last Dose/Taking    albuterol sulfate  (90 Base) MCG/ACT inhaler Inhale 2 puffs Every 4 (Four) Hours As Needed for Wheezing. 8 g 11 Taking As Needed    atenolol (TENORMIN) 50 MG tablet Take 1 tablet by mouth Every 12 (Twelve) Hours. 60 tablet 3 Taking    digoxin (LANOXIN) 250 MCG tablet Take 1 tablet by mouth Daily. 30 tablet 1 5/18/2025    furosemide (LASIX) 20 MG tablet Take 1 tablet by mouth Daily for 30 days. 30 tablet 2 5/18/2025    pantoprazole (PROTONIX) 40 MG EC tablet TAKE 1 TABLET BY MOUTH 2 TIMES A  tablet 0 5/18/2025    potassium chloride 10 MEQ CR tablet Take 1 tablet by mouth Daily. 30 tablet 2 5/18/2025    rivaroxaban (XARELTO) 20 MG tablet Take 1 tablet by mouth Daily With Dinner for 30 days. Indications: Atrial Fibrillation 30 tablet 0 5/18/2025 at  5:00 PM       Allergies:  Allergies   Allergen Reactions    Lisinopril-Hydrochlorothiazide Cough       Past Social History:  Social History     Socioeconomic History    Marital status:    Tobacco Use    Smoking status: Light Smoker     Types: Cigars     Passive exposure: Current    Smokeless tobacco: Never    Tobacco comments:     1 cigar monthly   Vaping Use    Vaping status: Never Used   Substance and Sexual Activity    Alcohol use: Yes     Comment: caffeine use    Drug use: Never    Sexual activity:  Defer       Past Family History:  Family History   Problem Relation Age of Onset    Heart disease Mother 50    Diabetes Father     Sudden death Maternal Uncle 52    Stroke Maternal Grandmother 50    Diabetes Paternal Grandmother     Diabetes Paternal Grandfather     Hypertension Other     Heart disease Other         ischemic    Diabetes Other        Review of Systems: All systems reviewed. Pertinent positives identified in HPI. All other systems are negative.     14 point ROS was performed and is negative except as outlined in HPI.     Objective:     Objective:  Vital Signs (last 24 hours)         05/18 0700 05/19 0659 05/19 0700 05/19 0833   Most Recent      Temp (°F) 96.2 -  98.2      97.5     97.5 (36.4) 05/19 0808    Heart Rate 77 -  108      74     74 05/19 0808    Resp 18 -  20      18     18 05/19 0808    /80 -  117/85      108/81     108/81 05/19 0808    SpO2 (%) 94 -  95      97     97 05/19 0808          Temp:  [96.2 °F (35.7 °C)-98.2 °F (36.8 °C)] 97.5 °F (36.4 °C)  Heart Rate:  [] 74  Resp:  [18-20] 18  BP: (108-117)/(80-85) 108/81  Body mass index is 42.67 kg/m².        Physical Exam:     General Appearance: No acute distress, well developed and well nourished.   Respiratory: No signs of respiratory distress. Respiration rhythm and depth normal.   Cardiovascular:  Heart Rate and Rhythm: irregularly irregular, Heart Sounds: Normal S1 and S2. No S3 or S4 noted  Skin: Warm and dry.   Psychiatric: Patient alert and oriented to person, place, and time. Speech and behavior appropriate. Normal mood and affect.    Labs:   Lab Review:     Results from last 7 days   Lab Units 05/19/25  0402 05/18/25  2302   SODIUM mmol/L 141 140   POTASSIUM mmol/L 4.0 4.1   CHLORIDE mmol/L 100 105   CO2 mmol/L 29.0 23.0   BUN mg/dL 18 15   CREATININE mg/dL 1.14 1.09   GLUCOSE mg/dL 105* 105*   CALCIUM mg/dL 10.4 9.6   AST (SGOT) U/L  --  17   ALT (SGPT) U/L  --  25     Results from last 7 days   Lab Units  05/19/25  0036 05/18/25  2302   HSTROP T ng/L 25* 27*     Results from last 7 days   Lab Units 05/19/25  0402   WBC 10*3/mm3 9.38   HEMOGLOBIN g/dL 16.6   HEMATOCRIT % 51.6*   PLATELETS 10*3/mm3 271                     Results from last 7 days   Lab Units 05/18/25  2302   PROBNP pg/mL 1,443.0*     Results from last 7 days   Lab Units 05/18/25  2302   DIGOXIN LVL ng/mL 1.00           EKG:         I personally viewed and interpreted the patient's EKG/Telemetry tracings.    Assessment:       Shortness of breath        Plan:   This is his third admission in the last month for symptomatic atrial fibrillation.  I discussed with him and we will plan to proceed with cardioversion this morning.    He has been anticoagulated, we discussed risk, benefits, and alternatives.    Will plan for cardioversion this morning.  Can consider discharge later today or in the morning depending on how he is feeling.          Thank you for allowing me to participate in the care of Jaime Lyle Jr.. Feel free to contact me directly with any further questions or concerns.    RIKKI Sanderson  [facility]  05/19/25  08:33 EDT

## 2025-05-19 NOTE — H&P
[facility]   HISTORY AND PHYSICAL    Patient Name: Jaime Lyle Jr.  : 1970  MRN: [id number]  Primary Care Physician:  Zehra Shen APRN  Date of admission: 2025    Patient Care Team:  Zehra Shen APRN as PCP - General (Nurse Practitioner)  Chandra Nicholson MD as Consulting Physician (Cardiology)       Subjective   Subjective     Chief Complaint:   Chief Complaint   Patient presents with    Shortness of Breath         HPI:    Jaime Lyle Jr. is a 54 y.o. male with past medical history including but not limited to atrial fibrillation on Xarelto, chronic PVCs, hypertension, systolic heart failure, STEVE who presents to [facility] ER with shortness of breath.  Patient states he was discharged from the hospital couple weeks ago after undergoing a heart cath and changes made to his home medications.  He reports things initially seemed to be okay however he was still having shortness of breath with exertion and was not able to do much around his house.  He states 3 days ago the symptoms have severely worsened.  He reports anytime he gets up he becomes extremely short of breath and lightheaded.  He reports even when sitting he will be short of breath and has to have a fan blowing on him at all times.   He reports he has been having lower extremity swelling despite taking his Lasix daily.  He denies any chest pain or chest pressure.  He reports a mildly productive cough.  Denies sore throat and fever.  Denies abdominal pain and nausea.  He reports adherence to home medications.    Review of Systems   All systems were reviewed and negative except for: What is mentioned above in the HPI.    Personal History     Past Medical History:   Diagnosis Date    Acid reflux     Acute medial meniscus tear     left knee    Community acquired pneumonia     Mild mitral regurgitation     Obesity (BMI 35.0-39.9 without comorbidity)     STEVE (obstructive sleep apnea)     Pulmonic  regurgitation     trace    PVC's (premature ventricular contractions)     Tricuspid regurgitation     trace       Past Surgical History:   Procedure Laterality Date    CARDIAC CATHETERIZATION N/A 5/2/2025    Procedure: Left Heart Cath;  Surgeon: Ray Castellanos MD;  Location: [facility] CATH INVASIVE LOCATION;  Service: Cardiovascular;  Laterality: N/A;    LAPAROSCOPIC GASTRIC BANDING      ROTATOR CUFF REPAIR         Family History: family history includes Diabetes in his father, paternal grandfather, paternal grandmother, and another family member; Heart disease in an other family member; Heart disease (age of onset: 50) in his mother; Hypertension in an other family member; Stroke (age of onset: 50) in his maternal grandmother; Sudden death (age of onset: 52) in his maternal uncle. Otherwise pertinent FHx was reviewed and not pertinent to current issue.    Social History:  reports that he has been smoking cigars. He has been exposed to tobacco smoke. He has never used smokeless tobacco. He reports current alcohol use. He reports that he does not use drugs.    Home Medications:  albuterol sulfate HFA, atenolol, digoxin, furosemide, pantoprazole, potassium chloride, and rivaroxaban    Allergies:  Allergies   Allergen Reactions    Lisinopril-Hydrochlorothiazide Cough       Objective   Objective     Vitals:   Temp:  [97.1 °F (36.2 °C)] 97.1 °F (36.2 °C)  Heart Rate:  [91] 91  Resp:  [20] 20  BP: (117)/(85) 117/85  Physical Exam    Constitutional: 54-year-old male no acute distress on room air   Eyes: PERRLA, sclerae anicteric, no conjunctival injection   HENT: NCAT, mucous membranes moist   Neck: Supple, no thyromegaly, no lymphadenopathy, trachea midline   Respiratory: Clear to auscultation bilaterally, nonlabored respirations    Cardiovascular: RRR, no murmurs, rubs, or gallops, palpable pedal pulses bilaterally   Gastrointestinal: Positive bowel sounds, soft, nontender, nondistended   Musculoskeletal: 1+ BLE edema,  no clubbing or cyanosis to extremities   Psychiatric: Appropriate affect, cooperative   Neurologic: Oriented x 3, strength symmetric in all extremities, Cranial Nerves grossly intact to confrontation, speech clear   Skin: No rashes     Result Review    Result Review:  I have personally reviewed the results from the time of this admission to 2025 00:29 EDT and agree with these findings:  [x]  Laboratory list / accordion  []  Microbiology  [x]  Radiology  [x]  EKG/Telemetry   []  Cardiology/Vascular   []  Pathology  [x]  Old records  []  Other:  Most notable findings include:     XR Chest 1 View  Result Date: 2025  CXR ONE VIEW  HISTORY: SOA Triage Protocol  COMPARISON: 2025  TECHNIQUE: single portable AP       Allowing for submaximal inspiratory result, heart size appears within normal limits.  The lungs are normally aerated. There is no pleural effusion or pneumothorax.    This report was finalized on 2025 11:15 PM by Dr. Jose Reyes M.D on Workstation: [id number]      Cardiac Catheterization/Vascular Study  Result Date: 2025  [facility] CARDIAC CATHETERIZATION PROCEDURE REPORT Patient: Jaime Lyle Jr. : 1970 MRN: [id number] Procedure Date: 25 Referring Physician: Malorie Conti MD Interventional Cardiologist: Ray Castellanos MD Indication: Cardiomyopathy, unspecified Clinical Presentation: Mr. Lyle is a 54 y.o. gentleman who presents to the hospital with new onset atrial fibrillation with rapid ventricular response he has actually been in and out of our institution over the last 5 days.  He was found to have a mildly reduced ejection fraction initial attempts were made to try and optimize his medical regimen to control his heart rate but came back with recurrent atrial fibrillation given the concerns about his cardiomyopathy initial plans were trying to do a stress test but due to his body habitus would be a 2-day stress test he had issues with  claustrophobia with a stress test and thus was ultimately referred for heart catheterization.  EP was also asked to see the patient there have been some adjustments made with his medical regimen to try and help out with his rate control Procedure performed: Coronary angiography Left heart catheterization Ultrasound guidance for vascular access Access Sites: Right radial artery Findings: 1. Coronary Artery Anatomy: Dominance: Right Left Main: Angiographically normal Left Anterior Descending: Moderate in caliber size.  Contains a scattered 20 to 30% segment stenoses throughout the proximal, mid and distal segments supplies a proximal mid and distal diagonal branches containing luminal regularities Circumflex Artery: Moderate in caliber size.  Supplies a high mid and inferior marginal branch containing luminal regularities throughout Right Coronary Artery: Moderate in caliber size contains luminal regularities throughout supplies a PDA and posterior lateral branch 2. Hemodynamics: Left Ventricle: 72/10/13 mmHg Aorta: 90/83/88 mmHg Conclusions: Mild coronary artery disease with 20 - 30% segment stenoses throughout the proximal to distal LAD LVEDP relatively normal at 13 mmHg Recommendations: Would optimize rate control according to EP's recommendations Procedure Details: Informed consent was obtained with an explanation of the risk and benefits of the procedure. The patient was brought to the Cardiac Catheterization Laboratory and was prepped and draped in a standard sterile fashion. Moderate sedation with Fentanyl and Versed was administered by the circulating nurse. Lidocaine 2% was used to anesthetize the right radial artery and a 5/6 Slender sheath was placed.  A TGR catheter was then advanced over a 0.035 guidewire into the ascending aorta.  This catheter was used to engage the right and left coronary arteries with diagnostic angiography obtained.  We then used this catheter to enter the left ventricle to measure  and LVDP and perform pullback across aortic valve.  The catheter was then removed over a guidewire. The radial artery sheath was removed without difficulty and TR Band was placed over the access site with excellent hemostasis. Patient tolerated the procedure well without any complications, and transferred to the post procedure area for recovery in a stable condition. Complications: None. Estimated Blood Loss: Minimal. Ray Castellanos MD [facility] 05/02/25 15:29 EDT               The 10-year ASCVD risk score (Ellen DAVIS, et al., 2019) is: 14.4%    Values used to calculate the score:      Age: 54 years      Sex: Male      Is Non- : No      Diabetic: No      Tobacco smoker: Yes      Systolic Blood Pressure: 117 mmHg      Is BP treated: Yes      HDL Cholesterol: 37 mg/dL      Total Cholesterol: 205 mg/dL     Assessment & Plan   Assessment / Plan     Brief Patient Summary:  Jaime Lyle Jr. is a 54 y.o. male who is being admitted to the observation unit for further evaluation of dyspnea with atrial fibrillation.  In the ER, EKG shows atrial fibrillation rate 82 bpm, no acute ischemia.  Initial high-sensitivity troponin noted at 27 and proBNP 1443.  Digoxin level is normal.  A chest x-ray shows no evidence of pleural effusion or pneumothorax.  Patient has been evaluated several times with symptomatic atrial fibrillation.  ED provider spoke with Dr. Cornelius on-call for cardiology who will plan on cardioversion in the morning.      Active Hospital Problems:  Active Hospital Problems    Diagnosis     **Shortness of breath      Plan:     Shortness of breath  Atrial fibrillation  Systolic heart failure  Hypertension  - High-sensitivity troponin 27.  Repeat  - proBNP 1443  - Chest x-ray shows no evidence of pleural effusion or pneumothorax  - One-time dose of 80 mg IV Lasix given in ED  - Strict I's and O's, daily weights  - Cardiology consulted, plan for cardioversion in the  morning  - Continue atenolol, digoxin, Xarelto  - Telemetry monitoring  - N.p.o. after midnight    Obstructive sleep apnea  - Nocturnal O2 as needed    VTE Prophylaxis:  No VTE prophylaxis order currently exists.        CODE STATUS:    Code Status (Patient has no pulse and is not breathing): CPR (Attempt to Resuscitate)  Medical Interventions (Patient has pulse or is breathing): Full Support  Level Of Support Discussed With: Patient    Admission Status:  I believe this patient meets observation status.    75 minutes have been spent by [facility] providers in the care of this patient while under observation status on 05/19/25 .      Appropriate PPE worn during patient encounter.  Hand hygeine performed before and after seeing the patient.      Electronically signed by Yumiko Lepe PA-C, 05/19/25, 12:29 AM EDT.

## 2025-05-19 NOTE — PLAN OF CARE
Goal Outcome Evaluation:              Outcome Evaluation: pt was discharged from the obs unit with all belongings and discharge paperwork, pt to follow up with pcp and cardiology as indicated, pt had no further questions at the time of discharge, wife provided transport

## 2025-05-20 ENCOUNTER — TRANSITIONAL CARE MANAGEMENT TELEPHONE ENCOUNTER (OUTPATIENT)
Dept: CALL CENTER | Facility: HOSPITAL | Age: 55
End: 2025-05-20
Payer: COMMERCIAL

## 2025-05-20 NOTE — OUTREACH NOTE
Call Center TCM Note      Flowsheet Row Responses   [facility] patient discharged from? [place]   Does the patient have one of the following disease processes/diagnoses(primary or secondary)? Other   TCM attempt successful? Yes   Call start time 0901   Call end time 0904   Discharge diagnosis Dyspnea, symptomatic atrial fibrillation   Person spoke with today (if not patient) and relationship Patient   Meds reviewed with patient/caregiver? Yes   Does the patient have all medications ordered at discharge? Yes   Is the patient taking all medications as directed (includes completed medication regime)? Yes   Comments PCP Zehra BRANDT. Hospital follow up appt scheduled for 5/29 11am with Maureen BRANDT.   Does the patient have an appointment with their PCP within 7-14 days of discharge? No   Nursing Interventions Assisted patient with making appointment per protocol, Routed TCM call to PCP office   Has home health visited the patient within 72 hours of discharge? N/A   Psychosocial issues? No   Did the patient receive a copy of their discharge instructions? Yes   Nursing interventions Reviewed instructions with patient   What is the patient's perception of their health status since discharge? Improving   Is the patient/caregiver able to teach back signs and symptoms related to disease process for when to call PCP? Yes   Is the patient/caregiver able to teach back signs and symptoms related to disease process for when to call 911? Yes   Is the patient/caregiver able to teach back the hierarchy of who to call/visit for symptoms/problems? PCP, Specialist, Home health nurse, Urgent Care, ED, 911 Yes   TCM call completed? Yes   Wrap up additional comments Cardiology appt 5/21  11am   Call end time 0904   Would this patient benefit from a Referral to Amb Social Work? No   Is the patient interested in additional calls from an ambulatory ? No            REHAN MAS - Registered Nurse    5/20/2025,  09:06 EDT

## 2025-05-21 NOTE — CASE MANAGEMENT/SOCIAL WORK
Case Management Discharge Note      Final Note: Home via family transport         Selected Continued Care - Discharged on 5/19/2025 Admission date: 5/18/2025 - Discharge disposition: Home or Self Care      Destination    No services have been selected for the patient.                Durable Medical Equipment    No services have been selected for the patient.                Dialysis/Infusion    No services have been selected for the patient.                Home Medical Care    No services have been selected for the patient.                Therapy    No services have been selected for the patient.                Community Resources    No services have been selected for the patient.                Community & DME    No services have been selected for the patient.                    Transportation Services  Private: Car    Final Discharge Disposition Code: 01 - home or self-care

## 2025-05-29 ENCOUNTER — READMISSION MANAGEMENT (OUTPATIENT)
Dept: CALL CENTER | Facility: HOSPITAL | Age: 55
End: 2025-05-29
Payer: COMMERCIAL

## 2025-05-29 ENCOUNTER — LAB (OUTPATIENT)
Dept: FAMILY MEDICINE CLINIC | Facility: CLINIC | Age: 55
End: 2025-05-29
Payer: COMMERCIAL

## 2025-05-29 ENCOUNTER — OFFICE VISIT (OUTPATIENT)
Dept: FAMILY MEDICINE CLINIC | Facility: CLINIC | Age: 55
End: 2025-05-29
Payer: COMMERCIAL

## 2025-05-29 VITALS
DIASTOLIC BLOOD PRESSURE: 88 MMHG | HEART RATE: 46 BPM | BODY MASS INDEX: 42.64 KG/M2 | OXYGEN SATURATION: 96 % | WEIGHT: 314.8 LBS | SYSTOLIC BLOOD PRESSURE: 122 MMHG | TEMPERATURE: 97.9 F | HEIGHT: 72 IN

## 2025-05-29 DIAGNOSIS — R79.89 ELEVATED BRAIN NATRIURETIC PEPTIDE (BNP) LEVEL: ICD-10-CM

## 2025-05-29 DIAGNOSIS — I50.31 ACUTE HEART FAILURE WITH PRESERVED EJECTION FRACTION: ICD-10-CM

## 2025-05-29 DIAGNOSIS — I10 ESSENTIAL HYPERTENSION: Primary | ICD-10-CM

## 2025-05-29 DIAGNOSIS — I48.91 ATRIAL FIBRILLATION, UNSPECIFIED TYPE: ICD-10-CM

## 2025-05-29 DIAGNOSIS — R22.43 LOCALIZED SWELLING OF BOTH LOWER LEGS: ICD-10-CM

## 2025-05-29 PROCEDURE — 36415 COLL VENOUS BLD VENIPUNCTURE: CPT | Performed by: NURSE PRACTITIONER

## 2025-05-29 PROCEDURE — 80053 COMPREHEN METABOLIC PANEL: CPT | Performed by: NURSE PRACTITIONER

## 2025-05-29 PROCEDURE — 83880 ASSAY OF NATRIURETIC PEPTIDE: CPT | Performed by: NURSE PRACTITIONER

## 2025-05-29 PROCEDURE — 85027 COMPLETE CBC AUTOMATED: CPT | Performed by: NURSE PRACTITIONER

## 2025-05-29 RX ORDER — POTASSIUM CHLORIDE 750 MG/1
10 TABLET, EXTENDED RELEASE ORAL 2 TIMES DAILY
Qty: 180 TABLET | Refills: 1 | Status: SHIPPED | OUTPATIENT
Start: 2025-05-29

## 2025-05-29 RX ORDER — FUROSEMIDE 40 MG/1
TABLET ORAL
Qty: 180 TABLET | Refills: 1 | Status: SHIPPED | OUTPATIENT
Start: 2025-05-29

## 2025-05-29 RX ORDER — LOSARTAN POTASSIUM 25 MG/1
25 TABLET ORAL DAILY
Qty: 90 TABLET | Refills: 1 | Status: SHIPPED | OUTPATIENT
Start: 2025-05-29

## 2025-05-29 RX ORDER — HYDROXYZINE HYDROCHLORIDE 10 MG/1
10-20 TABLET, FILM COATED ORAL EVERY 8 HOURS PRN
Qty: 30 TABLET | Refills: 0 | Status: SHIPPED | OUTPATIENT
Start: 2025-05-29

## 2025-05-29 NOTE — OUTREACH NOTE
Medical Week 2 Survey      Flowsheet Row Responses   [facility] [place] patient discharged from? [place]   Does the patient have one of the following disease processes/diagnoses(primary or secondary)? Other   Week 2 attempt successful? Yes   Call start time 1903   Discharge diagnosis Dyspnea, symptomatic atrial fibrillation   Call end time 1904   Person spoke with today (if not patient) and relationship Patient   Meds reviewed with patient/caregiver? Yes   Is the patient having any side effects they believe may be caused by any medication additions or changes? No   Does the patient have all medications ordered at discharge? Yes   Is the patient taking all medications as directed (includes completed medication regime)? Yes   Does the patient have a primary care provider?  Yes   Does the patient have an appointment with their PCP within 7 days of discharge? Yes   Has the patient kept scheduled appointments due by today? Yes   Psychosocial issues? No   Did the patient receive a copy of their discharge instructions? Yes   Nursing interventions Reviewed instructions with patient   What is the patient's perception of their health status since discharge? Improving   Is the patient/caregiver able to teach back signs and symptoms related to disease process for when to call PCP? Yes   Is the patient/caregiver able to teach back signs and symptoms related to disease process for when to call 911? Yes   Is the patient/caregiver able to teach back the hierarchy of who to call/visit for symptoms/problems? PCP, Specialist, Home health nurse, Urgent Care, ED, 911 Yes   If the patient is a current smoker, are they able to teach back resources for cessation? Not a smoker   Week 2 Call Completed? Yes   Graduated Yes   Is the patient interested in additional calls from an ambulatory ? No   Would this patient benefit from a Referral to Amb Social Work? No   Wrap up additional comments pt doing well no concerns back to work  monday   Call end time 1904            PARUL HEDRICK - Registered Nurse

## 2025-05-29 NOTE — PROGRESS NOTES
Transitional Care Follow Up Visit  Subjective     Jaime Lyle Jr. is a 54 y.o. male who presents for a transitional care management visit.    Within 48 business hours after discharge our office contacted him via telephone to coordinate his care and needs.      I reviewed and discussed the details of that call along with the discharge summary, hospital problems, inpatient lab results, inpatient diagnostic studies, and consultation reports with Jaime.     Current outpatient and discharge medications have been reconciled for the patient.  Reviewed by: RIKKI Green          5/19/2025     5:46 PM   Date of TCM Phone Call   [facility]   Date of Admission 5/18/2025   Date of Discharge 5/19/2025     Risk for Readmission (LACE) Score: 7 (5/19/2025  6:00 AM)    Chief Complaint   Patient presents with    Follow-up     ED to admit 5/18/25-5/19/25 Afib, pt states he is doing much better since having cardioversion done. Has been following with cardiology, next appt is next week. Dr Rodriguez 5/23/25 increased lasix to tid and recommended repeat labs today         History of Present Illness   Course During Hospital Stay:      Pt presented to [facility] on 5/18/25 for a-fib, cardiology consulted, underwent cardioversion on 5/19/2025, then with bradycardia, digoxin was discontinued, continued xarelto, reduced atenolol to 25mg BID. BNP 1443. Denies chest pain, palpitations, does report BLE edema, heart rate is running in the 40s, he is taking meds as recommended, along with furosemide 20mg TID which was increased last week, KCl 10meq daily, reports cramping/ache of legs. He is taking xarelto.     He was hospitalized 5/1/25-5/3/25, underwent cardiac catheterization on 5/2/2025 which showed mild coronary artery disease with 20 to 30% segment stenosis throughout the proximal to distal LAD. Medical management was recommended.  Metoprolol was changed to atenolol, started on digoxin, continue Xarelto  -BNP  -  "5/2/25 stress test:  Left ventricular ejection fraction is severely reduced (Calculated EF = 12%).  This may be inaccurate due to gating issues with atrial fibrillation.    This was stress only.  The patient became claustrophobic and had to get out of the scanner.    There were perfusion defects noted in the inferior apex and in the mid and basal lateral wall.    Results discussed with Dr. Conti.      He was also hospitalized 4/28/25-4/30/2025 for afib with RVR, started on metoprolol and Xarelto  - Echocardiogram EF 41 to 45%  - BNP 2059 on admission, down to 1768 on d/c     He wants to discuss weight loss. He reports he feels much better since cardioversion, however he is experiencing anxiety in the evenings, he worries d/t new afib and condition. He reports he has never been an anxious person but is afraid he will go to bed and not wake up. He is drinking 8-10 bottles of water per day, very minimal salt, does not wear compression stockings.  He is sleeping well once he falls asleep.       The following portions of the patient's history were reviewed and updated as appropriate: allergies, current medications, past family history, past medical history, past social history, past surgical history, and problem list.    Review of Systems    Objective   /88 (BP Location: Left arm, Patient Position: Sitting, Cuff Size: Large Adult)   Pulse (!) 46   Temp 97.9 °F (36.6 °C) (Oral)   Ht 182.9 cm (72\")   Wt (!) 143 kg (314 lb 12.8 oz)   SpO2 96%   BMI 42.69 kg/m²   Physical Exam  Constitutional:       General: He is not in acute distress.     Appearance: Normal appearance. He is well-developed. He is obese. He is not ill-appearing or diaphoretic.   HENT:      Head: Normocephalic.   Eyes:      Conjunctiva/sclera: Conjunctivae normal.      Pupils: Pupils are equal, round, and reactive to light.   Neck:      Thyroid: No thyromegaly.      Vascular: No JVD.   Cardiovascular:      Rate and Rhythm: Regular rhythm. " Bradycardia present.      Heart sounds: Normal heart sounds. No murmur heard.  Pulmonary:      Effort: Pulmonary effort is normal. No respiratory distress.      Breath sounds: Normal breath sounds. No wheezing or rhonchi.   Abdominal:      General: Bowel sounds are normal. There is no distension.      Palpations: Abdomen is soft.      Tenderness: There is no abdominal tenderness.   Musculoskeletal:         General: Swelling (trace pitting BLE) present. No tenderness. Normal range of motion.      Cervical back: Normal range of motion and neck supple. No tenderness.   Lymphadenopathy:      Cervical: No cervical adenopathy.   Skin:     General: Skin is warm and dry.      Coloration: Skin is not jaundiced.      Findings: No erythema or rash.   Neurological:      General: No focal deficit present.      Mental Status: He is alert and oriented to person, place, and time. Mental status is at baseline.      Sensory: No sensory deficit.      Motor: No weakness.      Gait: Gait normal.   Psychiatric:         Mood and Affect: Mood normal.         Behavior: Behavior normal.         Thought Content: Thought content normal.         Judgment: Judgment normal.         Assessment & Plan   Diagnoses and all orders for this visit:    1. Essential hypertension (Primary)  -     Comprehensive Metabolic Panel  -     CBC (No Diff)  -     BNP    2. Atrial fibrillation, unspecified type  -     rivaroxaban (XARELTO) 20 MG tablet; Take 1 tablet by mouth Daily With Dinner. Indications: Atrial Fibrillation  Dispense: 90 tablet; Refill: 1  -     Adult Transthoracic Echo Complete W/ Cont if Necessary Per Protocol; Future    3. Localized swelling of both lower legs  -     furosemide (LASIX) 40 MG tablet; Take 40mg po in am and 20mg (1/2 tab) at noon  Dispense: 180 tablet; Refill: 1    4. Elevated brain natriuretic peptide (BNP) level  -     Adult Transthoracic Echo Complete W/ Cont if Necessary Per Protocol; Future  -     BNP    5. Acute heart  failure with preserved ejection fraction  -     Adult Transthoracic Echo Complete W/ Cont if Necessary Per Protocol; Future  -     Comprehensive Metabolic Panel  -     CBC (No Diff)  -     BNP    Other orders  -     losartan (Cozaar) 25 MG tablet; Take 1 tablet by mouth Daily.  Dispense: 90 tablet; Refill: 1  -     potassium chloride 10 MEQ CR tablet; Take 1 tablet by mouth 2 (Two) Times a Day.  Dispense: 180 tablet; Refill: 1  -     hydrOXYzine (ATARAX) 10 MG tablet; Take 1-2 tablets by mouth Every 8 (Eight) Hours As Needed for Anxiety.  Dispense: 30 tablet; Refill: 0      Reduce atenolol to 12.5 mg twice daily for li-need to cont BB if tolerated d/t afib  BP log reviewed, 120-145 systolic, add losartan, cont to keep BP log  Cont xarelto  Labs today, will notify results.   Cont Lasix and potassium, increase potassium to 20 mill equivalents daily, adjust both as needed  Add hydroxyzine as needed for anxiety  Recheck echo, unsure the EF from stress test was accurate when previously in April 41 to 45%.  Patient to call and schedule follow-up appointment with cardiology, EKG only sched next week.   Increase water intake to get at least 2 L/day  Decrease sodium to less than 2000 mg/day  Increase protein in the diet  Recommend compression stockings  Elevate BLE when at rest    Return for Next scheduled follow up or earlier if needed .          EMR Dragon transcription disclaimer:  Part of this note may be an electronic transcription/translation of spoken language to printed text using the Dragon Dictation System.

## 2025-05-30 ENCOUNTER — RESULTS FOLLOW-UP (OUTPATIENT)
Dept: FAMILY MEDICINE CLINIC | Facility: CLINIC | Age: 55
End: 2025-05-30
Payer: COMMERCIAL

## 2025-05-30 LAB
ALBUMIN SERPL-MCNC: 4.1 G/DL (ref 3.5–5.2)
ALBUMIN/GLOB SERPL: 1.2 G/DL
ALP SERPL-CCNC: 67 U/L (ref 39–117)
ALT SERPL W P-5'-P-CCNC: 29 U/L (ref 1–41)
ANION GAP SERPL CALCULATED.3IONS-SCNC: 11.7 MMOL/L (ref 5–15)
AST SERPL-CCNC: 21 U/L (ref 1–40)
BILIRUB SERPL-MCNC: 0.7 MG/DL (ref 0–1.2)
BUN SERPL-MCNC: 5 MG/DL (ref 6–20)
BUN/CREAT SERPL: 5.2 (ref 7–25)
CALCIUM SPEC-SCNC: 9.7 MG/DL (ref 8.6–10.5)
CHLORIDE SERPL-SCNC: 99 MMOL/L (ref 98–107)
CO2 SERPL-SCNC: 26.3 MMOL/L (ref 22–29)
CREAT SERPL-MCNC: 0.97 MG/DL (ref 0.76–1.27)
DEPRECATED RDW RBC AUTO: 44.1 FL (ref 37–54)
EGFRCR SERPLBLD CKD-EPI 2021: 91.6 ML/MIN/1.73
ERYTHROCYTE [DISTWIDTH] IN BLOOD BY AUTOMATED COUNT: 14 % (ref 12.3–15.4)
GLOBULIN UR ELPH-MCNC: 3.3 GM/DL
GLUCOSE SERPL-MCNC: 90 MG/DL (ref 65–99)
HCT VFR BLD AUTO: 53.6 % (ref 37.5–51)
HGB BLD-MCNC: 16.8 G/DL (ref 13–17.7)
MCH RBC QN AUTO: 27.6 PG (ref 26.6–33)
MCHC RBC AUTO-ENTMCNC: 31.3 G/DL (ref 31.5–35.7)
MCV RBC AUTO: 88.2 FL (ref 79–97)
NT-PROBNP SERPL-MCNC: 804 PG/ML (ref 0–900)
PLATELET # BLD AUTO: 257 10*3/MM3 (ref 140–450)
PMV BLD AUTO: 10.6 FL (ref 6–12)
POTASSIUM SERPL-SCNC: 4 MMOL/L (ref 3.5–5.2)
PROT SERPL-MCNC: 7.4 G/DL (ref 6–8.5)
RBC # BLD AUTO: 6.08 10*6/MM3 (ref 4.14–5.8)
SODIUM SERPL-SCNC: 137 MMOL/L (ref 136–145)
WBC NRBC COR # BLD AUTO: 9.83 10*3/MM3 (ref 3.4–10.8)

## 2025-06-04 ENCOUNTER — CLINICAL SUPPORT (OUTPATIENT)
Dept: CARDIOLOGY | Age: 55
End: 2025-06-04
Payer: COMMERCIAL

## 2025-06-04 DIAGNOSIS — I48.91 ATRIAL FIBRILLATION WITH RVR: Primary | ICD-10-CM

## 2025-06-04 PROCEDURE — 93000 ELECTROCARDIOGRAM COMPLETE: CPT

## 2025-06-04 NOTE — PROGRESS NOTES
Procedure     ECG 12 Lead    Date/Time: 6/4/2025 1:32 PM  Performed by: Toni Nieto APRN    Authorized by: Toni Nieto APRN  Comparison: compared with previous ECG   Similar to previous ECG  Rhythm: sinus rhythm  Comments: He is maintaining sinus rhythm post cardioversion.  He came in today and we discussed proceeding with ablation to reduce future episodes of A-fib.  He prefers to monitor and consider in the next few months especially if he has recurrent A-fib.  He will follow-up in 3 months

## 2025-06-09 DIAGNOSIS — R25.2 MUSCLE CRAMPS: Primary | ICD-10-CM

## 2025-06-10 ENCOUNTER — LAB (OUTPATIENT)
Dept: FAMILY MEDICINE CLINIC | Facility: CLINIC | Age: 55
End: 2025-06-10
Payer: COMMERCIAL

## 2025-06-10 DIAGNOSIS — I48.91 ATRIAL FIBRILLATION WITH RVR: Primary | ICD-10-CM

## 2025-06-10 DIAGNOSIS — R79.89 ELEVATED BRAIN NATRIURETIC PEPTIDE (BNP) LEVEL: Primary | ICD-10-CM

## 2025-06-10 PROCEDURE — 82728 ASSAY OF FERRITIN: CPT | Performed by: NURSE PRACTITIONER

## 2025-06-10 PROCEDURE — 83880 ASSAY OF NATRIURETIC PEPTIDE: CPT | Performed by: NURSE PRACTITIONER

## 2025-06-10 PROCEDURE — 83735 ASSAY OF MAGNESIUM: CPT | Performed by: NURSE PRACTITIONER

## 2025-06-10 PROCEDURE — 80053 COMPREHEN METABOLIC PANEL: CPT | Performed by: NURSE PRACTITIONER

## 2025-06-10 PROCEDURE — 85027 COMPLETE CBC AUTOMATED: CPT | Performed by: NURSE PRACTITIONER

## 2025-06-10 PROCEDURE — 36415 COLL VENOUS BLD VENIPUNCTURE: CPT | Performed by: NURSE PRACTITIONER

## 2025-06-10 PROCEDURE — 84466 ASSAY OF TRANSFERRIN: CPT | Performed by: NURSE PRACTITIONER

## 2025-06-10 PROCEDURE — 83540 ASSAY OF IRON: CPT | Performed by: NURSE PRACTITIONER

## 2025-06-11 ENCOUNTER — HOSPITAL ENCOUNTER (OUTPATIENT)
Dept: CARDIOLOGY | Facility: HOSPITAL | Age: 55
Discharge: HOME | End: 2025-06-11
Admitting: NURSE PRACTITIONER
Payer: COMMERCIAL

## 2025-06-11 VITALS
SYSTOLIC BLOOD PRESSURE: 144 MMHG | HEIGHT: 72 IN | WEIGHT: 314 LBS | DIASTOLIC BLOOD PRESSURE: 80 MMHG | BODY MASS INDEX: 42.53 KG/M2

## 2025-06-11 DIAGNOSIS — R79.89 ELEVATED BRAIN NATRIURETIC PEPTIDE (BNP) LEVEL: ICD-10-CM

## 2025-06-11 DIAGNOSIS — I50.31 ACUTE HEART FAILURE WITH PRESERVED EJECTION FRACTION: ICD-10-CM

## 2025-06-11 DIAGNOSIS — I48.91 ATRIAL FIBRILLATION, UNSPECIFIED TYPE: ICD-10-CM

## 2025-06-11 LAB
ALBUMIN SERPL-MCNC: 4 G/DL (ref 3.5–5.2)
ALBUMIN/GLOB SERPL: 1.2 G/DL
ALP SERPL-CCNC: 68 U/L (ref 39–117)
ALT SERPL W P-5'-P-CCNC: 30 U/L (ref 1–41)
ANION GAP SERPL CALCULATED.3IONS-SCNC: 12.1 MMOL/L (ref 5–15)
AORTIC ARCH: 2.6 CM
AORTIC DIMENSIONLESS INDEX: 0.72 (DI)
ASCENDING AORTA: 3.5 CM
AST SERPL-CCNC: 21 U/L (ref 1–40)
AV MEAN PRESS GRAD SYS DOP V1V2: 3.8 MMHG
AV VMAX SYS DOP: 125.1 CM/SEC
BH CV ECHO MEAS - ACS: 2.44 CM
BH CV ECHO MEAS - AO MAX PG: 6.3 MMHG
BH CV ECHO MEAS - AO ROOT AREA (BSA CORRECTED): 1.6 CM2
BH CV ECHO MEAS - AO ROOT DIAM: 4.2 CM
BH CV ECHO MEAS - AO V2 VTI: 28.8 CM
BH CV ECHO MEAS - AVA(I,D): 3.1 CM2
BH CV ECHO MEAS - EDV(CUBED): 390.6 ML
BH CV ECHO MEAS - EDV(MOD-SP2): 232 ML
BH CV ECHO MEAS - EDV(MOD-SP4): 238 ML
BH CV ECHO MEAS - EF(MOD-SP2): 37.9 %
BH CV ECHO MEAS - EF(MOD-SP4): 35.3 %
BH CV ECHO MEAS - ESV(CUBED): 193.2 ML
BH CV ECHO MEAS - ESV(MOD-SP2): 144 ML
BH CV ECHO MEAS - ESV(MOD-SP4): 154 ML
BH CV ECHO MEAS - FS: 20.9 %
BH CV ECHO MEAS - IVS/LVPW: 1.1 CM
BH CV ECHO MEAS - IVSD: 1.23 CM
BH CV ECHO MEAS - LAT PEAK E' VEL: 8.2 CM/SEC
BH CV ECHO MEAS - LV DIASTOLIC VOL/BSA (35-75): 92.2 CM2
BH CV ECHO MEAS - LV MASS(C)D: 425.4 GRAMS
BH CV ECHO MEAS - LV MAX PG: 4.1 MMHG
BH CV ECHO MEAS - LV MEAN PG: 2.17 MMHG
BH CV ECHO MEAS - LV SYSTOLIC VOL/BSA (12-30): 59.7 CM2
BH CV ECHO MEAS - LV V1 MAX: 101.7 CM/SEC
BH CV ECHO MEAS - LV V1 VTI: 20.7 CM
BH CV ECHO MEAS - LVIDD: 7.3 CM
BH CV ECHO MEAS - LVIDS: 5.8 CM
BH CV ECHO MEAS - LVOT AREA: 4.3 CM2
BH CV ECHO MEAS - LVOT DIAM: 2.34 CM
BH CV ECHO MEAS - LVPWD: 1.12 CM
BH CV ECHO MEAS - MED PEAK E' VEL: 5.4 CM/SEC
BH CV ECHO MEAS - MV A DUR: 0.11 SEC
BH CV ECHO MEAS - MV A MAX VEL: 84.2 CM/SEC
BH CV ECHO MEAS - MV DEC SLOPE: 258.2 CM/SEC2
BH CV ECHO MEAS - MV DEC TIME: 0.2 SEC
BH CV ECHO MEAS - MV E MAX VEL: 74.9 CM/SEC
BH CV ECHO MEAS - MV E/A: 0.89
BH CV ECHO MEAS - MV MAX PG: 2.29 MMHG
BH CV ECHO MEAS - MV MEAN PG: 1.24 MMHG
BH CV ECHO MEAS - MV P1/2T: 73.7 MSEC
BH CV ECHO MEAS - MV V2 VTI: 21.5 CM
BH CV ECHO MEAS - MVA(P1/2T): 3 CM2
BH CV ECHO MEAS - MVA(VTI): 4.1 CM2
BH CV ECHO MEAS - PA ACC TIME: 0.12 SEC
BH CV ECHO MEAS - PA V2 MAX: 120.8 CM/SEC
BH CV ECHO MEAS - PULM A REVS DUR: 0.13 SEC
BH CV ECHO MEAS - PULM A REVS VEL: 23.3 CM/SEC
BH CV ECHO MEAS - PULM DIAS VEL: 51.4 CM/SEC
BH CV ECHO MEAS - PULM S/D: 1.2
BH CV ECHO MEAS - PULM SYS VEL: 61.5 CM/SEC
BH CV ECHO MEAS - RAP SYSTOLE: 3 MMHG
BH CV ECHO MEAS - RV MAX PG: 1.58 MMHG
BH CV ECHO MEAS - RV V1 MAX: 62.9 CM/SEC
BH CV ECHO MEAS - RV V1 VTI: 14.1 CM
BH CV ECHO MEAS - RVSP: 16 MMHG
BH CV ECHO MEAS - SV(LVOT): 88.8 ML
BH CV ECHO MEAS - SV(MOD-SP2): 88 ML
BH CV ECHO MEAS - SV(MOD-SP4): 84 ML
BH CV ECHO MEAS - SVI(LVOT): 34.4 ML/M2
BH CV ECHO MEAS - SVI(MOD-SP2): 34.1 ML/M2
BH CV ECHO MEAS - SVI(MOD-SP4): 32.6 ML/M2
BH CV ECHO MEAS - TAPSE (>1.6): 2.42 CM
BH CV ECHO MEAS - TR MAX PG: 13.8 MMHG
BH CV ECHO MEAS - TR MAX VEL: 186 CM/SEC
BH CV ECHO MEASUREMENTS AVERAGE E/E' RATIO: 11.01
BH CV XLRA - RV BASE: 4.1 CM
BH CV XLRA - RV LENGTH: 8.2 CM
BH CV XLRA - RV MID: 2.45 CM
BH CV XLRA - TDI S': 11.2 CM/SEC
BILIRUB SERPL-MCNC: 0.9 MG/DL (ref 0–1.2)
BUN SERPL-MCNC: 14 MG/DL (ref 6–20)
BUN/CREAT SERPL: 14.1 (ref 7–25)
CALCIUM SPEC-SCNC: 9.7 MG/DL (ref 8.6–10.5)
CHLORIDE SERPL-SCNC: 102 MMOL/L (ref 98–107)
CO2 SERPL-SCNC: 25.9 MMOL/L (ref 22–29)
CREAT SERPL-MCNC: 0.99 MG/DL (ref 0.76–1.27)
DEPRECATED RDW RBC AUTO: 43.1 FL (ref 37–54)
EGFRCR SERPLBLD CKD-EPI 2021: 89.4 ML/MIN/1.73
ERYTHROCYTE [DISTWIDTH] IN BLOOD BY AUTOMATED COUNT: 13.9 % (ref 12.3–15.4)
GLOBULIN UR ELPH-MCNC: 3.3 GM/DL
GLUCOSE SERPL-MCNC: 99 MG/DL (ref 65–99)
HCT VFR BLD AUTO: 51.8 % (ref 37.5–51)
HGB BLD-MCNC: 16.3 G/DL (ref 13–17.7)
LEFT ATRIUM VOLUME INDEX: 43 ML/M2
LV EF BIPLANE MOD: 36.5 %
MAGNESIUM SERPL-MCNC: 2.5 MG/DL (ref 1.6–2.6)
MCH RBC QN AUTO: 27.2 PG (ref 26.6–33)
MCHC RBC AUTO-ENTMCNC: 31.5 G/DL (ref 31.5–35.7)
MCV RBC AUTO: 86.5 FL (ref 79–97)
NT-PROBNP SERPL-MCNC: 357 PG/ML (ref 0–900)
PLATELET # BLD AUTO: 258 10*3/MM3 (ref 140–450)
PMV BLD AUTO: 10.1 FL (ref 6–12)
POTASSIUM SERPL-SCNC: 4 MMOL/L (ref 3.5–5.2)
PROT SERPL-MCNC: 7.3 G/DL (ref 6–8.5)
RBC # BLD AUTO: 5.99 10*6/MM3 (ref 4.14–5.8)
SINUS: 3.6 CM
SODIUM SERPL-SCNC: 140 MMOL/L (ref 136–145)
STJ: 3.4 CM
WBC NRBC COR # BLD AUTO: 8.95 10*3/MM3 (ref 3.4–10.8)

## 2025-06-11 PROCEDURE — 25510000001 PERFLUTREN 6.52 MG/ML SUSPENSION 2 ML VIAL: Performed by: NURSE PRACTITIONER

## 2025-06-11 PROCEDURE — 93306 TTE W/DOPPLER COMPLETE: CPT

## 2025-06-11 RX ADMIN — PERFLUTREN 3 ML: 6.52 INJECTION, SUSPENSION INTRAVENOUS at 08:50

## 2025-06-12 ENCOUNTER — HOSPITAL ENCOUNTER (INPATIENT)
Facility: HOSPITAL | Age: 55
LOS: 3 days | Discharge: HOME | DRG: 309 | End: 2025-06-15
Attending: EMERGENCY MEDICINE | Admitting: INTERNAL MEDICINE
Payer: COMMERCIAL

## 2025-06-12 ENCOUNTER — APPOINTMENT (OUTPATIENT)
Dept: GENERAL RADIOLOGY | Facility: HOSPITAL | Age: 55
DRG: 309 | End: 2025-06-12
Payer: COMMERCIAL

## 2025-06-12 ENCOUNTER — TELEPHONE (OUTPATIENT)
Dept: CARDIOLOGY | Age: 55
End: 2025-06-12
Payer: COMMERCIAL

## 2025-06-12 DIAGNOSIS — I47.29 NONSUSTAINED VENTRICULAR TACHYCARDIA: ICD-10-CM

## 2025-06-12 DIAGNOSIS — I49.3 FREQUENT PVCS: Primary | ICD-10-CM

## 2025-06-12 DIAGNOSIS — R42 LIGHTHEADEDNESS: ICD-10-CM

## 2025-06-12 LAB
ALBUMIN SERPL-MCNC: 4.1 G/DL (ref 3.5–5.2)
ALBUMIN/GLOB SERPL: 1.2 G/DL
ALP SERPL-CCNC: 73 U/L (ref 39–117)
ALT SERPL W P-5'-P-CCNC: 22 U/L (ref 1–41)
ANION GAP SERPL CALCULATED.3IONS-SCNC: 12 MMOL/L (ref 5–15)
AST SERPL-CCNC: 20 U/L (ref 1–40)
BASOPHILS # BLD AUTO: 0.02 10*3/MM3 (ref 0–0.2)
BASOPHILS NFR BLD AUTO: 0.2 % (ref 0–1.5)
BILIRUB SERPL-MCNC: 0.8 MG/DL (ref 0–1.2)
BUN SERPL-MCNC: 13 MG/DL (ref 6–20)
BUN/CREAT SERPL: 13.8 (ref 7–25)
CALCIUM SPEC-SCNC: 9.4 MG/DL (ref 8.6–10.5)
CHLORIDE SERPL-SCNC: 103 MMOL/L (ref 98–107)
CO2 SERPL-SCNC: 24 MMOL/L (ref 22–29)
CREAT SERPL-MCNC: 0.94 MG/DL (ref 0.76–1.27)
DEPRECATED RDW RBC AUTO: 40.6 FL (ref 37–54)
EGFRCR SERPLBLD CKD-EPI 2021: 95.1 ML/MIN/1.73
EOSINOPHIL # BLD AUTO: 0.1 10*3/MM3 (ref 0–0.4)
EOSINOPHIL NFR BLD AUTO: 1 % (ref 0.3–6.2)
ERYTHROCYTE [DISTWIDTH] IN BLOOD BY AUTOMATED COUNT: 13.4 % (ref 12.3–15.4)
GEN 5 1HR TROPONIN T REFLEX: 19 NG/L
GLOBULIN UR ELPH-MCNC: 3.4 GM/DL
GLUCOSE SERPL-MCNC: 123 MG/DL (ref 65–99)
HCT VFR BLD AUTO: 49.8 % (ref 37.5–51)
HGB BLD-MCNC: 16.5 G/DL (ref 13–17.7)
HOLD SPECIMEN: NORMAL
HOLD SPECIMEN: NORMAL
IMM GRANULOCYTES # BLD AUTO: 0.06 10*3/MM3 (ref 0–0.05)
IMM GRANULOCYTES NFR BLD AUTO: 0.6 % (ref 0–0.5)
LYMPHOCYTES # BLD AUTO: 2.5 10*3/MM3 (ref 0.7–3.1)
LYMPHOCYTES NFR BLD AUTO: 25.3 % (ref 19.6–45.3)
MAGNESIUM SERPL-MCNC: 2.3 MG/DL (ref 1.6–2.6)
MCH RBC QN AUTO: 27.5 PG (ref 26.6–33)
MCHC RBC AUTO-ENTMCNC: 33.1 G/DL (ref 31.5–35.7)
MCV RBC AUTO: 83 FL (ref 79–97)
MONOCYTES # BLD AUTO: 0.56 10*3/MM3 (ref 0.1–0.9)
MONOCYTES NFR BLD AUTO: 5.7 % (ref 5–12)
NEUTROPHILS NFR BLD AUTO: 6.64 10*3/MM3 (ref 1.7–7)
NEUTROPHILS NFR BLD AUTO: 67.2 % (ref 42.7–76)
NRBC BLD AUTO-RTO: 0 /100 WBC (ref 0–0.2)
PLATELET # BLD AUTO: 257 10*3/MM3 (ref 140–450)
PMV BLD AUTO: 9.2 FL (ref 6–12)
POTASSIUM SERPL-SCNC: 3.6 MMOL/L (ref 3.5–5.2)
PROT SERPL-MCNC: 7.5 G/DL (ref 6–8.5)
RBC # BLD AUTO: 6 10*6/MM3 (ref 4.14–5.8)
SODIUM SERPL-SCNC: 139 MMOL/L (ref 136–145)
TROPONIN T NUMERIC DELTA: -1 NG/L
TROPONIN T SERPL HS-MCNC: 20 NG/L
WBC NRBC COR # BLD AUTO: 9.88 10*3/MM3 (ref 3.4–10.8)
WHOLE BLOOD HOLD COAG: NORMAL
WHOLE BLOOD HOLD SPECIMEN: NORMAL

## 2025-06-12 PROCEDURE — 93005 ELECTROCARDIOGRAM TRACING: CPT | Performed by: EMERGENCY MEDICINE

## 2025-06-12 PROCEDURE — 36415 COLL VENOUS BLD VENIPUNCTURE: CPT

## 2025-06-12 PROCEDURE — 71045 X-RAY EXAM CHEST 1 VIEW: CPT

## 2025-06-12 PROCEDURE — 84484 ASSAY OF TROPONIN QUANT: CPT | Performed by: EMERGENCY MEDICINE

## 2025-06-12 PROCEDURE — 93010 ELECTROCARDIOGRAM REPORT: CPT | Performed by: INTERNAL MEDICINE

## 2025-06-12 PROCEDURE — 84484 ASSAY OF TROPONIN QUANT: CPT

## 2025-06-12 PROCEDURE — 85025 COMPLETE CBC W/AUTO DIFF WBC: CPT

## 2025-06-12 PROCEDURE — 99285 EMERGENCY DEPT VISIT HI MDM: CPT

## 2025-06-12 PROCEDURE — 25010000002 ONDANSETRON PER 1 MG: Performed by: NURSE PRACTITIONER

## 2025-06-12 PROCEDURE — 80053 COMPREHEN METABOLIC PANEL: CPT

## 2025-06-12 PROCEDURE — 93005 ELECTROCARDIOGRAM TRACING: CPT

## 2025-06-12 PROCEDURE — 83735 ASSAY OF MAGNESIUM: CPT | Performed by: EMERGENCY MEDICINE

## 2025-06-12 RX ORDER — ALBUTEROL SULFATE 90 UG/1
2 INHALANT RESPIRATORY (INHALATION) EVERY 4 HOURS PRN
Status: DISCONTINUED | OUTPATIENT
Start: 2025-06-12 | End: 2025-06-12 | Stop reason: CLARIF

## 2025-06-12 RX ORDER — LOSARTAN POTASSIUM 50 MG/1
50 TABLET ORAL DAILY
Status: DISCONTINUED | OUTPATIENT
Start: 2025-06-12 | End: 2025-06-13

## 2025-06-12 RX ORDER — PANTOPRAZOLE SODIUM 40 MG/1
40 TABLET, DELAYED RELEASE ORAL 2 TIMES DAILY
Status: DISCONTINUED | OUTPATIENT
Start: 2025-06-12 | End: 2025-06-15 | Stop reason: HOSPADM

## 2025-06-12 RX ORDER — FUROSEMIDE 40 MG/1
20 TABLET ORAL
Status: DISCONTINUED | OUTPATIENT
Start: 2025-06-12 | End: 2025-06-15 | Stop reason: HOSPADM

## 2025-06-12 RX ORDER — ASPIRIN 325 MG
325 TABLET ORAL ONCE
Status: DISCONTINUED | OUTPATIENT
Start: 2025-06-12 | End: 2025-06-13

## 2025-06-12 RX ORDER — ONDANSETRON 2 MG/ML
4 INJECTION INTRAMUSCULAR; INTRAVENOUS EVERY 6 HOURS PRN
Status: DISCONTINUED | OUTPATIENT
Start: 2025-06-12 | End: 2025-06-15 | Stop reason: HOSPADM

## 2025-06-12 RX ORDER — POTASSIUM CHLORIDE 750 MG/1
10 TABLET, EXTENDED RELEASE ORAL 2 TIMES DAILY
Status: DISCONTINUED | OUTPATIENT
Start: 2025-06-13 | End: 2025-06-15 | Stop reason: HOSPADM

## 2025-06-12 RX ORDER — SODIUM CHLORIDE 0.9 % (FLUSH) 0.9 %
10 SYRINGE (ML) INJECTION AS NEEDED
Status: DISCONTINUED | OUTPATIENT
Start: 2025-06-12 | End: 2025-06-15 | Stop reason: HOSPADM

## 2025-06-12 RX ORDER — NITROGLYCERIN 0.4 MG/1
0.4 TABLET SUBLINGUAL
Status: DISCONTINUED | OUTPATIENT
Start: 2025-06-12 | End: 2025-06-15 | Stop reason: HOSPADM

## 2025-06-12 RX ORDER — FUROSEMIDE 40 MG/1
40 TABLET ORAL DAILY
Status: DISCONTINUED | OUTPATIENT
Start: 2025-06-13 | End: 2025-06-15 | Stop reason: HOSPADM

## 2025-06-12 RX ORDER — POTASSIUM CHLORIDE 750 MG/1
10 TABLET, FILM COATED, EXTENDED RELEASE ORAL ONCE
Status: COMPLETED | OUTPATIENT
Start: 2025-06-12 | End: 2025-06-12

## 2025-06-12 RX ORDER — ALBUTEROL SULFATE 0.83 MG/ML
2.5 SOLUTION RESPIRATORY (INHALATION) EVERY 4 HOURS PRN
Status: DISCONTINUED | OUTPATIENT
Start: 2025-06-12 | End: 2025-06-15 | Stop reason: HOSPADM

## 2025-06-12 RX ADMIN — PANTOPRAZOLE SODIUM 40 MG: 40 TABLET, DELAYED RELEASE ORAL at 21:07

## 2025-06-12 RX ADMIN — RIVAROXABAN 20 MG: 20 TABLET, FILM COATED ORAL at 18:31

## 2025-06-12 RX ADMIN — ONDANSETRON 4 MG: 2 INJECTION, SOLUTION INTRAMUSCULAR; INTRAVENOUS at 21:03

## 2025-06-12 RX ADMIN — POTASSIUM CHLORIDE 10 MEQ: 750 TABLET, EXTENDED RELEASE ORAL at 16:33

## 2025-06-12 NOTE — ED NOTES
"Nursing report ED to floor  Jaime Lyle Jr.  54 y.o.  male    HPI :  HPI  Stated Reason for Visit: palpitations, nausea, vomiting,  History Obtained From: patient    Chief Complaint  Chief Complaint   Patient presents with    Palpitations       Admitting doctor:   Malorie Conti MD    Admitting diagnosis:   The primary encounter diagnosis was Frequent PVCs. Diagnoses of Lightheadedness and Nonsustained ventricular tachycardia were also pertinent to this visit.    Code status:   Current Code Status       Date Active Code Status Order ID Comments User Context       Prior            Allergies:   Lisinopril-hydrochlorothiazide    Isolation:   No active isolations    Intake and Output  No intake or output data in the 24 hours ending 06/12/25 1720    Weight:       06/12/25  1708   Weight: (!) 142 kg (314 lb)       Most recent vitals:   Vitals:    06/12/25 1600 06/12/25 1704 06/12/25 1707 06/12/25 1708   BP: 109/63 117/83 (!) 125/101 (!) 125/101   BP Location:    Right arm   Patient Position:    Lying   Pulse: 89 90 95 96   Resp:    18   Temp:       SpO2: 95% 97% 96% 97%   Weight:    (!) 142 kg (314 lb)   Height:    182.9 cm (72\")       Active LDAs/IV Access:   Lines, Drains & Airways       Active LDAs       Name Placement date Placement time Site Days    Peripheral IV 05/18/25 2250 20 G Left Antecubital 05/18/25  2250  Antecubital  24    Peripheral IV 06/12/25 1638 20 G Anterior;Left;Proximal Forearm 06/12/25  1638  Forearm  less than 1                    Labs (abnormal labs have a star):   Labs Reviewed   COMPREHENSIVE METABOLIC PANEL - Abnormal; Notable for the following components:       Result Value    Glucose 123 (*)     All other components within normal limits    Narrative:     GFR Categories in Chronic Kidney Disease (CKD)              GFR Category          GFR (mL/min/1.73)    Interpretation  G1                    90 or greater        Normal or high (1)  G2                    60-89                Mild decrease " (1)  G3a                   45-59                Mild to moderate decrease  G3b                   30-44                Moderate to severe decrease  G4                    15-29                Severe decrease  G5                    14 or less           Kidney failure    (1)In the absence of evidence of kidney disease, neither GFR category G1 or G2 fulfill the criteria for CKD.    eGFR calculation 2021 CKD-EPI creatinine equation, which does not include race as a factor   CBC WITH AUTO DIFFERENTIAL - Abnormal; Notable for the following components:    RBC 6.00 (*)     Immature Grans % 0.6 (*)     Immature Grans, Absolute 0.06 (*)     All other components within normal limits   TROPONIN - Normal    Narrative:     High Sensitive Troponin T Reference Range:  <14.0 ng/L- Negative Female for AMI  <22.0 ng/L- Negative Male for AMI  >=14 - Abnormal Female indicating possible myocardial injury.  >=22 - Abnormal Male indicating possible myocardial injury.   Clinicians would have to utilize clinical acumen, EKG, Troponin, and serial changes to determine if it is an Acute Myocardial Infarction or myocardial injury due to an underlying chronic condition.        HIGH SENSITIVITIY TROPONIN T 1HR - Normal    Narrative:     High Sensitive Troponin T Reference Range:  <14.0 ng/L- Negative Female for AMI  <22.0 ng/L- Negative Male for AMI  >=14 - Abnormal Female indicating possible myocardial injury.  >=22 - Abnormal Male indicating possible myocardial injury.   Clinicians would have to utilize clinical acumen, EKG, Troponin, and serial changes to determine if it is an Acute Myocardial Infarction or myocardial injury due to an underlying chronic condition.        MAGNESIUM - Normal   RAINBOW DRAW    Narrative:     The following orders were created for panel order Stout Draw.  Procedure                               Abnormality         Status                     ---------                               -----------         ------                      Green Top (Gel)[[id number]]                                  Final result               Lavender Top[[id number]]                                     Final result               Gold Top - SST[[id number]]                                   Final result               Light Blue Top[[id number]]                                   Final result                 Please view results for these tests on the individual orders.   CBC AND DIFFERENTIAL    Narrative:     The following orders were created for panel order CBC & Differential.  Procedure                               Abnormality         Status                     ---------                               -----------         ------                     CBC Auto Differential[[id number]]        Abnormal            Final result                 Please view results for these tests on the individual orders.   GREEN TOP   LAVENDER TOP   GOLD TOP - SST   LIGHT BLUE TOP       EKG:   ECG 12 Lead ED Triage Standing Order; Chest Pain   Preliminary Result   HEART RATE=96  bpm   RR Rqdsmhyf=919  ms   MT Exyqdglu=595  ms   P Horizontal Axis=5  deg   P Front Axis=55  deg   QRSD Xsdkiojc=568  ms   QT Lrhosaju=348  ms   LYlY=668  ms   QRS Axis=-30  deg   T Wave Axis=127  deg   - ABNORMAL ECG -   Sinus rhythm   Paired ventricular premature complexes   Borderline IVCD with LAD   Anterior infarct, old   Nonspecific T abnormalities, lateral leads   When compared with ECG of 19-May-2025 05:21:48,   Nonspecific significant change   Date and Time of Study:2025-06-12 14:29:44          Meds given in ED:   Medications   sodium chloride 0.9 % flush 10 mL (has no administration in time range)   aspirin tablet 325 mg (325 mg Oral Not Given 6/12/25 1630)   nitroglycerin (NITROSTAT) SL tablet 0.4 mg (has no administration in time range)   furosemide (LASIX) tablet 40 mg (has no administration in time range)   furosemide (LASIX) tablet 20 mg (has no administration in time range)   losartan (COZAAR)  tablet 50 mg (has no administration in time range)   pantoprazole (PROTONIX) EC tablet 40 mg (has no administration in time range)   potassium chloride (K-DUR,KLOR-CON) ER tablet 10 mEq (has no administration in time range)   rivaroxaban (XARELTO) tablet 20 mg (has no administration in time range)   albuterol (PROVENTIL) nebulizer solution 0.083% 2.5 mg/3mL (has no administration in time range)   potassium chloride (K-DUR,KLOR-CON) ER tablet 10 mEq (10 mEq Oral Given 6/12/25 1633)       Imaging results:  XR Chest 1 View  Result Date: 6/12/2025  No focal pulmonary consolidation. Follow-up as clinical indications persist.  This report was finalized on 6/12/2025 3:09 PM by Dr. Valdo Wells M.D on Workstation: [id number]        Ambulatory status:   - Ad Makenzie    Social issues:   Social History     Socioeconomic History    Marital status:    Tobacco Use    Smoking status: Light Smoker     Types: Cigars     Passive exposure: Current    Smokeless tobacco: Never    Tobacco comments:     1 cigar monthly   Vaping Use    Vaping status: Never Used   Substance and Sexual Activity    Alcohol use: Yes     Comment: caffeine use    Drug use: Never    Sexual activity: Defer       Peripheral Neurovascular  Peripheral Neurovascular (Adult)  Peripheral Neurovascular WDL: WDL    Neuro Cognitive  Neuro Cognitive (Adult)  Cognitive/Neuro/Behavioral WDL: WDL    Learning  Learning Assessment  Learning Readiness and Ability: no barriers identified    Respiratory  Respiratory WDL  Respiratory WDL: WDL    Abdominal Pain       Pain Assessments  Pain (Adult)  (0-10) Pain Rating: Rest: 0  (0-10) Pain Rating: Activity: 0    NIH Stroke Scale       Caty Elder RN  06/12/25 17:20 EDT

## 2025-06-12 NOTE — ED PROVIDER NOTES
MD ATTESTATION NOTE    SHARED VISIT: This visit was performed by BOTH a physician and an APC. The substantive portion of the medical decision making was performed by this attesting physician who made or approved the management plan and takes responsibility for patient management. All studies in the APC note (if performed) were independently interpreted by me.     The TONIA and I have discussed this patient's history, physical exam, and treatment plan.  I have reviewed the documentation and affirm the documentation and agree with the treatment and plan.  The attached note describes my personal findings.      Independent Historians: Patient    A complete HPI/ROS/PMH/PSH/SH/FH are unobtainable due to: None    Chronic or social conditions impacting patient care (social determinants of health): None    Jaime Lyle Jr. is a 54 y.o. male who presents to the ED c/o acute palpitations over last couple of weeks with long episode earlier today with sob, nausea, and near syncope. Pt has iphone 3 lead tracing of what appears to be vtach. Pt with long h/o pvcs and followed by EP cards as well as Dr. Conti.  Pt with elevated BP with medication changes recently including discontinuation of his atenolol and addition of losartan.  Pt had echo done yesterday with reduced EF from previous and cards appt with Dr. Conti moved up from Nov to next week. However, with patient's episodes of palpitations becoming longer he decided to come in for evaluation sooner.          On exam:  GENERAL: pleasant cooperative obese M, conversant, alert, no acute distress  SKIN: Warm, dry  HENT: Normocephalic, atraumatic  EYES: no scleral icterus, eomi  CV: irregular rhythm, regular rate  RESPIRATORY: normal effort, diminished at the bases with no wheezing  ABDOMEN: soft, nontender, nondistended  MUSCULOSKELETAL: no deformity, + le edema bilaterally  NEURO: alert, moves all extremities, follows commands                                                              Labs  Recent Results (from the past 24 hours)   ECG 12 Lead ED Triage Standing Order; Chest Pain    Collection Time: 06/12/25  2:29 PM   Result Value Ref Range    QT Interval 379 ms    QTC Interval 475 ms   Comprehensive Metabolic Panel    Collection Time: 06/12/25  2:32 PM    Specimen: Arm, Right; Blood   Result Value Ref Range    Glucose 123 (H) 65 - 99 mg/dL    BUN 13.0 6.0 - 20.0 mg/dL    Creatinine 0.94 0.76 - 1.27 mg/dL    Sodium 139 136 - 145 mmol/L    Potassium 3.6 3.5 - 5.2 mmol/L    Chloride 103 98 - 107 mmol/L    CO2 24.0 22.0 - 29.0 mmol/L    Calcium 9.4 8.6 - 10.5 mg/dL    Total Protein 7.5 6.0 - 8.5 g/dL    Albumin 4.1 3.5 - 5.2 g/dL    ALT (SGPT) 22 1 - 41 U/L    AST (SGOT) 20 1 - 40 U/L    Alkaline Phosphatase 73 39 - 117 U/L    Total Bilirubin 0.8 0.0 - 1.2 mg/dL    Globulin 3.4 gm/dL    A/G Ratio 1.2 g/dL    BUN/Creatinine Ratio 13.8 7.0 - 25.0    Anion Gap 12.0 5.0 - 15.0 mmol/L    eGFR 95.1 >60.0 mL/min/1.73   High Sensitivity Troponin T    Collection Time: 06/12/25  2:32 PM    Specimen: Arm, Right; Blood   Result Value Ref Range    HS Troponin T 20 <22 ng/L   Green Top (Gel)    Collection Time: 06/12/25  2:32 PM   Result Value Ref Range    Extra Tube Hold for add-ons.    Lavender Top    Collection Time: 06/12/25  2:32 PM   Result Value Ref Range    Extra Tube hold for add-on    Gold Top - SST    Collection Time: 06/12/25  2:32 PM   Result Value Ref Range    Extra Tube Hold for add-ons.    Light Blue Top    Collection Time: 06/12/25  2:32 PM   Result Value Ref Range    Extra Tube Hold for add-ons.    CBC Auto Differential    Collection Time: 06/12/25  2:32 PM    Specimen: Arm, Right; Blood   Result Value Ref Range    WBC 9.88 3.40 - 10.80 10*3/mm3    RBC 6.00 (H) 4.14 - 5.80 10*6/mm3    Hemoglobin 16.5 13.0 - 17.7 g/dL    Hematocrit 49.8 37.5 - 51.0 %    MCV 83.0 79.0 - 97.0 fL    MCH 27.5 26.6 - 33.0 pg    MCHC 33.1 31.5 - 35.7 g/dL    RDW 13.4 12.3 - 15.4 %    RDW-SD 40.6 37.0 - 54.0 fl     MPV 9.2 6.0 - 12.0 fL    Platelets 257 140 - 450 10*3/mm3    Neutrophil % 67.2 42.7 - 76.0 %    Lymphocyte % 25.3 19.6 - 45.3 %    Monocyte % 5.7 5.0 - 12.0 %    Eosinophil % 1.0 0.3 - 6.2 %    Basophil % 0.2 0.0 - 1.5 %    Immature Grans % 0.6 (H) 0.0 - 0.5 %    Neutrophils, Absolute 6.64 1.70 - 7.00 10*3/mm3    Lymphocytes, Absolute 2.50 0.70 - 3.10 10*3/mm3    Monocytes, Absolute 0.56 0.10 - 0.90 10*3/mm3    Eosinophils, Absolute 0.10 0.00 - 0.40 10*3/mm3    Basophils, Absolute 0.02 0.00 - 0.20 10*3/mm3    Immature Grans, Absolute 0.06 (H) 0.00 - 0.05 10*3/mm3    nRBC 0.0 0.0 - 0.2 /100 WBC       Radiology  XR Chest 1 View  Result Date: 6/12/2025  XR CHEST 1 VW-  HISTORY: Male who is 54 years-old, chest pain  TECHNIQUE: Frontal views of the chest  COMPARISON: 5/18/2025  FINDINGS: Heart size is normal. Generous cardiac fat pad is apparent. Pulmonary vasculature is unremarkable. No focal pulmonary consolidation, pleural effusion, or pneumothorax. No acute osseous process.      No focal pulmonary consolidation. Follow-up as clinical indications persist.  This report was finalized on 6/12/2025 3:09 PM by Dr. Valdo Wells M.D on Workstation: [id number]        Medical Decision Making:  ED Course as of 06/12/25 2323   Thu Jun 12, 2025   1610 Consulted with Dr. Conti,  with Cardiology    We discussed the patient's history and physical exam, along with pertinent lab and imaging findings.  Patient be admitted for further evaluation and treatment.  Dr Gill call RIKKI Muñoz to see if patient has been taken off atenolol, may give 20 of potassium depending on conversation.  Patient will be admitted to Dr. Conti   [CV]   1611 HS Troponin T: 20 [CV]   1611 Glucose(!): 123 [CV]   1611 BUN: 13.0 [CV]   1611 Creatinine: 0.94 [CV]   1611 Sodium: 139 [CV]   1611 Potassium: 3.6 [CV]   1612 XR Chest 1 View  I reviewed the patient's chest x-ray in PACS  No evidence of a focal infiltrate [CV]      ED Course User Index  [CV] jose enrique Fan  Jason YOUSIF PA-C       MDM: The differential diagnosis for generalized weakness or even near syncope/lightheadedness is quite broad and includes but is not limited to: hypoglycemia, orthostasis, arrhythmia, ACS, PE, electrolyte disturbances, dka, renal failure, profound anemia, aortic dissection, severe aortic stenosis, gi bleeding, intoxication, myasthenia gravis, sepsis, and medication effects--among other possibilities.    Procedures:  Procedures        PPE: I followed hospital protocols for proper PPE based on patient presentation including use of N95 mask for suspected infectious respiratory conditions.  Proper hand hygiene was performed both before and after the patient encounter.          Diagnosis  Final diagnoses:   None       Note Disclaimer: At [facility], we believe that sharing information builds trust and better relationships. You are receiving this note because you recently visited [facility]. It is possible you will see health information before a provider has talked with you about it. This kind of information can be easy to misunderstand. To help you fully understand what it means for your health, we urge you to discuss this note with your provider.       Yarelis Sanabria MD  06/12/25 7157

## 2025-06-12 NOTE — TELEPHONE ENCOUNTER
Pt's wife, Mady, called the office this afternoon. She said pt is having bigeminy, trigeminy and runs of V-tach. He has nausea, vomiting and is diaphoretic. I have asked that she bring him to ER. She verbalized understanding.    Thank you,    Alice Toscano, RN  Triage [facility]  06/12/25 13:59 EDT

## 2025-06-12 NOTE — ED PROVIDER NOTES
EMERGENCY DEPARTMENT ENCOUNTER    Room Number:  2216/1  Date of encounter:  6/12/2025  PCP: Zehra Shen APRN  Historian: Patient  Full history not obtainable due to: None    HPI:  Chief Complaint: Palpitations    Context: Jaime Lyle Jr. is a 54 y.o. male with a PMH significant for PVCs, obesity, palpitations, hypertension, edema, bradycardia, GERD, bronchitis A-fib RVR, acute systolic heart failure, shortness of breath, chronic fatigue who presents to the ED c/o palpitations.      MEDICAL RECORD REVIEW:    Upon review of the medical record it appears the patient was evaluated 6/12/2025.  The patient had a transthoracic echocardiogram, showed calculated digit left ejection fraction was 36.5%.  Prior echo performed 4/29/25 showed left ventricular ejection fraction ranging from 41 to 45%.    PAST MEDICAL HISTORY    Active Ambulatory Problems     Diagnosis Date Noted    PVC's (premature ventricular contractions) 01/09/2018    Obesity (BMI 35.0-39.9 without comorbidity)     Cigar smoker 08/20/2018    Palpitations 08/20/2018    Tobacco use 06/08/2021    Essential hypertension 06/08/2021    Localized edema 06/08/2021    Preventative health care 06/08/2021    Bradycardia 06/08/2021    Screening for colon cancer 06/08/2021    Screening for malignant neoplasm of prostate 06/08/2021    Hyperglycemia 06/08/2021    Frequent PVCs 06/08/2021    Gastroesophageal reflux disease 09/06/2022    Bronchitis 12/29/2022    Need for vaccination against Streptococcus pneumoniae 01/10/2024    Personal history of gastric banding 01/10/2024    Class 3 severe obesity with serious comorbidity and body mass index (BMI) of 40.0 to 44.9 in adult 01/10/2024    Screening for malignant neoplasm of colon 01/10/2024    Annual physical exam 01/11/2024    Chronic pain of left knee 01/11/2024    Vitamin D deficiency 09/11/2024    Chronic fatigue 09/11/2024    Snoring 09/11/2024    STEVE (obstructive sleep apnea) 01/24/2025    Atrial  fibrillation with RVR 04/28/2025    Acute systolic heart failure 04/30/2025    Localized swelling of both lower legs 05/07/2025    Shortness of breath 05/19/2025     Resolved Ambulatory Problems     Diagnosis Date Noted    No Resolved Ambulatory Problems     Past Medical History:   Diagnosis Date    Abnormal ECG 2010    Acid reflux     Acute medial meniscus tear     Arrhythmia 2010    Atrial fibrillation 2025    Community acquired pneumonia     Hyperlipidemia 2021    Hypertension 2025    Mild mitral regurgitation     Pulmonic regurgitation     Tricuspid regurgitation          PAST SURGICAL HISTORY  Past Surgical History:   Procedure Laterality Date    CARDIAC CATHETERIZATION N/A 5/2/2025    Procedure: Left Heart Cath;  Surgeon: Ray Castellanos MD;  Location: [facility] CATH INVASIVE LOCATION;  Service: Cardiovascular;  Laterality: N/A;    LAPAROSCOPIC GASTRIC BANDING      ROTATOR CUFF REPAIR           FAMILY HISTORY  Family History   Problem Relation Age of Onset    Heart disease Mother 50    Arrhythmia Mother     Diabetes Father     Sudden death Maternal Uncle 52    Stroke Maternal Grandmother 50    Diabetes Paternal Grandmother     Diabetes Paternal Grandfather     Hypertension Other     Heart disease Other         ischemic    Diabetes Other          SOCIAL HISTORY  Social History     Socioeconomic History    Marital status:    Tobacco Use    Smoking status: Light Smoker     Types: Cigars     Passive exposure: Current    Smokeless tobacco: Never    Tobacco comments:     1 cigar monthly   Vaping Use    Vaping status: Never Used   Substance and Sexual Activity    Alcohol use: Yes     Comment: caffeine use    Drug use: Never    Sexual activity: Defer         ALLERGIES  Lisinopril-hydrochlorothiazide        REVIEW OF SYSTEMS    All systems reviewed and marked as negative except as listed in HPI     PHYSICAL EXAM    I have reviewed the triage vital signs and nursing notes.    ED Triage Vitals   Temp Heart Rate  Resp BP SpO2   06/12/25 1433 06/12/25 1425 06/12/25 1430 06/12/25 1433 06/12/25 1425   98.2 °F (36.8 °C) (!) 49 18 122/58 96 %      Temp src Heart Rate Source Patient Position BP Location FiO2 (%)   -- -- -- -- --              Physical Exam  Constitutional:       General: He is not in acute distress.     Appearance: Normal appearance. He is not ill-appearing.   HENT:      Head: Normocephalic and atraumatic.      Nose: Nose normal.   Eyes:      Extraocular Movements: Extraocular movements intact.      Pupils: Pupils are equal, round, and reactive to light.   Cardiovascular:      Rate and Rhythm: Tachycardia present. Rhythm irregular.      Pulses: Normal pulses.      Heart sounds: Normal heart sounds.   Pulmonary:      Effort: Pulmonary effort is normal. No respiratory distress.      Breath sounds: Normal breath sounds.   Abdominal:      General: Abdomen is flat.      Palpations: Abdomen is soft.   Skin:     General: Skin is warm and dry.      Coloration: Skin is not jaundiced.   Neurological:      Mental Status: He is alert and oriented to person, place, and time.   Psychiatric:         Mood and Affect: Mood normal.         Behavior: Behavior normal.         Thought Content: Thought content normal.         Vital signs and nursing notes reviewed.      LAB RESULTS  Recent Results (from the past 24 hours)   ECG 12 Lead ED Triage Standing Order; Chest Pain    Collection Time: 06/12/25  2:29 PM   Result Value Ref Range    QT Interval 379 ms    QTC Interval 475 ms   Comprehensive Metabolic Panel    Collection Time: 06/12/25  2:32 PM    Specimen: Arm, Right; Blood   Result Value Ref Range    Glucose 123 (H) 65 - 99 mg/dL    BUN 13.0 6.0 - 20.0 mg/dL    Creatinine 0.94 0.76 - 1.27 mg/dL    Sodium 139 136 - 145 mmol/L    Potassium 3.6 3.5 - 5.2 mmol/L    Chloride 103 98 - 107 mmol/L    CO2 24.0 22.0 - 29.0 mmol/L    Calcium 9.4 8.6 - 10.5 mg/dL    Total Protein 7.5 6.0 - 8.5 g/dL    Albumin 4.1 3.5 - 5.2 g/dL    ALT (SGPT) 22  1 - 41 U/L    AST (SGOT) 20 1 - 40 U/L    Alkaline Phosphatase 73 39 - 117 U/L    Total Bilirubin 0.8 0.0 - 1.2 mg/dL    Globulin 3.4 gm/dL    A/G Ratio 1.2 g/dL    BUN/Creatinine Ratio 13.8 7.0 - 25.0    Anion Gap 12.0 5.0 - 15.0 mmol/L    eGFR 95.1 >60.0 mL/min/1.73   High Sensitivity Troponin T    Collection Time: 06/12/25  2:32 PM    Specimen: Arm, Right; Blood   Result Value Ref Range    HS Troponin T 20 <22 ng/L   Green Top (Gel)    Collection Time: 06/12/25  2:32 PM   Result Value Ref Range    Extra Tube Hold for add-ons.    Lavender Top    Collection Time: 06/12/25  2:32 PM   Result Value Ref Range    Extra Tube hold for add-on    Gold Top - SST    Collection Time: 06/12/25  2:32 PM   Result Value Ref Range    Extra Tube Hold for add-ons.    Light Blue Top    Collection Time: 06/12/25  2:32 PM   Result Value Ref Range    Extra Tube Hold for add-ons.    CBC Auto Differential    Collection Time: 06/12/25  2:32 PM    Specimen: Arm, Right; Blood   Result Value Ref Range    WBC 9.88 3.40 - 10.80 10*3/mm3    RBC 6.00 (H) 4.14 - 5.80 10*6/mm3    Hemoglobin 16.5 13.0 - 17.7 g/dL    Hematocrit 49.8 37.5 - 51.0 %    MCV 83.0 79.0 - 97.0 fL    MCH 27.5 26.6 - 33.0 pg    MCHC 33.1 31.5 - 35.7 g/dL    RDW 13.4 12.3 - 15.4 %    RDW-SD 40.6 37.0 - 54.0 fl    MPV 9.2 6.0 - 12.0 fL    Platelets 257 140 - 450 10*3/mm3    Neutrophil % 67.2 42.7 - 76.0 %    Lymphocyte % 25.3 19.6 - 45.3 %    Monocyte % 5.7 5.0 - 12.0 %    Eosinophil % 1.0 0.3 - 6.2 %    Basophil % 0.2 0.0 - 1.5 %    Immature Grans % 0.6 (H) 0.0 - 0.5 %    Neutrophils, Absolute 6.64 1.70 - 7.00 10*3/mm3    Lymphocytes, Absolute 2.50 0.70 - 3.10 10*3/mm3    Monocytes, Absolute 0.56 0.10 - 0.90 10*3/mm3    Eosinophils, Absolute 0.10 0.00 - 0.40 10*3/mm3    Basophils, Absolute 0.02 0.00 - 0.20 10*3/mm3    Immature Grans, Absolute 0.06 (H) 0.00 - 0.05 10*3/mm3    nRBC 0.0 0.0 - 0.2 /100 WBC   Magnesium    Collection Time: 06/12/25  2:32 PM    Specimen: Arm, Right;  Blood   Result Value Ref Range    Magnesium 2.3 1.6 - 2.6 mg/dL   High Sensitivity Troponin T 1Hr    Collection Time: 06/12/25  4:18 PM    Specimen: Arm, Left; Blood   Result Value Ref Range    HS Troponin T 19 <22 ng/L    Troponin T Numeric Delta -1 Abnormal if >/=3 ng/L       Ordered the above labs and independently reviewed the results.        RADIOLOGY  XR Chest 1 View  Result Date: 6/12/2025  XR CHEST 1 VW-  HISTORY: Male who is 54 years-old, chest pain  TECHNIQUE: Frontal views of the chest  COMPARISON: 5/18/2025  FINDINGS: Heart size is normal. Generous cardiac fat pad is apparent. Pulmonary vasculature is unremarkable. No focal pulmonary consolidation, pleural effusion, or pneumothorax. No acute osseous process.      No focal pulmonary consolidation. Follow-up as clinical indications persist.  This report was finalized on 6/12/2025 3:09 PM by Dr. Valdo Wells M.D on Workstation: [id number]        I ordered the above noted radiological studies. Independently reviewed by me and discussed with radiologist.  See dictation above for official radiology interpretation.        MEDICATIONS GIVEN IN ER    Medications   sodium chloride 0.9 % flush 10 mL (has no administration in time range)   aspirin tablet 325 mg (325 mg Oral Not Given 6/12/25 1630)   nitroglycerin (NITROSTAT) SL tablet 0.4 mg (has no administration in time range)   furosemide (LASIX) tablet 40 mg (has no administration in time range)   furosemide (LASIX) tablet 20 mg (20 mg Oral Not Given 6/12/25 1809)   losartan (COZAAR) tablet 50 mg (50 mg Oral Not Given 6/12/25 1809)   pantoprazole (PROTONIX) EC tablet 40 mg (has no administration in time range)   potassium chloride (KLOR-CON M10) CR tablet 10 mEq (has no administration in time range)   rivaroxaban (XARELTO) tablet 20 mg (20 mg Oral Given 6/12/25 1831)   albuterol (PROVENTIL) nebulizer solution 0.083% 2.5 mg/3mL (has no administration in time range)   potassium chloride (K-DUR,KLOR-CON) ER  tablet 10 mEq (10 mEq Oral Given 6/12/25 0999)         PROGRESS, DATA ANALYSIS, CONSULTS, AND MEDICAL DECISION MAKING    All labs have been independently interpreted by me.  All radiology studies have been interpreted by me.  Discussion below represents my analysis of pertinent findings related to patient's condition, differential diagnosis, treatment plan and final disposition.    Stay throughout the emergency department today patient found to have multiple runs of frequent PVCs that could be nonsustained ventricular tachycardia.  No significant electrolyte derangement although potassium is borderline low at 3.6.  No anemia or leukocytosis.  Patient is overall feeling okay, denies any presyncopal symptoms at this time outside of feeling warm but denies nausea or vomiting or lightheadedness.  Dr. Conti consulted, patient is to be admitted for continuous cardiac monitoring and possible ablation versus other therapeutic treatment of arrhythmias    - Chronic or social conditions impacting care: Atrial fibrillation with RVR      DIFFERENTIAL DIAGNOSIS INCLUDE BUT NOT LIMITED TO: Differential diagnosis includes but is not limited to:  -acute coronary syndrome  -pulmonary embolism  -thoracic aortic dissection  -pneumonia  -pneumothorax  -musculoskeletal pain  -GERD  -esophageal spasm  -anxiety  -myocarditis/pericarditis  -esophageal rupture  -pancreatitis.           Orders placed during this visit:  Orders Placed This Encounter   Procedures    XR Chest 1 View    [place] Draw    Comprehensive Metabolic Panel    High Sensitivity Troponin T    CBC Auto Differential    High Sensitivity Troponin T 1Hr    Magnesium    Basic Metabolic Panel    Magnesium    Diet: Cardiac; Healthy Heart (2-3 Na+); Fluid Consistency: Thin (IDDSI 0)    Undress & Gown    Continuous Pulse Oximetry    Maintain IV Access    Telemetry - Place Orders & Notify Provider of Results When Patient Experiences Acute Chest Pain, Dysrhythmia or Respiratory Distress     May Be Off Telemetry for Tests    Cardiology (on-call MD unless specified)    Cardiac Electrophysiologist Inpatient Consult    Oxygen Therapy- Nasal Cannula; Titrate 1-6 LPM Per SpO2; 90 - 95%    ECG 12 Lead ED Triage Standing Order; Chest Pain    ECG 12 Lead ED Triage Standing Order; Chest Pain    Telemetry Scan    Telemetry Scan    Insert Peripheral IV    Inpatient Admission    CBC & Differential    Green Top (Gel)    Lavender Top    Gold Top - SST    Light Blue Top         ED Course as of 06/12/25 1842   Thu Jun 12, 2025   1610 Consulted with Dr. Conti,  with Cardiology    We discussed the patient's history and physical exam, along with pertinent lab and imaging findings.  Patient be admitted for further evaluation and treatment.  Dr Conti to call RIKKI Muñoz to see if patient has been taken off atenolol, may give 20 of potassium depending on conversation.  Patient will be admitted to Dr. Conti    [CV]   1611 HS Troponin T: 20 [CV]   1611 Glucose(!): 123 [CV]   1611 BUN: 13.0 [CV]   1611 Creatinine: 0.94 [CV]   1611 Sodium: 139 [CV]   1611 Potassium: 3.6 [CV]   1612 XR Chest 1 View  I reviewed the patient's chest x-ray in PACS  No evidence of a focal infiltrate [CV]      ED Course User Index  [CV] Jason Manrique PA-C       AS OF 18:46 EDT VITALS:    BP - 111/91  HR - 81  TEMP - 98 °F (36.7 °C) (Oral)  02 SATS - 98%      No follow-up provider specified.       Medication List      No changes were made to your prescriptions during this visit.         I rechecked the patient.  I discussed the patient's labs, radiology findings (including all incidental findings), diagnosis, and plan for admission. The patient understands and agrees with the plan.    DIAGNOSIS  Final diagnoses:   Frequent PVCs   Lightheadedness   Nonsustained ventricular tachycardia         DISPOSITION  Admit    Pt masked in first look. I wore a surgical mask throughout my encounters with the pt. I performed hand hygiene on entry into the pt  room and upon exit.     Dictated utilizing Dragon dictation     Note Disclaimer: At [facility], we believe that sharing information builds trust and better relationships. You are receiving this note because you recently visited [facility]. It is possible you will see health information before a provider has talked with you about it. This kind of information can be easy to misunderstand. To help you fully understand what it means for your health, we urge you to discuss this note with your provider.      Jason Manrique PAIsisC  06/12/25 1848

## 2025-06-13 PROBLEM — R00.2 PALPITATIONS: Status: RESOLVED | Noted: 2018-08-20 | Resolved: 2025-06-13

## 2025-06-13 PROBLEM — R06.02 SHORTNESS OF BREATH: Status: RESOLVED | Noted: 2025-05-19 | Resolved: 2025-06-13

## 2025-06-13 PROBLEM — I50.21 ACUTE SYSTOLIC HEART FAILURE: Status: RESOLVED | Noted: 2025-04-30 | Resolved: 2025-06-13

## 2025-06-13 PROBLEM — I48.91 ATRIAL FIBRILLATION WITH RVR: Status: RESOLVED | Noted: 2025-04-28 | Resolved: 2025-06-13

## 2025-06-13 PROBLEM — R22.43 LOCALIZED SWELLING OF BOTH LOWER LEGS: Status: RESOLVED | Noted: 2025-05-07 | Resolved: 2025-06-13

## 2025-06-13 PROBLEM — R60.0 LOCALIZED EDEMA: Status: RESOLVED | Noted: 2021-06-08 | Resolved: 2025-06-13

## 2025-06-13 PROBLEM — I49.3 PVC'S (PREMATURE VENTRICULAR CONTRACTIONS): Status: RESOLVED | Noted: 2018-01-09 | Resolved: 2025-06-13

## 2025-06-13 PROBLEM — R00.1 BRADYCARDIA: Status: RESOLVED | Noted: 2021-06-08 | Resolved: 2025-06-13

## 2025-06-13 PROBLEM — J40 BRONCHITIS: Status: RESOLVED | Noted: 2022-12-29 | Resolved: 2025-06-13

## 2025-06-13 LAB
ANION GAP SERPL CALCULATED.3IONS-SCNC: 9.9 MMOL/L (ref 5–15)
BUN SERPL-MCNC: 16 MG/DL (ref 6–20)
BUN/CREAT SERPL: 16.3 (ref 7–25)
CALCIUM SPEC-SCNC: 9.4 MG/DL (ref 8.6–10.5)
CHLORIDE SERPL-SCNC: 103 MMOL/L (ref 98–107)
CO2 SERPL-SCNC: 27.1 MMOL/L (ref 22–29)
CREAT SERPL-MCNC: 0.98 MG/DL (ref 0.76–1.27)
EGFRCR SERPLBLD CKD-EPI 2021: 90.5 ML/MIN/1.73
GLUCOSE SERPL-MCNC: 120 MG/DL (ref 65–99)
MAGNESIUM SERPL-MCNC: 2.5 MG/DL (ref 1.6–2.6)
POTASSIUM SERPL-SCNC: 3.6 MMOL/L (ref 3.5–5.2)
QT INTERVAL: 379 MS
QTC INTERVAL: 475 MS
SODIUM SERPL-SCNC: 140 MMOL/L (ref 136–145)

## 2025-06-13 PROCEDURE — 83735 ASSAY OF MAGNESIUM: CPT | Performed by: INTERNAL MEDICINE

## 2025-06-13 PROCEDURE — 99222 1ST HOSP IP/OBS MODERATE 55: CPT

## 2025-06-13 PROCEDURE — 99222 1ST HOSP IP/OBS MODERATE 55: CPT | Performed by: INTERNAL MEDICINE

## 2025-06-13 PROCEDURE — 80048 BASIC METABOLIC PNL TOTAL CA: CPT | Performed by: INTERNAL MEDICINE

## 2025-06-13 RX ORDER — ZOLPIDEM TARTRATE 5 MG/1
5 TABLET ORAL NIGHTLY PRN
Status: DISCONTINUED | OUTPATIENT
Start: 2025-06-13 | End: 2025-06-15 | Stop reason: HOSPADM

## 2025-06-13 RX ORDER — METOPROLOL SUCCINATE 25 MG/1
25 TABLET, EXTENDED RELEASE ORAL
Status: DISCONTINUED | OUTPATIENT
Start: 2025-06-13 | End: 2025-06-15 | Stop reason: HOSPADM

## 2025-06-13 RX ORDER — AMIODARONE HYDROCHLORIDE 200 MG/1
200 TABLET ORAL 2 TIMES DAILY WITH MEALS
Status: DISCONTINUED | OUTPATIENT
Start: 2025-06-13 | End: 2025-06-15 | Stop reason: HOSPADM

## 2025-06-13 RX ADMIN — POTASSIUM CHLORIDE 10 MEQ: 750 TABLET, EXTENDED RELEASE ORAL at 09:02

## 2025-06-13 RX ADMIN — AMIODARONE HYDROCHLORIDE 200 MG: 200 TABLET ORAL at 17:32

## 2025-06-13 RX ADMIN — AMIODARONE HYDROCHLORIDE 200 MG: 200 TABLET ORAL at 10:49

## 2025-06-13 RX ADMIN — FUROSEMIDE 20 MG: 40 TABLET ORAL at 12:56

## 2025-06-13 RX ADMIN — PANTOPRAZOLE SODIUM 40 MG: 40 TABLET, DELAYED RELEASE ORAL at 20:52

## 2025-06-13 RX ADMIN — METOPROLOL SUCCINATE 25 MG: 25 TABLET, EXTENDED RELEASE ORAL at 17:32

## 2025-06-13 RX ADMIN — FUROSEMIDE 40 MG: 40 TABLET ORAL at 09:02

## 2025-06-13 RX ADMIN — POTASSIUM CHLORIDE 10 MEQ: 750 TABLET, EXTENDED RELEASE ORAL at 20:52

## 2025-06-13 RX ADMIN — PANTOPRAZOLE SODIUM 40 MG: 40 TABLET, DELAYED RELEASE ORAL at 09:02

## 2025-06-13 RX ADMIN — LOSARTAN POTASSIUM 50 MG: 50 TABLET, FILM COATED ORAL at 09:04

## 2025-06-13 RX ADMIN — RIVAROXABAN 20 MG: 20 TABLET, FILM COATED ORAL at 17:32

## 2025-06-13 RX ADMIN — ZOLPIDEM TARTRATE 5 MG: 5 TABLET ORAL at 21:42

## 2025-06-13 NOTE — H&P
Date of Hospital Visit: 25  Encounter Provider: Gretchen Miguel RN  Place of Service: [facility] CARDIOLOGY  Patient Name: Jaime Lyle Jr.  :1970  Referral Provider: Malorie Conti MD    Chief complaint    History of Present Illness    Past Medical History:   Diagnosis Date   • Abnormal ECG     Caffeine induced pvcs   • Acid reflux    • Acute medial meniscus tear     left knee   • Arrhythmia    • Atrial fibrillation    • Community acquired pneumonia    • Hyperlipidemia    • Hypertension    • Mild mitral regurgitation    • Obesity (BMI 35.0-39.9 without comorbidity)    • STEVE (obstructive sleep apnea)    • Pulmonic regurgitation     trace   • PVC's (premature ventricular contractions)    • Tricuspid regurgitation     trace       Past Surgical History:   Procedure Laterality Date   • CARDIAC CATHETERIZATION N/A 2025    Procedure: Left Heart Cath;  Surgeon: Ray Castellanos MD;  Location: [facility] CATH INVASIVE LOCATION;  Service: Cardiovascular;  Laterality: N/A;   • LAPAROSCOPIC GASTRIC BANDING     • ROTATOR CUFF REPAIR         Prior to Admission medications    Medication Sig Start Date End Date Taking? Authorizing Provider   furosemide (LASIX) 40 MG tablet Take 40mg po in am and 20mg (1/2 tab) at noon 25  Yes Maureen Archuleta APRN   hydrOXYzine (ATARAX) 10 MG tablet Take 1-2 tablets by mouth Every 8 (Eight) Hours As Needed for Anxiety. 25  Yes Maureen Archuleta APRN   losartan (Cozaar) 50 MG tablet Take 1 tablet by mouth Daily. 25  Yes Tabby Hinton APRN   pantoprazole (PROTONIX) 40 MG EC tablet TAKE 1 TABLET BY MOUTH 2 TIMES A DAY 3/13/25  Yes Zehra Shen APRN   potassium chloride 10 MEQ CR tablet Take 1 tablet by mouth 2 (Two) Times a Day. 25  Yes Maureen Archuleta APRN   rivaroxaban (XARELTO) 20 MG tablet Take 1 tablet by mouth Daily With Dinner. Indications: Atrial Fibrillation 25  Yes Maureen Archuleta APRN  "  albuterol sulfate  (90 Base) MCG/ACT inhaler Inhale 2 puffs Every 4 (Four) Hours As Needed for Wheezing. 1/24/25   Zehra Shen APRN   atenolol (TENORMIN) 25 MG tablet Take 1 tablet by mouth Every 12 (Twelve) Hours for 30 days. 5/19/25 6/18/25  Deyanira Vides APRN       Social History     Socioeconomic History   • Marital status:    Tobacco Use   • Smoking status: Light Smoker     Types: Cigars     Passive exposure: Current   • Smokeless tobacco: Never   • Tobacco comments:     1 cigar monthly   Vaping Use   • Vaping status: Never Used   Substance and Sexual Activity   • Alcohol use: Yes     Comment: caffeine use   • Drug use: Never   • Sexual activity: Defer       Family History   Problem Relation Age of Onset   • Heart disease Mother 50   • Arrhythmia Mother    • Diabetes Father    • Sudden death Maternal Uncle 52   • Stroke Maternal Grandmother 50   • Diabetes Paternal Grandmother    • Diabetes Paternal Grandfather    • Hypertension Other    • Heart disease Other         ischemic   • Diabetes Other        Review of Systems     Objective:     Vitals:    06/12/25 1713 06/12/25 1805 06/12/25 1959 06/13/25 0421   BP: 123/99 111/91 120/62 105/60   BP Location:  Left arm Right arm Right arm   Patient Position:  Lying Lying Lying   Pulse: 96 81 99 75   Resp:  16 16 16   Temp:  98 °F (36.7 °C) 98.4 °F (36.9 °C) 97.9 °F (36.6 °C)   TempSrc:  Oral Oral Oral   SpO2: 95% 98% 93% 95%   Weight:  (!) 141 kg (311 lb 4.8 oz)     Height:  182 cm (71.65\")       Body mass index is 42.63 kg/m².  Flowsheet Rows      Flowsheet Row First Filed Value   Admission Height 182.9 cm (72\") Documented at 06/12/2025 1708   Admission Weight 142 kg (314 lb) Documented at 06/12/2025 1708            Physical Exam            Lab Review:                Results from last 7 days   Lab Units 06/13/25  0240   SODIUM mmol/L 140   POTASSIUM mmol/L 3.6   CHLORIDE mmol/L 103   CO2 mmol/L 27.1   BUN mg/dL 16.0   CREATININE mg/dL 0.98 "   GLUCOSE mg/dL 120*   CALCIUM mg/dL 9.4     Results from last 7 days   Lab Units 06/12/25  1618 06/12/25  1432   HSTROP T ng/L 19 20     Results from last 7 days   Lab Units 06/12/25  1432   WBC 10*3/mm3 9.88   HEMOGLOBIN g/dL 16.5   HEMATOCRIT % 49.8   PLATELETS 10*3/mm3 257             Results from last 7 days   Lab Units 06/13/25  0240   MAGNESIUM mg/dL 2.5                       I personally viewed and interpreted the patient's EKG/Telemetry data    Assessment/Plan:         Nonsustained ventricular tachycardia       Sincerely,    Gretchen Miguel, RN

## 2025-06-13 NOTE — CONSULTS
Electrophysiology Hospital Consult            Patient Name: Jaime Lyle Jr.  Age/Sex: 54 y.o. male  : 1970  MRN: [id number]    Date of Admission: 2025  Date of Encounter Visit: 25  Encounter Provider: RIKKI Sanderson  Referring Provider: Malorie Conti MD  Place of Service: [facility] CARDIOLOGY  Patient Care Team:  Zehra Shen APRN as PCP - General (Nurse Practitioner)  Chandra Nicholson MD as Consulting Physician (Cardiology)      Subjective:   EP Consultation for: PVCs, palpitations, nonischemic cardiomyopathy    Chief Complaint: Palpitations    History of Present Illness:  Jaime Lyle Jr. is a 54 y.o. male who follows with Dr. Conti.  He has a history of PVCs, persistent atrial fibrillation, and nonischemic cardiomyopathy.    He presented to the ED in early May and was noted to be in A-fib with RVR which was new for him.  Echo showed a EF of 41 to 45% which was felt to be tachycardia induced cardiomyopathy.  Rate control was attempted.    He underwent normal heart cath at that time.    He developed worsening symptoms and presented back to the ED a few weeks later and was still in A-fib.  We elected to cardiovert him at that time.    He was seen in the office for an EKG in early  and was noted to be in sinus rhythm.  He was bradycardic so his atenolol was stopped.    He had an echo earlier this week which showed a decline in his EF to 36% Dr. Conti called and increased his losartan to try to maximize his medical therapy.      He presented to the ED last night with worsening palpitations for the last couple of days.  He was noted to have frequent PVCs and occasional NSVT.    EP has been asked to evaluate.      Dr. Cornelius I saw him.    He says the past few days has been having worsening palpitations.  He said it was similar to his PVCs in the past so that is what he suspected.    He was also worried about recurrent A-fib.    This morning.  He  is not having as frequent PVCs as he was when he came in.    Overall he says that he feels well and his symptoms have subsided.    Past Medical History:  Past Medical History:   Diagnosis Date    Abnormal ECG 2010    Caffeine induced pvcs    Acid reflux     Acute medial meniscus tear     left knee    Arrhythmia 2010    Atrial fibrillation 2025    Community acquired pneumonia     Hyperlipidemia 2021    Hypertension 2025    Mild mitral regurgitation     Obesity (BMI 35.0-39.9 without comorbidity)     STEVE (obstructive sleep apnea)     Pulmonic regurgitation     trace    PVC's (premature ventricular contractions)     Tricuspid regurgitation     trace       Past Surgical History:   Procedure Laterality Date    CARDIAC CATHETERIZATION N/A 5/2/2025    Procedure: Left Heart Cath;  Surgeon: Ray Castellanos MD;  Location: [facility] CATH INVASIVE LOCATION;  Service: Cardiovascular;  Laterality: N/A;    LAPAROSCOPIC GASTRIC BANDING      ROTATOR CUFF REPAIR         Home Medications:   Medications Prior to Admission   Medication Sig Dispense Refill Last Dose/Taking    furosemide (LASIX) 40 MG tablet Take 40mg po in am and 20mg (1/2 tab) at noon 180 tablet 1 6/12/2025    hydrOXYzine (ATARAX) 10 MG tablet Take 1-2 tablets by mouth Every 8 (Eight) Hours As Needed for Anxiety. 30 tablet 0 Taking As Needed    losartan (Cozaar) 50 MG tablet Take 1 tablet by mouth Daily.   6/12/2025    pantoprazole (PROTONIX) 40 MG EC tablet TAKE 1 TABLET BY MOUTH 2 TIMES A  tablet 0 6/12/2025 Morning    potassium chloride 10 MEQ CR tablet Take 1 tablet by mouth 2 (Two) Times a Day. 180 tablet 1 6/12/2025    rivaroxaban (XARELTO) 20 MG tablet Take 1 tablet by mouth Daily With Dinner. Indications: Atrial Fibrillation 90 tablet 1 6/11/2025    albuterol sulfate  (90 Base) MCG/ACT inhaler Inhale 2 puffs Every 4 (Four) Hours As Needed for Wheezing. 8 g 11     atenolol (TENORMIN) 25 MG tablet Take 1 tablet by mouth Every 12 (Twelve) Hours for 30  days. 60 tablet 0        Allergies:  Allergies   Allergen Reactions    Lisinopril-Hydrochlorothiazide Cough       Past Social History:  Social History     Socioeconomic History    Marital status:    Tobacco Use    Smoking status: Light Smoker     Types: Cigars     Passive exposure: Current    Smokeless tobacco: Never    Tobacco comments:     1 cigar monthly   Vaping Use    Vaping status: Never Used   Substance and Sexual Activity    Alcohol use: Yes     Comment: caffeine use    Drug use: Never    Sexual activity: Defer       Past Family History:  Family History   Problem Relation Age of Onset    Heart disease Mother 50    Arrhythmia Mother     Diabetes Father     Sudden death Maternal Uncle 52    Stroke Maternal Grandmother 50    Diabetes Paternal Grandmother     Diabetes Paternal Grandfather     Hypertension Other     Heart disease Other         ischemic    Diabetes Other        Review of Systems: All systems reviewed. Pertinent positives identified in HPI. All other systems are negative.     14 point ROS was performed and is negative except as outlined in HPI.     Objective:     Objective:  Vital Signs (last 24 hours)         06/12 0700  06/13 0659 06/13 0700  06/13 1111   Most Recent      Temp (°F) 97.9 -  98.4      98.2     98.2 (36.8) 06/13 0903    Heart Rate 49 -  99    74 -  84     84 06/13 1049    Resp 16 -  18      17     17 06/13 0903    /60 -  127/65    123/85 -  137/91     123/85 06/13 1049    SpO2 (%) 93 -  98       95 06/13 0421          Temp:  [97.9 °F (36.6 °C)-98.4 °F (36.9 °C)] 98.2 °F (36.8 °C)  Heart Rate:  [49-99] 84  Resp:  [16-18] 17  BP: (105-137)/() 123/85  Body mass index is 42.63 kg/m².        Physical Exam:     General Appearance: No acute distress, well developed and well nourished.   Respiratory: No signs of respiratory distress. Respiration rhythm and depth normal.   Cardiovascular:  Heart Rate and Rhythm: Normal, Heart Sounds: Normal S1 and S2. No S3 or S4  noted  Skin: Warm and dry.   Psychiatric: Patient alert and oriented to person, place, and time. Speech and behavior appropriate. Normal mood and affect.    Labs:   Lab Review:     Results from last 7 days   Lab Units 06/13/25  0240 06/12/25  1432 06/10/25  0932   SODIUM mmol/L 140 139 140   POTASSIUM mmol/L 3.6 3.6 4.0   CHLORIDE mmol/L 103 103 102   CO2 mmol/L 27.1 24.0 25.9   BUN mg/dL 16.0 13.0 14.0   CREATININE mg/dL 0.98 0.94 0.99   GLUCOSE mg/dL 120* 123* 99   CALCIUM mg/dL 9.4 9.4 9.7   AST (SGOT) U/L  --  20 21   ALT (SGPT) U/L  --  22 30     Results from last 7 days   Lab Units 06/12/25  1618 06/12/25  1432   HSTROP T ng/L 19 20     Results from last 7 days   Lab Units 06/12/25  1432   WBC 10*3/mm3 9.88   HEMOGLOBIN g/dL 16.5   HEMATOCRIT % 49.8   PLATELETS 10*3/mm3 257             Results from last 7 days   Lab Units 06/13/25  0240   MAGNESIUM mg/dL 2.5         Results from last 7 days   Lab Units 06/10/25  0932   PROBNP pg/mL 357.0             EKG:         I personally viewed and interpreted the patient's EKG/Telemetry tracings.    Assessment:       Nonsustained ventricular tachycardia        Plan:   He is having more frequent PVCs and episodes of bigeminy that brought him to the emergency room.  We recommended amiodarone 200 mg twice daily to try to suppress the PVCs.    We discussed and recommended trying to maximize his GDMT for his nonischemic cardiomyopathy as his EF is still down despite being in sinus rhythm.    We will try to suppress the PVCs and increase his medical therapy.  Will need to at least give medications 3 months and repeat echo.      We can have him follow-up in EP clinic and continue to assess his cardiomyopathy being forward.    Recommend monitoring him at least another 24 hours on amiodarone to evaluate for suppression of PVCs.    If he has frequent NSVT then could consider amiodarone bolus.     EP will continue to follow along while he is here.         Thank you for allowing me  to participate in the care of Jaime Lyle Jr.. Feel free to contact me directly with any further questions or concerns.    RIKKI Sanderson  [facility]  06/13/25  11:11 EDT

## 2025-06-13 NOTE — H&P
Date of Hospital Visit: 25  Encounter Provider: Thai Alvarez MD  Place of Service: [facility] CARDIOLOGY  Patient Name: Jaime Lyle Jr.  :1970  Referral Provider: Malorie Conti MD    Chief complaint: palpitations     History of Present Illness     Mr Lyle is a 53yo man who follows with Dr Conti. He has obesity, HTN, STEVE, persistent AF, and long standing PVCs.    He was admitted two months ago with a URI and was noted to be in rapid AF. An echo revealed an EF of 40-45%. He was started on metoprolol, rivaroxaban, and losartan. HCTZ was stopped.    He was admitted a few weeks later with symptomatic tachycardia and SOA, Coronary angiography revealed luminal irregularities. His EF had declined to 35-40%. He was loaded with digoxin and ultimately underwent cardioversion.    Since then, his PVCs, which he has had since he was a teenager, but they have been much more symptomatic since the spring. He came to the ED and was noted to have very frequent PVCs and runs of NSVT. EP has seen him and started him on amiodarone.    He denies edema, orthopnea, PND, LH, or syncope. He denies chest pain.    ECHO 25      Left ventricular systolic function is moderately decreased. Calculated left ventricular EF = 36.5%    The left ventricular cavity is moderately dilated.    Left ventricular wall thickness is consistent with mild concentric hypertrophy.    The following left ventricular wall segments are hypokinetic: mid anterior, apical anterior, basal anterolateral, mid anterolateral, basal inferolateral, mid inferolateral and basal anterior.    Left ventricular diastolic function is consistent with (grade II w/high LAP) pseudonormalization.    Left atrial volume is moderately increased.    Estimated right ventricular systolic pressure from tricuspid regurgitation is normal (<35 mmHg). Calculated right ventricular systolic pressure from tricuspid regurgitation is 16 mmHg.       CATH  5/2/25    Findings:  1. Coronary Artery Anatomy:  Dominance: Right  Left Main: Angiographically normal  Left Anterior Descending: Moderate in caliber size.  Contains a scattered 20 to 30% segment stenoses throughout the proximal, mid and distal segments supplies a proximal mid and distal diagonal branches containing luminal regularities  Circumflex Artery: Moderate in caliber size.  Supplies a high mid and inferior marginal branch containing luminal regularities throughout  Right Coronary Artery: Moderate in caliber size contains luminal regularities throughout supplies a PDA and posterior lateral branch     2. Hemodynamics:  Left Ventricle: 72/10/13 mmHg  Aorta: 90/83/88 mmHg     Conclusions:  Mild coronary artery disease with 20 - 30% segment stenoses throughout the proximal to distal LAD  LVEDP relatively normal at 13 mmHg     Recommendations:   Would optimize rate control according to EP's recommend       Stress test 5/2/25      Left ventricular ejection fraction is severely reduced (Calculated EF = 12%).  This may be inaccurate due to gating issues with atrial fibrillation.    This was stress only.  The patient became claustrophobic and had to get out of the scanner.    There were perfusion defects noted in the inferior apex and in the mid and basal lateral wall.    Results discussed with Dr. Conti.     Past Medical History:   Diagnosis Date    Acid reflux     Acute medial meniscus tear     left knee    Atrial fibrillation 2025    Community acquired pneumonia     Hyperlipidemia 2021    Hypertension 2025    Morbid obesity     STEVE (obstructive sleep apnea)     Personal history of gastric banding 01/10/2024    Pulmonic regurgitation     trace    PVCs (premature ventricular contractions)     Vitamin D deficiency 09/11/2024       Past Surgical History:   Procedure Laterality Date    CARDIAC CATHETERIZATION N/A 5/2/2025    Procedure: Left Heart Cath;  Surgeon: Ray Castellanos MD;  Location: [facility] INVASIVE  LOCATION;  Service: Cardiovascular;  Laterality: N/A;    LAPAROSCOPIC GASTRIC BANDING      ROTATOR CUFF REPAIR         Prior to Admission medications    Medication Sig Start Date End Date Taking? Authorizing Provider   furosemide (LASIX) 40 MG tablet Take 40mg po in am and 20mg (1/2 tab) at noon 5/29/25  Yes Maurene Archuleta APRN   hydrOXYzine (ATARAX) 10 MG tablet Take 1-2 tablets by mouth Every 8 (Eight) Hours As Needed for Anxiety. 5/29/25  Yes Maureen Archuleta APRN   losartan (Cozaar) 50 MG tablet Take 1 tablet by mouth Daily. 6/12/25  Yes Tabby Hinton APRN   pantoprazole (PROTONIX) 40 MG EC tablet TAKE 1 TABLET BY MOUTH 2 TIMES A DAY 3/13/25  Yes Zehra Shen APRN   potassium chloride 10 MEQ CR tablet Take 1 tablet by mouth 2 (Two) Times a Day. 5/29/25  Yes Maureen Archuleta APRN   rivaroxaban (XARELTO) 20 MG tablet Take 1 tablet by mouth Daily With Dinner. Indications: Atrial Fibrillation 5/29/25  Yes Maureen Archuleta APRN   albuterol sulfate  (90 Base) MCG/ACT inhaler Inhale 2 puffs Every 4 (Four) Hours As Needed for Wheezing. 1/24/25   Zehra Shen APRN   atenolol (TENORMIN) 25 MG tablet Take 1 tablet by mouth Every 12 (Twelve) Hours for 30 days. 5/19/25 6/18/25  Deyanira Vides APRN       Social History     Socioeconomic History    Marital status:    Tobacco Use    Smoking status: Light Smoker     Types: Cigars     Passive exposure: Current    Smokeless tobacco: Never    Tobacco comments:     1 cigar monthly   Vaping Use    Vaping status: Never Used   Substance and Sexual Activity    Alcohol use: Yes     Comment: caffeine use    Drug use: Never    Sexual activity: Defer       Family History   Problem Relation Age of Onset    Heart disease Mother 50    Arrhythmia Mother     Diabetes Father     Sudden death Maternal Uncle 52    Stroke Maternal Grandmother 50    Diabetes Paternal Grandmother     Diabetes Paternal Grandfather     Hypertension Other     Heart disease Other         " ischemic    Diabetes Other        Review of Systems   Constitutional:  Positive for fatigue and unexpected weight change.   Cardiovascular:  Positive for palpitations.        Objective:     Vitals:    06/13/25 0903 06/13/25 1049 06/13/25 1215 06/13/25 1256   BP: 137/91 123/85 98/55 110/69   BP Location:   Right arm    Patient Position: Lying  Lying    Pulse: 74 84 70 76   Resp: 17  16    Temp: 98.2 °F (36.8 °C)  98 °F (36.7 °C)    TempSrc: Oral  Oral    SpO2:       Weight:       Height:         Body mass index is 42.63 kg/m².  Flowsheet Rows      Flowsheet Row First Filed Value   Admission Height 182.9 cm (72\") Documented at 06/12/2025 1708   Admission Weight 142 kg (314 lb) Documented at 06/12/2025 1708            Physical Exam  Vitals reviewed.   Constitutional:       Comments: obese   HENT:      Head: Normocephalic.      Nose: Nose normal.      Mouth/Throat:      Pharynx: Oropharynx is clear.   Eyes:      Conjunctiva/sclera: Conjunctivae normal.   Cardiovascular:      Rate and Rhythm: Normal rate and regular rhythm. Occasional Extrasystoles are present.     Pulses: Normal pulses.      Heart sounds: Normal heart sounds.   Pulmonary:      Effort: Pulmonary effort is normal.      Breath sounds: Normal breath sounds.   Abdominal:      Palpations: Abdomen is soft.      Comments: Cannot feel organs or aorta   Musculoskeletal:         General: No swelling. Normal range of motion.      Cervical back: Normal range of motion.   Skin:     General: Skin is warm and dry.      Findings: No erythema.   Neurological:      General: No focal deficit present.      Mental Status: He is alert.   Psychiatric:         Mood and Affect: Mood normal.         Behavior: Behavior normal.         Thought Content: Thought content normal.                 Lab Review:                Results from last 7 days   Lab Units 06/13/25  0240   SODIUM mmol/L 140   POTASSIUM mmol/L 3.6   CHLORIDE mmol/L 103   CO2 mmol/L 27.1   BUN mg/dL 16.0   CREATININE " mg/dL 0.98   GLUCOSE mg/dL 120*   CALCIUM mg/dL 9.4     Results from last 7 days   Lab Units 06/12/25  1618 06/12/25  1432   HSTROP T ng/L 19 20     Results from last 7 days   Lab Units 06/12/25  1432   WBC 10*3/mm3 9.88   HEMOGLOBIN g/dL 16.5   HEMATOCRIT % 49.8   PLATELETS 10*3/mm3 257             Results from last 7 days   Lab Units 06/13/25  0240   MAGNESIUM mg/dL 2.5                         I personally viewed and interpreted the patient's EKG/Telemetry data    Assessment/Plan:     1. Frequent PVCs  2. NSVT  3. NICM, EF 35-40%  4. Persistent AF s/p cardioversion    *Continue amiodarone per EP  *Change atenolol to metoprolol succinate, change losartan to sac-marnie, continue furosemide  *Consider spironolactone and/or SGLT2i depending on BP     Sincerely,    Thai Alvarez MD

## 2025-06-13 NOTE — PAYOR COMM NOTE
"Diandra Orosco Jr. (54 y.o. Male)                                     ATTENTION; INITIAL CLINICALS AUTH PENDING [id number]                                    REPLY TO [facility] DEPT  [phone] OR CALL              Date of Birth   1970    Social Security Number       Address   [address] [place] [address]    Home Phone   [phone]    MRN   [id number]       [employer]   [employer]    Marital Status                               Admission Date   6/12/2025    Admission Type   Emergency    Admitting Provider   Malorie Conti MD    Attending Provider   Malorie Conti MD    Department, Room/Bed   [facility] CARDIOVASC UNIT, 2216/1       Discharge Date       Discharge Disposition       Discharge Destination                                 Attending Provider: Malorie Conti MD    Allergies: Lisinopril-hydrochlorothiazide    Isolation: None   Infection: None   Code Status: Prior    Ht: 182 cm (71.65\")   Wt: 141 kg (311 lb 4.8 oz)    Admission Cmt: None   Principal Problem: Nonsustained ventricular tachycardia [I47.29]                   Active Insurance as of 6/12/2025       Primary Coverage       Payor Plan Insurance Group Employer/Plan Group    Formerly [facility] BLUE CROSS [id number]       Payor Plan Address Payor Plan Phone Number Payor Plan Fax Number Effective Dates    [address]   1/11/2004 - None Entered    [place] [address]         Subscriber Name Subscriber Birth Date Member ID       DIANDRA OROSCO JR. 1970 [id number]                     Emergency Contacts        (Rel.) Home Phone Work Phone Mobile Phone    Mady Orosco (Spouse) -- -- [phone]              History & Physical    SEE CONSULT NOTE -QUIRINO RDZ MD / MARK BRANDT            Emergency Department Notes        Caty Elder, RN at 06/12/25 1720          Nursing report ED to floor  Diandra Orosco JrSadia  54 y.o.  male    HPI :  HPI  Stated Reason for Visit: palpitations, " "nausea, vomiting,  History Obtained From: patient    Chief Complaint  Chief Complaint   Patient presents with    Palpitations       Admitting doctor:   Malorie Conti MD    Admitting diagnosis:   The primary encounter diagnosis was Frequent PVCs. Diagnoses of Lightheadedness and Nonsustained ventricular tachycardia were also pertinent to this visit.    Code status:   Current Code Status       Date Active Code Status Order ID Comments User Context       Prior            Allergies:   Lisinopril-hydrochlorothiazide    Isolation:   No active isolations    Intake and Output  No intake or output data in the 24 hours ending 06/12/25 1720    Weight:       06/12/25  1708   Weight: (!) 142 kg (314 lb)       Most recent vitals:   Vitals:    06/12/25 1600 06/12/25 1704 06/12/25 1707 06/12/25 1708   BP: 109/63 117/83 (!) 125/101 (!) 125/101   BP Location:    Right arm   Patient Position:    Lying   Pulse: 89 90 95 96   Resp:    18   Temp:       SpO2: 95% 97% 96% 97%   Weight:    (!) 142 kg (314 lb)   Height:    182.9 cm (72\")       Active LDAs/IV Access:   Lines, Drains & Airways       Active LDAs       Name Placement date Placement time Site Days    Peripheral IV 05/18/25 2250 20 G Left Antecubital 05/18/25  2250  Antecubital  24    Peripheral IV 06/12/25 1638 20 G Anterior;Left;Proximal Forearm 06/12/25  1638  Forearm  less than 1                    Labs (abnormal labs have a star):   Labs Reviewed   COMPREHENSIVE METABOLIC PANEL - Abnormal; Notable for the following components:       Result Value    Glucose 123 (*)     All other components within normal limits    Narrative:     GFR Categories in Chronic Kidney Disease (CKD)              GFR Category          GFR (mL/min/1.73)    Interpretation  G1                    90 or greater        Normal or high (1)  G2                    60-89                Mild decrease (1)  G3a                   45-59                Mild to moderate decrease  G3b                   30-44                " Moderate to severe decrease  G4                    15-29                Severe decrease  G5                    14 or less           Kidney failure    (1)In the absence of evidence of kidney disease, neither GFR category G1 or G2 fulfill the criteria for CKD.    eGFR calculation 2021 CKD-EPI creatinine equation, which does not include race as a factor   CBC WITH AUTO DIFFERENTIAL - Abnormal; Notable for the following components:    RBC 6.00 (*)     Immature Grans % 0.6 (*)     Immature Grans, Absolute 0.06 (*)     All other components within normal limits   TROPONIN - Normal    Narrative:     High Sensitive Troponin T Reference Range:  <14.0 ng/L- Negative Female for AMI  <22.0 ng/L- Negative Male for AMI  >=14 - Abnormal Female indicating possible myocardial injury.  >=22 - Abnormal Male indicating possible myocardial injury.   Clinicians would have to utilize clinical acumen, EKG, Troponin, and serial changes to determine if it is an Acute Myocardial Infarction or myocardial injury due to an underlying chronic condition.        HIGH SENSITIVITIY TROPONIN T 1HR - Normal    Narrative:     High Sensitive Troponin T Reference Range:  <14.0 ng/L- Negative Female for AMI  <22.0 ng/L- Negative Male for AMI  >=14 - Abnormal Female indicating possible myocardial injury.  >=22 - Abnormal Male indicating possible myocardial injury.   Clinicians would have to utilize clinical acumen, EKG, Troponin, and serial changes to determine if it is an Acute Myocardial Infarction or myocardial injury due to an underlying chronic condition.        MAGNESIUM - Normal   RAINBOW DRAW    Narrative:     The following orders were created for panel order [place] Draw.  Procedure                               Abnormality         Status                     ---------                               -----------         ------                     Green Top (Gel)[[id number]]                                  Final result               Lavender  Top[[id number]]                                     Final result               Gold Top - SST[[id number]]                                   Final result               Light Blue Top[[id number]]                                   Final result                 Please view results for these tests on the individual orders.   CBC AND DIFFERENTIAL    Narrative:     The following orders were created for panel order CBC & Differential.  Procedure                               Abnormality         Status                     ---------                               -----------         ------                     CBC Auto Differential[[id number]]        Abnormal            Final result                 Please view results for these tests on the individual orders.   GREEN TOP   LAVENDER TOP   GOLD TOP - SST   LIGHT BLUE TOP       EKG:   ECG 12 Lead ED Triage Standing Order; Chest Pain   Preliminary Result   HEART RATE=96  bpm   RR Xkujnzmv=294  ms   TN Qhgzeujr=950  ms   P Horizontal Axis=5  deg   P Front Axis=55  deg   QRSD Gaqnmkns=997  ms   QT Hfjawsrf=105  ms   NYcA=693  ms   QRS Axis=-30  deg   T Wave Axis=127  deg   - ABNORMAL ECG -   Sinus rhythm   Paired ventricular premature complexes   Borderline IVCD with LAD   Anterior infarct, old   Nonspecific T abnormalities, lateral leads   When compared with ECG of 19-May-2025 05:21:48,   Nonspecific significant change   Date and Time of Study:2025-06-12 14:29:44          Meds given in ED:   Medications   sodium chloride 0.9 % flush 10 mL (has no administration in time range)   aspirin tablet 325 mg (325 mg Oral Not Given 6/12/25 1630)   nitroglycerin (NITROSTAT) SL tablet 0.4 mg (has no administration in time range)   furosemide (LASIX) tablet 40 mg (has no administration in time range)   furosemide (LASIX) tablet 20 mg (has no administration in time range)   losartan (COZAAR) tablet 50 mg (has no administration in time range)   pantoprazole (PROTONIX) EC tablet 40 mg (has no  administration in time range)   potassium chloride (K-DUR,KLOR-CON) ER tablet 10 mEq (has no administration in time range)   rivaroxaban (XARELTO) tablet 20 mg (has no administration in time range)   albuterol (PROVENTIL) nebulizer solution 0.083% 2.5 mg/3mL (has no administration in time range)   potassium chloride (K-DUR,KLOR-CON) ER tablet 10 mEq (10 mEq Oral Given 6/12/25 1633)       Imaging results:  XR Chest 1 View  Result Date: 6/12/2025  No focal pulmonary consolidation. Follow-up as clinical indications persist.  This report was finalized on 6/12/2025 3:09 PM by Dr. Valdo Wells M.D on Workstation: [id number]        Ambulatory status:   - Ad Makenzie    Social issues:   Social History     Socioeconomic History    Marital status:    Tobacco Use    Smoking status: Light Smoker     Types: Cigars     Passive exposure: Current    Smokeless tobacco: Never    Tobacco comments:     1 cigar monthly   Vaping Use    Vaping status: Never Used   Substance and Sexual Activity    Alcohol use: Yes     Comment: caffeine use    Drug use: Never    Sexual activity: Defer       Peripheral Neurovascular  Peripheral Neurovascular (Adult)  Peripheral Neurovascular WDL: WDL    Neuro Cognitive  Neuro Cognitive (Adult)  Cognitive/Neuro/Behavioral WDL: WDL    Learning  Learning Assessment  Learning Readiness and Ability: no barriers identified    Respiratory  Respiratory WDL  Respiratory WDL: WDL    Abdominal Pain       Pain Assessments  Pain (Adult)  (0-10) Pain Rating: Rest: 0  (0-10) Pain Rating: Activity: 0    NIH Stroke Scale       Caty Elder RN  06/12/25 17:20 EDT     Electronically signed by Caty Elder RN at 06/12/25 8700       Yarelis Sanabria MD at 06/12/25 1609          MD ATTESTATION NOTE    SHARED VISIT: This visit was performed by BOTH a physician and an APC. The substantive portion of the medical decision making was performed by this attesting physician who made or approved the management plan and takes  responsibility for patient management. All studies in the APC note (if performed) were independently interpreted by me.     The TONIA and I have discussed this patient's history, physical exam, and treatment plan.  I have reviewed the documentation and affirm the documentation and agree with the treatment and plan.  The attached note describes my personal findings.      Independent Historians: Patient    A complete HPI/ROS/PMH/PSH/SH/FH are unobtainable due to: None    Chronic or social conditions impacting patient care (social determinants of health): None    Jaimeurbano Lyle Jr. is a 54 y.o. male who presents to the ED c/o acute palpitations over last couple of weeks with long episode earlier today with sob, nausea, and near syncope. Pt has iphone 3 lead tracing of what appears to be vtach. Pt with long h/o pvcs and followed by EP cards as well as Dr. Conti.  Pt with elevated BP with medication changes recently including discontinuation of his atenolol and addition of losartan.  Pt had echo done yesterday with reduced EF from previous and cards appt with Dr. Conti moved up from Nov to next week. However, with patient's episodes of palpitations becoming longer he decided to come in for evaluation sooner.          On exam:  GENERAL: pleasant cooperative obese M, conversant, alert, no acute distress  SKIN: Warm, dry  HENT: Normocephalic, atraumatic  EYES: no scleral icterus, eomi  CV: irregular rhythm, regular rate  RESPIRATORY: normal effort, diminished at the bases with no wheezing  ABDOMEN: soft, nontender, nondistended  MUSCULOSKELETAL: no deformity, + le edema bilaterally  NEURO: alert, moves all extremities, follows commands                                                             Labs  Recent Results (from the past 24 hours)   ECG 12 Lead ED Triage Standing Order; Chest Pain    Collection Time: 06/12/25  2:29 PM   Result Value Ref Range    QT Interval 379 ms    QTC Interval 475 ms   Comprehensive Metabolic Panel     Collection Time: 06/12/25  2:32 PM    Specimen: Arm, Right; Blood   Result Value Ref Range    Glucose 123 (H) 65 - 99 mg/dL    BUN 13.0 6.0 - 20.0 mg/dL    Creatinine 0.94 0.76 - 1.27 mg/dL    Sodium 139 136 - 145 mmol/L    Potassium 3.6 3.5 - 5.2 mmol/L    Chloride 103 98 - 107 mmol/L    CO2 24.0 22.0 - 29.0 mmol/L    Calcium 9.4 8.6 - 10.5 mg/dL    Total Protein 7.5 6.0 - 8.5 g/dL    Albumin 4.1 3.5 - 5.2 g/dL    ALT (SGPT) 22 1 - 41 U/L    AST (SGOT) 20 1 - 40 U/L    Alkaline Phosphatase 73 39 - 117 U/L    Total Bilirubin 0.8 0.0 - 1.2 mg/dL    Globulin 3.4 gm/dL    A/G Ratio 1.2 g/dL    BUN/Creatinine Ratio 13.8 7.0 - 25.0    Anion Gap 12.0 5.0 - 15.0 mmol/L    eGFR 95.1 >60.0 mL/min/1.73   High Sensitivity Troponin T    Collection Time: 06/12/25  2:32 PM    Specimen: Arm, Right; Blood   Result Value Ref Range    HS Troponin T 20 <22 ng/L   Green Top (Gel)    Collection Time: 06/12/25  2:32 PM   Result Value Ref Range    Extra Tube Hold for add-ons.    Lavender Top    Collection Time: 06/12/25  2:32 PM   Result Value Ref Range    Extra Tube hold for add-on    Gold Top - SST    Collection Time: 06/12/25  2:32 PM   Result Value Ref Range    Extra Tube Hold for add-ons.    Light Blue Top    Collection Time: 06/12/25  2:32 PM   Result Value Ref Range    Extra Tube Hold for add-ons.    CBC Auto Differential    Collection Time: 06/12/25  2:32 PM    Specimen: Arm, Right; Blood   Result Value Ref Range    WBC 9.88 3.40 - 10.80 10*3/mm3    RBC 6.00 (H) 4.14 - 5.80 10*6/mm3    Hemoglobin 16.5 13.0 - 17.7 g/dL    Hematocrit 49.8 37.5 - 51.0 %    MCV 83.0 79.0 - 97.0 fL    MCH 27.5 26.6 - 33.0 pg    MCHC 33.1 31.5 - 35.7 g/dL    RDW 13.4 12.3 - 15.4 %    RDW-SD 40.6 37.0 - 54.0 fl    MPV 9.2 6.0 - 12.0 fL    Platelets 257 140 - 450 10*3/mm3    Neutrophil % 67.2 42.7 - 76.0 %    Lymphocyte % 25.3 19.6 - 45.3 %    Monocyte % 5.7 5.0 - 12.0 %    Eosinophil % 1.0 0.3 - 6.2 %    Basophil % 0.2 0.0 - 1.5 %    Immature Grans %  0.6 (H) 0.0 - 0.5 %    Neutrophils, Absolute 6.64 1.70 - 7.00 10*3/mm3    Lymphocytes, Absolute 2.50 0.70 - 3.10 10*3/mm3    Monocytes, Absolute 0.56 0.10 - 0.90 10*3/mm3    Eosinophils, Absolute 0.10 0.00 - 0.40 10*3/mm3    Basophils, Absolute 0.02 0.00 - 0.20 10*3/mm3    Immature Grans, Absolute 0.06 (H) 0.00 - 0.05 10*3/mm3    nRBC 0.0 0.0 - 0.2 /100 WBC       Radiology  XR Chest 1 View  Result Date: 6/12/2025  XR CHEST 1 VW-  HISTORY: Male who is 54 years-old, chest pain  TECHNIQUE: Frontal views of the chest  COMPARISON: 5/18/2025  FINDINGS: Heart size is normal. Generous cardiac fat pad is apparent. Pulmonary vasculature is unremarkable. No focal pulmonary consolidation, pleural effusion, or pneumothorax. No acute osseous process.      No focal pulmonary consolidation. Follow-up as clinical indications persist.  This report was finalized on 6/12/2025 3:09 PM by Dr. Valdo Wells M.D on Workstation: [id number]        Medical Decision Making:  ED Course as of 06/12/25 2323   Thu Jun 12, 2025   1610 Consulted with Dr. Conti,  with Cardiology    We discussed the patient's history and physical exam, along with pertinent lab and imaging findings.  Patient be admitted for further evaluation and treatment.  Dr Gill call Toni APRAMMY to see if patient has been taken off atenolol, may give 20 of potassium depending on conversation.  Patient will be admitted to Dr. Conti   [CV]   1611 HS Troponin T: 20 [CV]   1611 Glucose(!): 123 [CV]   1611 BUN: 13.0 [CV]   1611 Creatinine: 0.94 [CV]   1611 Sodium: 139 [CV]   1611 Potassium: 3.6 [CV]   1612 XR Chest 1 View  I reviewed the patient's chest x-ray in PACS  No evidence of a focal infiltrate [CV]      ED Course User Index  [CV] Jason Manrique, PAASHLEY       MDM: The differential diagnosis for generalized weakness or even near syncope/lightheadedness is quite broad and includes but is not limited to: hypoglycemia, orthostasis, arrhythmia, ACS, PE, electrolyte  disturbances, dka, renal failure, profound anemia, aortic dissection, severe aortic stenosis, gi bleeding, intoxication, myasthenia gravis, sepsis, and medication effects--among other possibilities.    Procedures:  Procedures        PPE: I followed hospital protocols for proper PPE based on patient presentation including use of N95 mask for suspected infectious respiratory conditions.  Proper hand hygiene was performed both before and after the patient encounter.          Diagnosis  Final diagnoses:   None       Note Disclaimer: At [facility], we believe that sharing information builds trust and better relationships. You are receiving this note because you recently visited [facility]. It is possible you will see health information before a provider has talked with you about it. This kind of information can be easy to misunderstand. To help you fully understand what it means for your health, we urge you to discuss this note with your provider.       Yarelis Sanabria MD  06/12/25 2332      Electronically signed by Yarelis Sanabria MD at 06/12/25 2332       Jason Manrique PA-C at 06/12/25 1539       Attestation signed by Yarelis Sanabria MD at 06/12/25 1912          SHARED APC FACE TO FACE: I performed a substantive part of the MDM during the patient's E/M visit. I personally evaluated and examined the patient. I personally made or approved the documented management plan and acknowledge its risk of complications.   Yarelis Sanabria MD 6/12/2025 19:12 EDT                              EMERGENCY DEPARTMENT ENCOUNTER    Room Number:  2216/1  Date of encounter:  6/12/2025  PCP: Zehra Shen APRN  Historian: Patient  Full history not obtainable due to: None    HPI:  Chief Complaint: Palpitations    Context: Jaime Lyle Jr. is a 54 y.o. male with a PMH significant for PVCs, obesity, palpitations, hypertension, edema, bradycardia, GERD, bronchitis A-fib RVR, acute systolic  heart failure, shortness of breath, chronic fatigue who presents to the ED c/o palpitations.      MEDICAL RECORD REVIEW:    Upon review of the medical record it appears the patient was evaluated 6/12/2025.  The patient had a transthoracic echocardiogram, showed calculated digit left ejection fraction was 36.5%.  Prior echo performed 4/29/25 showed left ventricular ejection fraction ranging from 41 to 45%.    PAST MEDICAL HISTORY    Active Ambulatory Problems     Diagnosis Date Noted    PVC's (premature ventricular contractions) 01/09/2018    Obesity (BMI 35.0-39.9 without comorbidity)     Cigar smoker 08/20/2018    Palpitations 08/20/2018    Tobacco use 06/08/2021    Essential hypertension 06/08/2021    Localized edema 06/08/2021    Preventative health care 06/08/2021    Bradycardia 06/08/2021    Screening for colon cancer 06/08/2021    Screening for malignant neoplasm of prostate 06/08/2021    Hyperglycemia 06/08/2021    Frequent PVCs 06/08/2021    Gastroesophageal reflux disease 09/06/2022    Bronchitis 12/29/2022    Need for vaccination against Streptococcus pneumoniae 01/10/2024    Personal history of gastric banding 01/10/2024    Class 3 severe obesity with serious comorbidity and body mass index (BMI) of 40.0 to 44.9 in adult 01/10/2024    Screening for malignant neoplasm of colon 01/10/2024    Annual physical exam 01/11/2024    Chronic pain of left knee 01/11/2024    Vitamin D deficiency 09/11/2024    Chronic fatigue 09/11/2024    Snoring 09/11/2024    STEVE (obstructive sleep apnea) 01/24/2025    Atrial fibrillation with RVR 04/28/2025    Acute systolic heart failure 04/30/2025    Localized swelling of both lower legs 05/07/2025    Shortness of breath 05/19/2025     Resolved Ambulatory Problems     Diagnosis Date Noted    No Resolved Ambulatory Problems     Past Medical History:   Diagnosis Date    Abnormal ECG 2010    Acid reflux     Acute medial meniscus tear     Arrhythmia 2010    Atrial fibrillation 2025     Community acquired pneumonia     Hyperlipidemia 2021    Hypertension 2025    Mild mitral regurgitation     Pulmonic regurgitation     Tricuspid regurgitation          PAST SURGICAL HISTORY  Past Surgical History:   Procedure Laterality Date    CARDIAC CATHETERIZATION N/A 5/2/2025    Procedure: Left Heart Cath;  Surgeon: Ray Castellanos MD;  Location:  [facility];  Service: Cardiovascular;  Laterality: N/A;    LAPAROSCOPIC GASTRIC BANDING      ROTATOR CUFF REPAIR           FAMILY HISTORY  Family History   Problem Relation Age of Onset    Heart disease Mother 50    Arrhythmia Mother     Diabetes Father     Sudden death Maternal Uncle 52    Stroke Maternal Grandmother 50    Diabetes Paternal Grandmother     Diabetes Paternal Grandfather     Hypertension Other     Heart disease Other         ischemic    Diabetes Other          SOCIAL HISTORY  Social History     Socioeconomic History    Marital status:    Tobacco Use    Smoking status: Light Smoker     Types: Cigars     Passive exposure: Current    Smokeless tobacco: Never    Tobacco comments:     1 cigar monthly   Vaping Use    Vaping status: Never Used   Substance and Sexual Activity    Alcohol use: Yes     Comment: caffeine use    Drug use: Never    Sexual activity: Defer         ALLERGIES  Lisinopril-hydrochlorothiazide        REVIEW OF SYSTEMS    All systems reviewed and marked as negative except as listed in HPI     PHYSICAL EXAM    I have reviewed the triage vital signs and nursing notes.    ED Triage Vitals   Temp Heart Rate Resp BP SpO2   06/12/25 1433 06/12/25 1425 06/12/25 1430 06/12/25 1433 06/12/25 1425   98.2 °F (36.8 °C) (!) 49 18 122/58 96 %      Temp src Heart Rate Source Patient Position BP Location FiO2 (%)   -- -- -- -- --              Physical Exam  Constitutional:       General: He is not in acute distress.     Appearance: Normal appearance. He is not ill-appearing.   HENT:      Head: Normocephalic and atraumatic.       Nose: Nose normal.   Eyes:      Extraocular Movements: Extraocular movements intact.      Pupils: Pupils are equal, round, and reactive to light.   Cardiovascular:      Rate and Rhythm: Tachycardia present. Rhythm irregular.      Pulses: Normal pulses.      Heart sounds: Normal heart sounds.   Pulmonary:      Effort: Pulmonary effort is normal. No respiratory distress.      Breath sounds: Normal breath sounds.   Abdominal:      General: Abdomen is flat.      Palpations: Abdomen is soft.   Skin:     General: Skin is warm and dry.      Coloration: Skin is not jaundiced.   Neurological:      Mental Status: He is alert and oriented to person, place, and time.   Psychiatric:         Mood and Affect: Mood normal.         Behavior: Behavior normal.         Thought Content: Thought content normal.         Vital signs and nursing notes reviewed.      LAB RESULTS  Recent Results (from the past 24 hours)   ECG 12 Lead ED Triage Standing Order; Chest Pain    Collection Time: 06/12/25  2:29 PM   Result Value Ref Range    QT Interval 379 ms    QTC Interval 475 ms   Comprehensive Metabolic Panel    Collection Time: 06/12/25  2:32 PM    Specimen: Arm, Right; Blood   Result Value Ref Range    Glucose 123 (H) 65 - 99 mg/dL    BUN 13.0 6.0 - 20.0 mg/dL    Creatinine 0.94 0.76 - 1.27 mg/dL    Sodium 139 136 - 145 mmol/L    Potassium 3.6 3.5 - 5.2 mmol/L    Chloride 103 98 - 107 mmol/L    CO2 24.0 22.0 - 29.0 mmol/L    Calcium 9.4 8.6 - 10.5 mg/dL    Total Protein 7.5 6.0 - 8.5 g/dL    Albumin 4.1 3.5 - 5.2 g/dL    ALT (SGPT) 22 1 - 41 U/L    AST (SGOT) 20 1 - 40 U/L    Alkaline Phosphatase 73 39 - 117 U/L    Total Bilirubin 0.8 0.0 - 1.2 mg/dL    Globulin 3.4 gm/dL    A/G Ratio 1.2 g/dL    BUN/Creatinine Ratio 13.8 7.0 - 25.0    Anion Gap 12.0 5.0 - 15.0 mmol/L    eGFR 95.1 >60.0 mL/min/1.73   High Sensitivity Troponin T    Collection Time: 06/12/25  2:32 PM    Specimen: Arm, Right; Blood   Result Value Ref Range    HS Troponin T 20  <22 ng/L   Green Top (Gel)    Collection Time: 06/12/25  2:32 PM   Result Value Ref Range    Extra Tube Hold for add-ons.    Lavender Top    Collection Time: 06/12/25  2:32 PM   Result Value Ref Range    Extra Tube hold for add-on    Gold Top - SST    Collection Time: 06/12/25  2:32 PM   Result Value Ref Range    Extra Tube Hold for add-ons.    Light Blue Top    Collection Time: 06/12/25  2:32 PM   Result Value Ref Range    Extra Tube Hold for add-ons.    CBC Auto Differential    Collection Time: 06/12/25  2:32 PM    Specimen: Arm, Right; Blood   Result Value Ref Range    WBC 9.88 3.40 - 10.80 10*3/mm3    RBC 6.00 (H) 4.14 - 5.80 10*6/mm3    Hemoglobin 16.5 13.0 - 17.7 g/dL    Hematocrit 49.8 37.5 - 51.0 %    MCV 83.0 79.0 - 97.0 fL    MCH 27.5 26.6 - 33.0 pg    MCHC 33.1 31.5 - 35.7 g/dL    RDW 13.4 12.3 - 15.4 %    RDW-SD 40.6 37.0 - 54.0 fl    MPV 9.2 6.0 - 12.0 fL    Platelets 257 140 - 450 10*3/mm3    Neutrophil % 67.2 42.7 - 76.0 %    Lymphocyte % 25.3 19.6 - 45.3 %    Monocyte % 5.7 5.0 - 12.0 %    Eosinophil % 1.0 0.3 - 6.2 %    Basophil % 0.2 0.0 - 1.5 %    Immature Grans % 0.6 (H) 0.0 - 0.5 %    Neutrophils, Absolute 6.64 1.70 - 7.00 10*3/mm3    Lymphocytes, Absolute 2.50 0.70 - 3.10 10*3/mm3    Monocytes, Absolute 0.56 0.10 - 0.90 10*3/mm3    Eosinophils, Absolute 0.10 0.00 - 0.40 10*3/mm3    Basophils, Absolute 0.02 0.00 - 0.20 10*3/mm3    Immature Grans, Absolute 0.06 (H) 0.00 - 0.05 10*3/mm3    nRBC 0.0 0.0 - 0.2 /100 WBC   Magnesium    Collection Time: 06/12/25  2:32 PM    Specimen: Arm, Right; Blood   Result Value Ref Range    Magnesium 2.3 1.6 - 2.6 mg/dL   High Sensitivity Troponin T 1Hr    Collection Time: 06/12/25  4:18 PM    Specimen: Arm, Left; Blood   Result Value Ref Range    HS Troponin T 19 <22 ng/L    Troponin T Numeric Delta -1 Abnormal if >/=3 ng/L       Ordered the above labs and independently reviewed the results.        RADIOLOGY  XR Chest 1 View  Result Date: 6/12/2025  XR CHEST 1  VW-  HISTORY: Male who is 54 years-old, chest pain  TECHNIQUE: Frontal views of the chest  COMPARISON: 5/18/2025  FINDINGS: Heart size is normal. Generous cardiac fat pad is apparent. Pulmonary vasculature is unremarkable. No focal pulmonary consolidation, pleural effusion, or pneumothorax. No acute osseous process.      No focal pulmonary consolidation. Follow-up as clinical indications persist.  This report was finalized on 6/12/2025 3:09 PM by Dr. Valdo Wells M.D on Workstation: [id number]        I ordered the above noted radiological studies. Independently reviewed by me and discussed with radiologist.  See dictation above for official radiology interpretation.        MEDICATIONS GIVEN IN ER    Medications   sodium chloride 0.9 % flush 10 mL (has no administration in time range)   aspirin tablet 325 mg (325 mg Oral Not Given 6/12/25 1630)   nitroglycerin (NITROSTAT) SL tablet 0.4 mg (has no administration in time range)   furosemide (LASIX) tablet 40 mg (has no administration in time range)   furosemide (LASIX) tablet 20 mg (20 mg Oral Not Given 6/12/25 1809)   losartan (COZAAR) tablet 50 mg (50 mg Oral Not Given 6/12/25 1809)   pantoprazole (PROTONIX) EC tablet 40 mg (has no administration in time range)   potassium chloride (KLOR-CON M10) CR tablet 10 mEq (has no administration in time range)   rivaroxaban (XARELTO) tablet 20 mg (20 mg Oral Given 6/12/25 1831)   albuterol (PROVENTIL) nebulizer solution 0.083% 2.5 mg/3mL (has no administration in time range)   potassium chloride (K-DUR,KLOR-CON) ER tablet 10 mEq (10 mEq Oral Given 6/12/25 1633)         PROGRESS, DATA ANALYSIS, CONSULTS, AND MEDICAL DECISION MAKING    All labs have been independently interpreted by me.  All radiology studies have been interpreted by me.  Discussion below represents my analysis of pertinent findings related to patient's condition, differential diagnosis, treatment plan and final disposition.    Stay throughout the emergency  department today patient found to have multiple runs of frequent PVCs that could be nonsustained ventricular tachycardia.  No significant electrolyte derangement although potassium is borderline low at 3.6.  No anemia or leukocytosis.  Patient is overall feeling okay, denies any presyncopal symptoms at this time outside of feeling warm but denies nausea or vomiting or lightheadedness.  Dr. Conti consulted, patient is to be admitted for continuous cardiac monitoring and possible ablation versus other therapeutic treatment of arrhythmias    - Chronic or social conditions impacting care: Atrial fibrillation with RVR      DIFFERENTIAL DIAGNOSIS INCLUDE BUT NOT LIMITED TO: Differential diagnosis includes but is not limited to:  -acute coronary syndrome  -pulmonary embolism  -thoracic aortic dissection  -pneumonia  -pneumothorax  -musculoskeletal pain  -GERD  -esophageal spasm  -anxiety  -myocarditis/pericarditis  -esophageal rupture  -pancreatitis.           Orders placed during this visit:  Orders Placed This Encounter   Procedures    XR Chest 1 View    [place] Draw    Comprehensive Metabolic Panel    High Sensitivity Troponin T    CBC Auto Differential    High Sensitivity Troponin T 1Hr    Magnesium    Basic Metabolic Panel    Magnesium    Diet: Cardiac; Healthy Heart (2-3 Na+); Fluid Consistency: Thin (IDDSI 0)    Undress & Gown    Continuous Pulse Oximetry    Maintain IV Access    Telemetry - Place Orders & Notify Provider of Results When Patient Experiences Acute Chest Pain, Dysrhythmia or Respiratory Distress    May Be Off Telemetry for Tests    Cardiology (on-call MD unless specified)    Cardiac Electrophysiologist Inpatient Consult    Oxygen Therapy- Nasal Cannula; Titrate 1-6 LPM Per SpO2; 90 - 95%    ECG 12 Lead ED Triage Standing Order; Chest Pain    ECG 12 Lead ED Triage Standing Order; Chest Pain    Telemetry Scan    Telemetry Scan    Insert Peripheral IV    Inpatient Admission    CBC & Differential    Green  Top (Gel)    Lavender Top    Gold Top - SST    Light Blue Top         ED Course as of 06/12/25 1842   Thu Jun 12, 2025   1610 Consulted with Dr. Conti,  with Cardiology    We discussed the patient's history and physical exam, along with pertinent lab and imaging findings.  Patient be admitted for further evaluation and treatment.  Dr Conti to call RIKKI Muñoz to see if patient has been taken off atenolol, may give 20 of potassium depending on conversation.  Patient will be admitted to Dr. Conti    [CV]   1611 HS Troponin T: 20 [CV]   1611 Glucose(!): 123 [CV]   1611 BUN: 13.0 [CV]   1611 Creatinine: 0.94 [CV]   1611 Sodium: 139 [CV]   1611 Potassium: 3.6 [CV]   1612 XR Chest 1 View  I reviewed the patient's chest x-ray in PACS  No evidence of a focal infiltrate [CV]      ED Course User Index  [CV] Jason Manrique PA-C       AS OF 18:46 EDT VITALS:    BP - 111/91  HR - 81  TEMP - 98 °F (36.7 °C) (Oral)  02 SATS - 98%      No follow-up provider specified.       Medication List      No changes were made to your prescriptions during this visit.         I rechecked the patient.  I discussed the patient's labs, radiology findings (including all incidental findings), diagnosis, and plan for admission. The patient understands and agrees with the plan.    DIAGNOSIS  Final diagnoses:   Frequent PVCs   Lightheadedness   Nonsustained ventricular tachycardia         DISPOSITION  Admit    Pt masked in first look. I wore a surgical mask throughout my encounters with the pt. I performed hand hygiene on entry into the pt room and upon exit.     Dictated utilizing Dragon dictation     Note Disclaimer: At [facility], we believe that sharing information builds trust and better relationships. You are receiving this note because you recently visited [facility]. It is possible you will see health information before a provider has talked with you about it. This kind of information can be easy to misunderstand. To help you fully  understand what it means for your health, we urge you to discuss this note with your provider.      Jason Manrique PA-C  06/12/25 1846      Electronically signed by Yarelis Sanabria MD at 06/12/25 1912       Angie Fuller, RN at 06/12/25 1424          Pt c/o palpitations, nausea, vomiting, diaphoretic. Pt denies pain    Electronically signed by Angie Fuller, RN at 06/12/25 1431       Vital Signs (last 2 days)       Date/Time Temp Temp src Pulse Resp BP Patient Position SpO2    06/13/25 1256 -- -- 76 -- 110/69 -- --    06/13/25 1215 98 (36.7) Oral 70 16 98/55 Lying --    06/13/25 1049 -- -- 84 -- 123/85 -- --    06/13/25 0903 98.2 (36.8) Oral 74 17 137/91 Lying --    06/13/25 0421 97.9 (36.6) Oral 75 16 105/60 Lying 95    06/12/25 1959 98.4 (36.9) Oral 99 16 120/62 Lying 93    06/12/25 1805 98 (36.7) Oral 81 16 111/91 Lying 98    06/12/25 1713 -- -- 96 -- 123/99 -- 95    06/12/25 1708 -- -- 96 18 125/101 Lying 97    06/12/25 1707 -- -- 95 -- 125/101 -- 96    06/12/25 1704 -- -- 90 -- 117/83 -- 97    06/12/25 1600 -- -- 89 -- 109/63 -- 95    06/12/25 1527 -- -- 92 18 127/65 Lying 96    06/12/25 1433 98.2 (36.8) -- 53 -- 122/58 -- --    06/12/25 1430 -- -- -- 18 -- -- --    06/12/25 1425 -- -- 49 -- -- -- 96          Oxygen Therapy (last 2 days)       Date/Time SpO2 Device (Oxygen Therapy) Flow (L/min) (Oxygen Therapy) Oxygen Concentration (%) ETCO2 (mmHg)    06/13/25 1215 -- room air -- -- --    06/13/25 0900 -- room air -- -- --    06/13/25 0458 -- room air -- -- --    06/13/25 0421 95 room air -- -- --    06/13/25 0016 -- room air -- -- --    06/12/25 2025 -- room air -- -- --    06/12/25 1959 93 room air -- -- --    06/12/25 1805 98 room air -- -- --    06/12/25 1802 -- room air -- -- --    06/12/25 1713 95 -- -- -- --    06/12/25 1708 97 room air -- -- --    06/12/25 1707 96 -- -- -- --    06/12/25 1704 97 -- -- -- --    06/12/25 1600 95 -- -- -- --    06/12/25 1527 96 -- -- -- --     06/12/25 1425 96 room air -- -- --          Lines, Drains & Airways       Active LDAs       Name Placement date Placement time Site Days    Peripheral IV 05/18/25 2250 20 G Left Antecubital 05/18/25  2250  Antecubital  25    Peripheral IV 06/12/25 1638 20 G Anterior;Left;Proximal Forearm 06/12/25  1638  Forearm  less than 1              Inactive LDAs       Name Placement date Placement time Removal date Removal time Site Days    [REMOVED] Peripheral IV 06/11/25 0800 22 G Left Antecubital 06/11/25  0800  06/11/25  0851  Antecubital  less than 1                  Facility-Administered Medications as of 6/13/2025   Medication Dose Route Frequency Provider Last Rate Last Admin    albuterol (PROVENTIL) nebulizer solution 0.083% 2.5 mg/3mL  2.5 mg Nebulization Q4H PRN Thai Alvarez MD        amiodarone (PACERONE) tablet 200 mg  200 mg Oral BID With Meals Toni Nieto APRN   200 mg at 06/13/25 1049    aspirin tablet 325 mg  325 mg Oral Once Yarelis Sanabria MD        furosemide (LASIX) tablet 20 mg  20 mg Oral Daily With Lunch Thai Alvarez MD   20 mg at 06/13/25 1256    furosemide (LASIX) tablet 40 mg  40 mg Oral Daily Thai Alvarez MD   40 mg at 06/13/25 0902    losartan (COZAAR) tablet 50 mg  50 mg Oral Daily Thai Alvarez MD   50 mg at 06/13/25 0904    nitroglycerin (NITROSTAT) SL tablet 0.4 mg  0.4 mg Sublingual Q5 Min PRN Thai Alvarez MD        ondansetron (ZOFRAN) injection 4 mg  4 mg Intravenous Q6H PRN Mary Anne Yanez APRN   4 mg at 06/12/25 2103    pantoprazole (PROTONIX) EC tablet 40 mg  40 mg Oral BID Thai Alvarez MD   40 mg at 06/13/25 0902    [COMPLETED] potassium chloride (K-DUR,KLOR-CON) ER tablet 10 mEq  10 mEq Oral Once Malorie Conti MD   10 mEq at 06/12/25 1633    potassium chloride (KLOR-CON M10) CR tablet 10 mEq  10 mEq Oral BID Thai Alvarez MD   10 mEq at 06/13/25 0902    rivaroxaban (XARELTO) tablet 20 mg  20 mg Oral Daily With Dinner Thai Alvarez MD   20 mg at 06/12/25 1835     sodium chloride 0.9 % flush 10 mL  10 mL Intravenous PRN Yarelis Sanabria MD         Orders (all)        Start     Ordered    06/13/25 1100  amiodarone (PACERONE) tablet 200 mg  2 Times Daily With Meals         06/13/25 1013    06/13/25 0900  furosemide (LASIX) tablet 40 mg  Daily         06/12/25 1656    06/13/25 0900  potassium chloride (KLOR-CON M10) CR tablet 10 mEq  2 Times Daily         06/12/25 1656    06/13/25 0600  Basic Metabolic Panel  Morning Draw         06/12/25 1644    06/13/25 0600  Magnesium  Morning Draw         06/12/25 1644    06/13/25 0402  Telemetry Scan  Once         06/13/25 0402    06/12/25 2100  pantoprazole (PROTONIX) EC tablet 40 mg  2 Times Daily         06/12/25 1656    06/12/25 2031  ondansetron (ZOFRAN) injection 4 mg  Every 6 Hours PRN         06/12/25 2034    06/12/25 1800  rivaroxaban (XARELTO) tablet 20 mg  Daily With Dinner         06/12/25 1656    06/12/25 1712  Telemetry Scan  Once         06/12/25 1712    06/12/25 1712  furosemide (LASIX) tablet 20 mg  Daily With Lunch         06/12/25 1656    06/12/25 1712  losartan (COZAAR) tablet 50 mg  Daily         06/12/25 1656    06/12/25 1659  albuterol (PROVENTIL) nebulizer solution 0.083% 2.5 mg/3mL  Every 4 Hours PRN         06/12/25 1659    06/12/25 1656  albuterol sulfate HFA (PROVENTIL HFA;VENTOLIN HFA;PROAIR HFA) inhaler 2 puff  Every 4 Hours PRN,   Status:  Discontinued         06/12/25 1656    06/12/25 1639  Diet: Cardiac; Healthy Heart (2-3 Na+); Fluid Consistency: Thin (IDDSI 0)  Diet Effective Now         06/12/25 1644    06/12/25 1638  Continuous Cardiac Monitoring  Continuous        Comments: Follow Standing Orders As Outlined in Process Instructions (Open Order Report to View Full Instructions)    06/12/25 1644    06/12/25 1638  Maintain IV Access  Continuous         06/12/25 1644    06/12/25 1638  Telemetry - Place Orders & Notify Provider of Results When Patient Experiences Acute Chest Pain, Dysrhythmia or  Respiratory Distress  Continuous        Comments: Open Order Report to View Parameters Requiring Provider Notification    06/12/25 1644    06/12/25 1638  May Be Off Telemetry for Tests  Continuous         06/12/25 1644    06/12/25 1637  nitroglycerin (NITROSTAT) SL tablet 0.4 mg  Every 5 Minutes PRN         06/12/25 1644    06/12/25 1632  potassium chloride (K-DUR,KLOR-CON) ER tablet 10 mEq  Once         06/12/25 1616    06/12/25 1619  Inpatient Admission  Once         06/12/25 1619    06/12/25 1616  Cardiac Electrophysiologist Inpatient Consult  Once        Specialty:  Cardiac Electrophysiology  Provider:  Jim Cornelius MD    06/12/25 1616    06/12/25 1613  CBC & Differential  STAT,   Status:  Canceled         06/12/25 1613    06/12/25 1613  CBC Auto Differential  PROCEDURE ONCE,   Status:  Canceled         06/12/25 1613    06/12/25 1558  Telemetry Scan  Once         06/12/25 1558    06/12/25 1553  Magnesium  STAT         06/12/25 1552    06/12/25 1553  Cardiology (on-call MD unless specified)  Once        Specialty:  Cardiology  Provider:  Malorei Conti MD    06/12/25 1553    06/12/25 1532  High Sensitivity Troponin T 1Hr  PROCEDURE ONCE         06/12/25 1506    06/12/25 1441  aspirin tablet 325 mg  Once         06/12/25 1425    06/12/25 1426  NPO Diet NPO Type: Strict NPO  Diet Effective Now,   Status:  Canceled         06/12/25 1425    06/12/25 1426  Undress & Gown  Once         06/12/25 1425    06/12/25 1426  Cardiac Monitoring  Continuous,   Status:  Canceled        Comments: Follow Standing Orders As Outlined in Process Instructions (Open Order Report to View Full Instructions)    06/12/25 1425    06/12/25 1426  Continuous Pulse Oximetry  Continuous         06/12/25 1425    06/12/25 1426  ECG 12 Lead ED Triage Standing Order; Chest Pain  Once         06/12/25 1425    06/12/25 1426  XR Chest 1 View  1 Time Imaging         06/12/25 1425    06/12/25 1426  Insert Peripheral IV  Once         06/12/25 1425     25 1426  [place] Draw  Once         25 1425    25 1426  CBC & Differential  Once         25 1425    25 1426  Comprehensive Metabolic Panel  Once         25 1425    25 1426  High Sensitivity Troponin T  Once         25 1425    25 1426  Green Top (Gel)  PROCEDURE ONCE         25 1425    25 1426  Lavender Top  PROCEDURE ONCE         25 1425    25 1426  Gold Top - SST  PROCEDURE ONCE         25 1425    25 1426  Light Blue Top  PROCEDURE ONCE         25 1425    25 1426  CBC Auto Differential  PROCEDURE ONCE         25 1425    25 1425  sodium chloride 0.9 % flush 10 mL  As Needed         25 1425    Unscheduled  Oxygen Therapy- Nasal Cannula; Titrate 1-6 LPM Per SpO2; 90 - 95%  Continuous PRN       25 1425    Unscheduled  ECG 12 Lead ED Triage Standing Order; Chest Pain  As Needed      Comments: Persistent, Unrelieved or Recurrent Chest Pain    25 1425                         Consult Notes (all)        Toni Nieto APRN at 25 1111        Consult Orders    1. Cardiac Electrophysiologist Inpatient Consult [[id number]] ordered by Malorie Conti MD at 25 1616              Attestation signed by Jim Cornelius MD at 25 1430      I have reviewed this documentation and agree.                            Electrophysiology Hospital Consult            Patient Name: Jaime Lyle Jr.  Age/Sex: 54 y.o. male  : 1970  MRN: [id number]    Date of Admission: 2025  Date of Encounter Visit: 25  Encounter Provider: RIKKI Sanderson  Referring Provider: Malorie Conti MD  Place of Service: [facility] CARDIOLOGY  Patient Care Team:  Zehra Shen APRN as PCP - General (Nurse Practitioner)  Chandra Nicholson MD as Consulting Physician (Cardiology)      Subjective:   EP Consultation for: PVCs, palpitations, nonischemic  cardiomyopathy    Chief Complaint: Palpitations    History of Present Illness:  Jaime Lyle Jr. is a 54 y.o. male who follows with Dr. Conti.  He has a history of PVCs, persistent atrial fibrillation, and nonischemic cardiomyopathy.    He presented to the ED in early May and was noted to be in A-fib with RVR which was new for him.  Echo showed a EF of 41 to 45% which was felt to be tachycardia induced cardiomyopathy.  Rate control was attempted.    He underwent normal heart cath at that time.    He developed worsening symptoms and presented back to the ED a few weeks later and was still in A-fib.  We elected to cardiovert him at that time.    He was seen in the office for an EKG in early June and was noted to be in sinus rhythm.  He was bradycardic so his atenolol was stopped.    He had an echo earlier this week which showed a decline in his EF to 36% Dr. Conti called and increased his losartan to try to maximize his medical therapy.      He presented to the ED last night with worsening palpitations for the last couple of days.  He was noted to have frequent PVCs and occasional NSVT.    EP has been asked to evaluate.      Dr. Cornelius I saw him.    He says the past few days has been having worsening palpitations.  He said it was similar to his PVCs in the past so that is what he suspected.    He was also worried about recurrent A-fib.    This morning.  He is not having as frequent PVCs as he was when he came in.    Overall he says that he feels well and his symptoms have subsided.    Past Medical History:  Past Medical History:   Diagnosis Date    Abnormal ECG 2010    Caffeine induced pvcs    Acid reflux     Acute medial meniscus tear     left knee    Arrhythmia 2010    Atrial fibrillation 2025    Community acquired pneumonia     Hyperlipidemia 2021    Hypertension 2025    Mild mitral regurgitation     Obesity (BMI 35.0-39.9 without comorbidity)     STEVE (obstructive sleep apnea)     Pulmonic regurgitation     trace     PVC's (premature ventricular contractions)     Tricuspid regurgitation     trace       Past Surgical History:   Procedure Laterality Date    CARDIAC CATHETERIZATION N/A 5/2/2025    Procedure: Left Heart Cath;  Surgeon: Ray Castellanos MD;  Location: [facility] CATH INVASIVE LOCATION;  Service: Cardiovascular;  Laterality: N/A;    LAPAROSCOPIC GASTRIC BANDING      ROTATOR CUFF REPAIR         Home Medications:   Medications Prior to Admission   Medication Sig Dispense Refill Last Dose/Taking    furosemide (LASIX) 40 MG tablet Take 40mg po in am and 20mg (1/2 tab) at noon 180 tablet 1 6/12/2025    hydrOXYzine (ATARAX) 10 MG tablet Take 1-2 tablets by mouth Every 8 (Eight) Hours As Needed for Anxiety. 30 tablet 0 Taking As Needed    losartan (Cozaar) 50 MG tablet Take 1 tablet by mouth Daily.   6/12/2025    pantoprazole (PROTONIX) 40 MG EC tablet TAKE 1 TABLET BY MOUTH 2 TIMES A  tablet 0 6/12/2025 Morning    potassium chloride 10 MEQ CR tablet Take 1 tablet by mouth 2 (Two) Times a Day. 180 tablet 1 6/12/2025    rivaroxaban (XARELTO) 20 MG tablet Take 1 tablet by mouth Daily With Dinner. Indications: Atrial Fibrillation 90 tablet 1 6/11/2025    albuterol sulfate  (90 Base) MCG/ACT inhaler Inhale 2 puffs Every 4 (Four) Hours As Needed for Wheezing. 8 g 11     atenolol (TENORMIN) 25 MG tablet Take 1 tablet by mouth Every 12 (Twelve) Hours for 30 days. 60 tablet 0        Allergies:  Allergies   Allergen Reactions    Lisinopril-Hydrochlorothiazide Cough       Past Social History:  Social History     Socioeconomic History    Marital status:    Tobacco Use    Smoking status: Light Smoker     Types: Cigars     Passive exposure: Current    Smokeless tobacco: Never    Tobacco comments:     1 cigar monthly   Vaping Use    Vaping status: Never Used   Substance and Sexual Activity    Alcohol use: Yes     Comment: caffeine use    Drug use: Never    Sexual activity: Defer       Past Family History:  Family  History   Problem Relation Age of Onset    Heart disease Mother 50    Arrhythmia Mother     Diabetes Father     Sudden death Maternal Uncle 52    Stroke Maternal Grandmother 50    Diabetes Paternal Grandmother     Diabetes Paternal Grandfather     Hypertension Other     Heart disease Other         ischemic    Diabetes Other        Review of Systems: All systems reviewed. Pertinent positives identified in HPI. All other systems are negative.     14 point ROS was performed and is negative except as outlined in HPI.     Objective:     Objective:  Vital Signs (last 24 hours)         06/12 0700  06/13 0659 06/13 0700  06/13 1111   Most Recent      Temp (°F) 97.9 -  98.4      98.2     98.2 (36.8) 06/13 0903    Heart Rate 49 -  99    74 -  84     84 06/13 1049    Resp 16 -  18      17     17 06/13 0903    /60 -  127/65    123/85 -  137/91     123/85 06/13 1049    SpO2 (%) 93 -  98       95 06/13 0421          Temp:  [97.9 °F (36.6 °C)-98.4 °F (36.9 °C)] 98.2 °F (36.8 °C)  Heart Rate:  [49-99] 84  Resp:  [16-18] 17  BP: (105-137)/() 123/85  Body mass index is 42.63 kg/m².        Physical Exam:     General Appearance: No acute distress, well developed and well nourished.   Respiratory: No signs of respiratory distress. Respiration rhythm and depth normal.   Cardiovascular:  Heart Rate and Rhythm: Normal, Heart Sounds: Normal S1 and S2. No S3 or S4 noted  Skin: Warm and dry.   Psychiatric: Patient alert and oriented to person, place, and time. Speech and behavior appropriate. Normal mood and affect.    Labs:   Lab Review:     Results from last 7 days   Lab Units 06/13/25  0240 06/12/25  1432 06/10/25  0932   SODIUM mmol/L 140 139 140   POTASSIUM mmol/L 3.6 3.6 4.0   CHLORIDE mmol/L 103 103 102   CO2 mmol/L 27.1 24.0 25.9   BUN mg/dL 16.0 13.0 14.0   CREATININE mg/dL 0.98 0.94 0.99   GLUCOSE mg/dL 120* 123* 99   CALCIUM mg/dL 9.4 9.4 9.7   AST (SGOT) U/L  --  20 21   ALT (SGPT) U/L  --  22 30     Results from last  7 days   Lab Units 06/12/25  1618 06/12/25  1432   HSTROP T ng/L 19 20     Results from last 7 days   Lab Units 06/12/25  1432   WBC 10*3/mm3 9.88   HEMOGLOBIN g/dL 16.5   HEMATOCRIT % 49.8   PLATELETS 10*3/mm3 257             Results from last 7 days   Lab Units 06/13/25  0240   MAGNESIUM mg/dL 2.5         Results from last 7 days   Lab Units 06/10/25  0932   PROBNP pg/mL 357.0             EKG:         I personally viewed and interpreted the patient's EKG/Telemetry tracings.    Assessment:       Nonsustained ventricular tachycardia        Plan:   He is having more frequent PVCs and episodes of bigeminy that brought him to the emergency room.  We recommended amiodarone 200 mg twice daily to try to suppress the PVCs.    We discussed and recommended trying to maximize his GDMT for his nonischemic cardiomyopathy as his EF is still down despite being in sinus rhythm.    We will try to suppress the PVCs and increase his medical therapy.  Will need to at least give medications 3 months and repeat echo.      We can have him follow-up in EP clinic and continue to assess his cardiomyopathy being forward.    Recommend monitoring him at least another 24 hours on amiodarone to evaluate for suppression of PVCs.    If he has frequent NSVT then could consider amiodarone bolus.     EP will continue to follow along while he is here.         Thank you for allowing me to participate in the care of Jaime Lyle . Feel free to contact me directly with any further questions or concerns.    RIKKI Sanderson  [facility]  06/13/25  11:11 EDT     Electronically signed by Jim Cornelius MD at 06/13/25 1437

## 2025-06-13 NOTE — PROGRESS NOTES
Discharge Planning Assessment  [facility]     Patient Name: Jaime Lyle Jr.  MRN: [id number]  Today's Date: 6/13/2025    Admit Date: 6/12/2025    Plan: Return home with spouse   Discharge Needs Assessment       Row Name 06/13/25 1313       Living Environment    People in Home spouse    Name(s) of People in Home Aliza Earl    Current Living Arrangements home    Potentially Unsafe Housing Conditions none    Primary Care Provided by self    Provides Primary Care For no one    Family Caregiver if Needed spouse    Family Caregiver Names Aliza Earl [phone]    Quality of Family Relationships helpful    Able to Return to Prior Arrangements yes       Resource/Environmental Concerns    Resource/Environmental Concerns none    Transportation Concerns none       Transition Planning    Patient/Family Anticipates Transition to home with family    Patient/Family Anticipated Services at Transition none    Transportation Anticipated family or friend will provide       Discharge Needs Assessment    Readmission Within the Last 30 Days no previous admission in last 30 days    Equipment Currently Used at Home cpap    Concerns to be Addressed denies needs/concerns at this time    Anticipated Changes Related to Illness none    Equipment Needed After Discharge none                   Discharge Plan       Row Name 06/13/25 6152       Plan    Plan Return home with spouse    Patient/Family in Agreement with Plan yes    Plan Comments Spoke with patient and wife Mady [phone] at bedside.  They live in a SS house with [facility].  He is IADL, has a C-pap from [facility] [place], has never used HH or been to SNF.  PCP is Zehra BRANDT and pharmacy is [address]  Patient drives, wife drives and will assist if needed.  He plans to return home at [place].  CCP will follow.  Josi WANG                    Expected Discharge Date and Time       Expected Discharge Date Expected Discharge Time    Jun 16, 2025            Demographic Summary        Row Name 06/13/25 1312       General Information    Admission Type inpatient    Arrived From home    Referral Source admission list    Reason for Consult discharge planning    Preferred Language English                   Functional Status       Row Name 06/13/25 1312       Functional Status    Usual Activity Tolerance good    Current Activity Tolerance moderate       Functional Status, IADL    Medications independent    Meal Preparation independent;assistive person  Wife    Housekeeping independent    Laundry independent    Shopping independent;assistive person       Mental Status    General Appearance WDL WDL       Mental Status Summary    Recent Changes in Mental Status/Cognitive Functioning no changes                               Becky S. [place], RN

## 2025-06-14 LAB — POTASSIUM SERPL-SCNC: 4.2 MMOL/L (ref 3.5–5.2)

## 2025-06-14 PROCEDURE — 99232 SBSQ HOSP IP/OBS MODERATE 35: CPT | Performed by: NURSE PRACTITIONER

## 2025-06-14 PROCEDURE — 84132 ASSAY OF SERUM POTASSIUM: CPT | Performed by: INTERNAL MEDICINE

## 2025-06-14 RX ORDER — POTASSIUM CHLORIDE 1500 MG/1
40 TABLET, EXTENDED RELEASE ORAL EVERY 4 HOURS
Status: COMPLETED | OUTPATIENT
Start: 2025-06-14 | End: 2025-06-14

## 2025-06-14 RX ADMIN — PANTOPRAZOLE SODIUM 40 MG: 40 TABLET, DELAYED RELEASE ORAL at 21:19

## 2025-06-14 RX ADMIN — POTASSIUM CHLORIDE 10 MEQ: 750 TABLET, EXTENDED RELEASE ORAL at 21:20

## 2025-06-14 RX ADMIN — SACUBITRIL AND VALSARTAN 1 TABLET: 24; 26 TABLET, FILM COATED ORAL at 08:03

## 2025-06-14 RX ADMIN — POTASSIUM CHLORIDE 40 MEQ: 1500 TABLET, EXTENDED RELEASE ORAL at 15:45

## 2025-06-14 RX ADMIN — AMIODARONE HYDROCHLORIDE 200 MG: 200 TABLET ORAL at 18:31

## 2025-06-14 RX ADMIN — AMIODARONE HYDROCHLORIDE 200 MG: 200 TABLET ORAL at 08:03

## 2025-06-14 RX ADMIN — FUROSEMIDE 20 MG: 40 TABLET ORAL at 11:55

## 2025-06-14 RX ADMIN — ZOLPIDEM TARTRATE 5 MG: 5 TABLET ORAL at 21:21

## 2025-06-14 RX ADMIN — SACUBITRIL AND VALSARTAN 1 TABLET: 24; 26 TABLET, FILM COATED ORAL at 21:20

## 2025-06-14 RX ADMIN — POTASSIUM CHLORIDE 40 MEQ: 1500 TABLET, EXTENDED RELEASE ORAL at 11:55

## 2025-06-14 RX ADMIN — METOPROLOL SUCCINATE 25 MG: 25 TABLET, EXTENDED RELEASE ORAL at 08:02

## 2025-06-14 RX ADMIN — POTASSIUM CHLORIDE 10 MEQ: 750 TABLET, EXTENDED RELEASE ORAL at 08:03

## 2025-06-14 RX ADMIN — PANTOPRAZOLE SODIUM 40 MG: 40 TABLET, DELAYED RELEASE ORAL at 08:03

## 2025-06-14 RX ADMIN — RIVAROXABAN 20 MG: 20 TABLET, FILM COATED ORAL at 18:31

## 2025-06-14 RX ADMIN — FUROSEMIDE 40 MG: 40 TABLET ORAL at 08:03

## 2025-06-14 NOTE — PROGRESS NOTES
"[facility]    Patient Name: Jaime Lyle Jr.  :1970  54 y.o.  LOS: 2  Encounter Provider: RIKKI Mesa      Patient Care Team:  Zehra Shen APRN as PCP - General (Nurse Practitioner)  Chandra Nicholson MD as Consulting Physician (Cardiology)    Chief Complaint:  palpitations, PVC    Interval History: Doing well. Sitting up in the chair. Palpitations have improved.       Objective   Vital Signs  Temp:  [97.7 °F (36.5 °C)-98.2 °F (36.8 °C)] 97.9 °F (36.6 °C)  Heart Rate:  [61-84] 68  Resp:  [16-19] 19  BP: ()/(51-91) 117/78    Intake/Output Summary (Last 24 hours) at 2025 0854  Last data filed at 2025 0844  Gross per 24 hour   Intake 120 ml   Output --   Net 120 ml     Flowsheet Rows      Flowsheet Row First Filed Value   Admission Height 182.9 cm (72\") Documented at 2025 1708   Admission Weight 142 kg (314 lb) Documented at 2025 1708              Vitals reviewed.   Constitutional:       General: Not in acute distress.     Appearance: Well-developed. Not diaphoretic.   HENT:      Head: Normocephalic.   Pulmonary:      Effort: Pulmonary effort is normal. No respiratory distress.      Breath sounds: Normal breath sounds. No wheezing. No rhonchi. No rales.   Cardiovascular:      Normal rate. Regular rhythm.   Pulses:     Radial: 2+ bilaterally.  Edema:     Peripheral edema absent.   Skin:     General: Skin is warm and dry. There is no cyanosis.      Findings: No rash.   Neurological:      Mental Status: Alert and oriented to person, place, and time.   Psychiatric:         Behavior: Behavior normal.         Thought Content: Thought content normal.         Judgment: Judgment normal.           Pertinent Test Results:  Results from last 7 days   Lab Units 25  0240 25  1432 06/10/25  0932   SODIUM mmol/L 140 139 140   POTASSIUM mmol/L 3.6 3.6 4.0   CHLORIDE mmol/L 103 103 102   CO2 mmol/L 27.1 24.0 25.9   BUN mg/dL 16.0 13.0 14.0 " "  CREATININE mg/dL 0.98 0.94 0.99   GLUCOSE mg/dL 120* 123* 99   CALCIUM mg/dL 9.4 9.4 9.7   AST (SGOT) U/L  --  20 21   ALT (SGPT) U/L  --  22 30     Results from last 7 days   Lab Units 06/12/25  1618 06/12/25  1432   HSTROP T ng/L 19 20     Results from last 7 days   Lab Units 06/12/25  1432 06/10/25  0932   WBC 10*3/mm3 9.88 8.95   HEMOGLOBIN g/dL 16.5 16.3   HEMATOCRIT % 49.8 51.8*   PLATELETS 10*3/mm3 257 258         Results from last 7 days   Lab Units 06/13/25  0240 06/12/25  1432 06/10/25  0932   MAGNESIUM mg/dL 2.5 2.3 2.5           Invalid input(s): \"LDLCALC\"  Results from last 7 days   Lab Units 06/10/25  0932   PROBNP pg/mL 357.0               Medication Review:   amiodarone, 200 mg, Oral, BID With Meals  furosemide, 20 mg, Oral, Daily With Lunch  furosemide, 40 mg, Oral, Daily  metoprolol succinate XL, 25 mg, Oral, Q24H  pantoprazole, 40 mg, Oral, BID  potassium chloride, 10 mEq, Oral, BID  rivaroxaban, 20 mg, Oral, Daily With Dinner  sacubitril-valsartan, 1 tablet, Oral, Q12H              Assessment & Plan     Active Hospital Problems    Diagnosis  POA    **Nonsustained ventricular tachycardia [I47.29]  Yes    Class 3 severe obesity with serious comorbidity and body mass index (BMI) of 40.0 to 44.9 in adult [E66.813, Z68.41, E66.01]  Not Applicable    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        Frequent PVC: was seen by EP service and started on amiodarone. PVC frequency has improved.   NSVT  NICM, EF 35-40%. Now on GDMT with Toprol, Entresto and furosemide. Will get first dose of Entresto this AM. Will check BMP in AM. If stable and BP tolerates, will add SGLT2 tomorrow.  Persistent atrial fibrillation s/p cardioversion    If all stable and BP tolerates GDMT, consider D/C tomorrow. He already has scheduled follow up with Dr. Conti on Tuesday 6/17.         RIKKI Mesa  [facility]   [facility] "   [address] [address]  Office: [phone]    06/14/25  08:54 EDT

## 2025-06-15 ENCOUNTER — READMISSION MANAGEMENT (OUTPATIENT)
Dept: CALL CENTER | Facility: HOSPITAL | Age: 55
End: 2025-06-15
Payer: COMMERCIAL

## 2025-06-15 VITALS
HEIGHT: 72 IN | DIASTOLIC BLOOD PRESSURE: 64 MMHG | OXYGEN SATURATION: 95 % | RESPIRATION RATE: 18 BRPM | HEART RATE: 67 BPM | BODY MASS INDEX: 42.16 KG/M2 | TEMPERATURE: 97.6 F | SYSTOLIC BLOOD PRESSURE: 134 MMHG | WEIGHT: 311.3 LBS

## 2025-06-15 LAB
ANION GAP SERPL CALCULATED.3IONS-SCNC: 11 MMOL/L (ref 5–15)
BUN SERPL-MCNC: 14 MG/DL (ref 6–20)
BUN/CREAT SERPL: 13.9 (ref 7–25)
CALCIUM SPEC-SCNC: 8.5 MG/DL (ref 8.6–10.5)
CHLORIDE SERPL-SCNC: 105 MMOL/L (ref 98–107)
CO2 SERPL-SCNC: 22 MMOL/L (ref 22–29)
CREAT SERPL-MCNC: 1.01 MG/DL (ref 0.76–1.27)
EGFRCR SERPLBLD CKD-EPI 2021: 87.3 ML/MIN/1.73
GLUCOSE SERPL-MCNC: 111 MG/DL (ref 65–99)
POTASSIUM SERPL-SCNC: 4.3 MMOL/L (ref 3.5–5.2)
SODIUM SERPL-SCNC: 138 MMOL/L (ref 136–145)

## 2025-06-15 PROCEDURE — 99238 HOSP IP/OBS DSCHRG MGMT 30/<: CPT | Performed by: NURSE PRACTITIONER

## 2025-06-15 PROCEDURE — 80048 BASIC METABOLIC PNL TOTAL CA: CPT | Performed by: NURSE PRACTITIONER

## 2025-06-15 RX ORDER — FUROSEMIDE 40 MG/1
40 TABLET ORAL DAILY
Qty: 90 TABLET | Refills: 3 | Status: SHIPPED | OUTPATIENT
Start: 2025-06-16

## 2025-06-15 RX ORDER — METOPROLOL SUCCINATE 25 MG/1
25 TABLET, EXTENDED RELEASE ORAL
Qty: 90 TABLET | Refills: 3 | Status: SHIPPED | OUTPATIENT
Start: 2025-06-16

## 2025-06-15 RX ORDER — AMIODARONE HYDROCHLORIDE 200 MG/1
200 TABLET ORAL 2 TIMES DAILY WITH MEALS
Qty: 180 TABLET | Refills: 0 | Status: SHIPPED | OUTPATIENT
Start: 2025-06-15

## 2025-06-15 RX ORDER — FUROSEMIDE 20 MG/1
20 TABLET ORAL
Qty: 90 TABLET | Refills: 3 | Status: SHIPPED | OUTPATIENT
Start: 2025-06-16

## 2025-06-15 RX ADMIN — SACUBITRIL AND VALSARTAN 1 TABLET: 24; 26 TABLET, FILM COATED ORAL at 08:25

## 2025-06-15 RX ADMIN — FUROSEMIDE 40 MG: 40 TABLET ORAL at 08:25

## 2025-06-15 RX ADMIN — FUROSEMIDE 20 MG: 40 TABLET ORAL at 12:11

## 2025-06-15 RX ADMIN — AMIODARONE HYDROCHLORIDE 200 MG: 200 TABLET ORAL at 08:25

## 2025-06-15 RX ADMIN — PANTOPRAZOLE SODIUM 40 MG: 40 TABLET, DELAYED RELEASE ORAL at 08:25

## 2025-06-15 RX ADMIN — POTASSIUM CHLORIDE 10 MEQ: 750 TABLET, EXTENDED RELEASE ORAL at 08:25

## 2025-06-15 RX ADMIN — METOPROLOL SUCCINATE 25 MG: 25 TABLET, EXTENDED RELEASE ORAL at 08:25

## 2025-06-15 NOTE — OUTREACH NOTE
Prep Survey      Flowsheet Row Responses   [facility] patient discharged from? [facility]   Is LACE score < 7 ? No   Eligibility [facility]   Date of Admission 06/12/25   Date of Discharge 06/15/25   Discharge Disposition Home or Self Care   Discharge diagnosis palpitations   Does the patient have one of the following disease processes/diagnoses(primary or secondary)? Other   Does the patient have Home health ordered? No   Is there a DME ordered? No   Prep survey completed? Yes            PARUL HEDRICK - Registered Nurse

## 2025-06-15 NOTE — PLAN OF CARE
Goal Outcome Evaluation:  Plan of Care Reviewed With: patient           Outcome Evaluation: pt vss, alert orent X4, RA, familly at bedside,sinus Mathew on the monitor, ambulates aroung the units,possible dc to day.                             
Goal Outcome Evaluation:  Plan of Care Reviewed With: patient        Progress: improving  Outcome Evaluation: pt aox4, on RA, no c/o pain, C/o nausea treated per mar, interventions effective per patient. VSS. Continue plan of care.                             
Goal Outcome Evaluation:  Plan of Care Reviewed With: patient        Progress: no change  Outcome Evaluation: Ambulates frequently. PO Amiodarone, Toprol XL started today. SR on monitor and having PVC's, 3-4 beat V-tach and Bigem PVC's. PVC's are wide. Entresto to start tomorrow. Teaching information given for new medications. Continue POC.                             
Goal Outcome Evaluation:  Plan of Care Reviewed With: patient, spouse        Progress: improving  Outcome Evaluation: Pt is AoX4, on room air, needs met, spouse at bedside, tele monitored.                             
Goal Outcome Evaluation: palpatations intermittently symptomatic, K administered per protocol, repeat K 4.2.  Pt up ad luis a, walks in hallway w/ wife.                                              
PT discharged home, AVS reviewed and questions answered, IV removed    Problem: Adult Inpatient Plan of Care  Goal: Plan of Care Review  Outcome: Met  Goal: Patient-Specific Goal (Individualized)  Outcome: Met  Goal: Absence of Hospital-Acquired Illness or Injury  Outcome: Met  Intervention: Identify and Manage Fall Risk  Recent Flowsheet Documentation  Taken 6/15/2025 1300 by Audie Mcclelland RN  Safety Promotion/Fall Prevention: activity supervised  Intervention: Prevent Infection  Recent Flowsheet Documentation  Taken 6/15/2025 1300 by Audie Mcclelland RN  Infection Prevention:   equipment surfaces disinfected   personal protective equipment utilized  Goal: Optimal Comfort and Wellbeing  Outcome: Met  Goal: Readiness for Transition of Care  Outcome: Met     Problem: Comorbidity Management  Goal: Blood Pressure in Desired Range  Outcome: Met  Intervention: Maintain Blood Pressure Management  Recent Flowsheet Documentation  Taken 6/15/2025 1300 by Audie Mcclelland RN  Medication Review/Management: medications reviewed     Problem: Dysrhythmia  Goal: Normalized Cardiac Rhythm  Outcome: Met   Goal Outcome Evaluation:                                            
all other ROS negative except as per HPI

## 2025-06-15 NOTE — DISCHARGE SUMMARY
[facility]      Patient Name: Jaime Lyle Jr.  :1970  54 y.o.  LOS: 3  Encounter Provider: RIKKI Mesa      Date of Admission: 2025    Date of Discharge:  6/15/2025    Treatment Team:  Patient Care Team:  Zehra Shen APRN as PCP - General (Nurse Practitioner)  Chandra Nicholson MD as Consulting Physician (Cardiology)    Discharge Condition: Good  Discharge Diagnosis:    Nonsustained ventricular tachycardia    Frequent PVCs    Class 3 severe obesity with serious comorbidity and body mass index (BMI) of 40.0 to 44.9 in adult      History of Present Illness:  Jaime Lyle Jr. is a 54 y.o. male who was admitted on 2025 with obesity, HTN, STEVE, persistent AF, and long standing PVCs.     He was admitted two months ago with a URI and was noted to be in rapid AF. An echo revealed an EF of 40-45%. He was started on metoprolol, rivaroxaban, and losartan. HCTZ was stopped.     He was admitted a few weeks later with symptomatic tachycardia and SOA, Coronary angiography revealed luminal irregularities. His EF had declined to 35-40%. He was loaded with digoxin and ultimately underwent cardioversion.     Since then, his PVCs, which he has had since he was a teenager,  have been much more symptomatic since the spring. He came to the ED and was noted to have very frequent PVCs and runs of NSVT.    Hospital Course:   EP was consulted and started patient on amiodarone. His GDMT was also adjusted and he was started on Entresto, Toprol and Jardiance. MRA to be considered as outpatient based on BP response.      Objective:  Temp:  [97.4 °F (36.3 °C)-97.9 °F (36.6 °C)] 97.6 °F (36.4 °C)  Heart Rate:  [54-77] 67  Resp:  [18-19] 18  BP: (112-134)/(64-80) 134/64    Intake/Output Summary (Last 24 hours) at 6/15/2025 1336  Last data filed at 6/15/2025 1200  Gross per 24 hour   Intake 720 ml   Output 2225 ml   Net -1505 ml     Body mass index is 42.63 kg/m².      25  7934  "06/12/25 1805   Weight: (!) 142 kg (314 lb) (!) 141 kg (311 lb 4.8 oz)     Weight change:     Vitals reviewed.   Constitutional:       General: Not in acute distress.     Appearance: Well-developed and not in distress. Not diaphoretic.   HENT:      Head: Normocephalic.   Pulmonary:      Effort: Pulmonary effort is normal. No respiratory distress.      Breath sounds: Normal breath sounds. No wheezing. No rhonchi. No rales.   Cardiovascular:      Normal rate. Irregular rhythm.   Pulses:     Radial: 2+ bilaterally.  Edema:     Peripheral edema absent.   Skin:     General: Skin is warm and dry. There is no cyanosis.      Findings: No rash.   Neurological:      Mental Status: Alert and oriented to person, place, and time.   Psychiatric:         Behavior: Behavior normal. Behavior is cooperative.         Thought Content: Thought content normal.         Judgment: Judgment normal.         Procedures Performed:           Pertinent Test Results:  Results from last 7 days   Lab Units 06/15/25  0255 06/14/25  1826 06/13/25  0240 06/12/25  1432 06/10/25  0932   SODIUM mmol/L 138  --  140 139 140   POTASSIUM mmol/L 4.3 4.2 3.6 3.6 4.0   CHLORIDE mmol/L 105  --  103 103 102   CO2 mmol/L 22.0  --  27.1 24.0 25.9   BUN mg/dL 14.0  --  16.0 13.0 14.0   CREATININE mg/dL 1.01  --  0.98 0.94 0.99   GLUCOSE mg/dL 111*  --  120* 123* 99   CALCIUM mg/dL 8.5*  --  9.4 9.4 9.7   AST (SGOT) U/L  --   --   --  20 21   ALT (SGPT) U/L  --   --   --  22 30     Results from last 7 days   Lab Units 06/12/25  1618 06/12/25  1432   HSTROP T ng/L 19 20     Results from last 7 days   Lab Units 06/12/25  1432 06/10/25  0932   WBC 10*3/mm3 9.88 8.95   HEMOGLOBIN g/dL 16.5 16.3   HEMATOCRIT % 49.8 51.8*   PLATELETS 10*3/mm3 257 258         Results from last 7 days   Lab Units 06/13/25  0240 06/12/25  1432 06/10/25  0932   MAGNESIUM mg/dL 2.5 2.3 2.5           Invalid input(s): \"LDLCALC\"  Results from last 7 days   Lab Units 06/10/25  0932   PROBNP pg/mL " 357.0           Discharge Diet:    Dietary Orders (From admission, onward)       Start     Ordered    06/12/25 1639  Diet: Cardiac; Healthy Heart (2-3 Na+); Fluid Consistency: Thin (IDDSI 0)  Diet Effective Now        References:    Diet Order Definitions   Question Answer Comment   Diets: Cardiac    Cardiac Diet: Healthy Heart (2-3 Na+)    Fluid Consistency: Thin (IDDSI 0)        06/12/25 1644                    Activity at Discharge:   As tolerated    Discharge disposition: home     Discharge Medications:     Discharge Medications        New Medications        Instructions Start Date   amiodarone 200 MG tablet  Commonly known as: PACERONE   200 mg, Oral, 2 Times Daily With Meals      empagliflozin 10 MG tablet tablet  Commonly known as: JARDIANCE   10 mg, Oral, Daily      metoprolol succinate XL 25 MG 24 hr tablet  Commonly known as: TOPROL-XL   25 mg, Oral, Every 24 Hours Scheduled   Start Date: June 16, 2025     sacubitril-valsartan 24-26 MG tablet  Commonly known as: ENTRESTO   1 tablet, Oral, Every 12 Hours Scheduled             Changes to Medications        Instructions Start Date   furosemide 40 MG tablet  Commonly known as: LASIX  What changed:   how much to take  how to take this  when to take this  additional instructions   40 mg, Oral, Daily   Start Date: June 16, 2025     furosemide 20 MG tablet  Commonly known as: LASIX  What changed: You were already taking a medication with the same name, and this prescription was added. Make sure you understand how and when to take each.   20 mg, Oral, Daily With Lunch   Start Date: June 16, 2025            Continue These Medications        Instructions Start Date   albuterol sulfate  (90 Base) MCG/ACT inhaler  Commonly known as: PROVENTIL HFA;VENTOLIN HFA;PROAIR HFA   2 puffs, Inhalation, Every 4 Hours PRN      hydrOXYzine 10 MG tablet  Commonly known as: ATARAX   10-20 mg, Oral, Every 8 Hours PRN      pantoprazole 40 MG EC tablet  Commonly known as:  PROTONIX   40 mg, Oral, 2 Times Daily      potassium chloride 10 MEQ CR tablet   10 mEq, Oral, 2 Times Daily      rivaroxaban 20 MG tablet  Commonly known as: XARELTO   20 mg, Oral, Daily With Dinner             Stop These Medications      atenolol 25 MG tablet  Commonly known as: TENORMIN     losartan 50 MG tablet  Commonly known as: Cozaar                Follow-up:   Follow-up Information       Zehra Shen APRN .    Specialties: Nurse Practitioner, Family Medicine  Contact information:  [address] [address]  [address] [place] [address]  [phone]               Malorie Conti MD Follow up.    Specialty: Cardiology  Why: /Keep appointment on 6/17/25  Contact information:  [facility] [address]  [phone]                             Future Appointments   Date Time Provider Department Center   6/17/2025 12:40 PM Malorie Conti MD [id number]   7/24/2025 10:15 AM Zehra Shen APRN [facility]   9/11/2025  1:30 PM Toni Nieto APRN [id number] None   11/13/2025  9:40 AM Malorie Conti MD [id number]         Time Spent on Discharge:  Less than 30 min         RIKKI Mesa  [facility]   [facility]  [address] [place] [address]  Office: [phone]    06/15/25  13:36 EDT      EMR Dragon/Transcription disclaimer:   Parts of this note may be an electronic transcription/translation of spoken language to printed text using the Dragon dictation system

## 2025-06-16 ENCOUNTER — TRANSITIONAL CARE MANAGEMENT TELEPHONE ENCOUNTER (OUTPATIENT)
Dept: CALL CENTER | Facility: HOSPITAL | Age: 55
End: 2025-06-16
Payer: COMMERCIAL

## 2025-06-16 PROBLEM — I48.91 ATRIAL FIBRILLATION WITH RVR: Status: ACTIVE | Noted: 2025-06-10

## 2025-06-16 NOTE — PAYOR COMM NOTE
"Diandra Orosco JrSadia (54 y.o. Male)                              ATTENTION; DISCHARGE CASE [id number]                         REPLY TO UR DEPT  [phone]           Date of Birth   1970    Social Security Number       Address   [address] [place] [address]    Home Phone   [phone]    MRN   [id number]       Evangel[employer]   [employer]    Marital Status                               Admission Date   2025    Admission Type   Emergency    Admitting Provider   Mlaorie Conti MD    Attending Provider       Department, Room/Bed   [facility] CARDIOVASC UNIT,        Discharge Date   6/15/2025    Discharge Disposition   Home or Self Care    Discharge Destination                                 Attending Provider: (none)   Allergies: Lisinopril-hydrochlorothiazide    Isolation: None   Infection: None   Code Status: Prior    Ht: 182 cm (71.65\")   Wt: 141 kg (311 lb 4.8 oz)    Admission Cmt: None   Principal Problem: Nonsustained ventricular tachycardia [I47.29]                   Active Insurance as of 2025       Primary Coverage       Payor Plan Insurance Group Employer/Plan Group    [facility] BLUE CROSS [id number]       Payor Plan Address Payor Plan Phone Number Payor Plan Fax Number Effective Dates    [address]   2004 - None Entered    [place] [address]         Subscriber Name Subscriber Birth Date Member ID       DIANDRA OROSCO JR. 1970 [id number]                     Emergency Contacts        (Rel.) Home Phone Work Phone Mobile Phone    Mady Orosco (Spouse) -- -- [phone]                 Discharge Summary        Tabby Hinton APRN at 06/15/25 1328       Attestation signed by Thai Alvarez MD at 06/15/25 1346      I have reviewed this documentation and agree.                          [facility]      Patient Name: Diandra Orosco Jr.  :1970  54 y.o.  LOS: 3  Encounter Provider: Tabby" RIKKI Manuel      Date of Admission: 6/12/2025    Date of Discharge:  6/15/2025    Treatment Team:  Patient Care Team:  Zehra Shen APRN as PCP - General (Nurse Practitioner)  Chandra Nicholson MD as Consulting Physician (Cardiology)    Discharge Condition: Good  Discharge Diagnosis:    Nonsustained ventricular tachycardia    Frequent PVCs    Class 3 severe obesity with serious comorbidity and body mass index (BMI) of 40.0 to 44.9 in adult      History of Present Illness:  Jaime Lyle Jr. is a 54 y.o. male who was admitted on 6/12/2025 with obesity, HTN, STEVE, persistent AF, and long standing PVCs.     He was admitted two months ago with a URI and was noted to be in rapid AF. An echo revealed an EF of 40-45%. He was started on metoprolol, rivaroxaban, and losartan. HCTZ was stopped.     He was admitted a few weeks later with symptomatic tachycardia and SOA, Coronary angiography revealed luminal irregularities. His EF had declined to 35-40%. He was loaded with digoxin and ultimately underwent cardioversion.     Since then, his PVCs, which he has had since he was a teenager,  have been much more symptomatic since the spring. He came to the ED and was noted to have very frequent PVCs and runs of NSVT.    Hospital Course:   EP was consulted and started patient on amiodarone. His GDMT was also adjusted and he was started on Entresto, Toprol and Jardiance. MRA to be considered as outpatient based on BP response.      Objective:  Temp:  [97.4 °F (36.3 °C)-97.9 °F (36.6 °C)] 97.6 °F (36.4 °C)  Heart Rate:  [54-77] 67  Resp:  [18-19] 18  BP: (112-134)/(64-80) 134/64    Intake/Output Summary (Last 24 hours) at 6/15/2025 1336  Last data filed at 6/15/2025 1200  Gross per 24 hour   Intake 720 ml   Output 2225 ml   Net -1505 ml     Body mass index is 42.63 kg/m².      06/12/25  1708 06/12/25  1805   Weight: (!) 142 kg (314 lb) (!) 141 kg (311 lb 4.8 oz)     Weight change:     Vitals reviewed.   Constitutional:       " General: Not in acute distress.     Appearance: Well-developed and not in distress. Not diaphoretic.   HENT:      Head: Normocephalic.   Pulmonary:      Effort: Pulmonary effort is normal. No respiratory distress.      Breath sounds: Normal breath sounds. No wheezing. No rhonchi. No rales.   Cardiovascular:      Normal rate. Irregular rhythm.   Pulses:     Radial: 2+ bilaterally.  Edema:     Peripheral edema absent.   Skin:     General: Skin is warm and dry. There is no cyanosis.      Findings: No rash.   Neurological:      Mental Status: Alert and oriented to person, place, and time.   Psychiatric:         Behavior: Behavior normal. Behavior is cooperative.         Thought Content: Thought content normal.         Judgment: Judgment normal.         Procedures Performed:           Pertinent Test Results:  Results from last 7 days   Lab Units 06/15/25  0255 06/14/25  1826 06/13/25  0240 06/12/25  1432 06/10/25  0932   SODIUM mmol/L 138  --  140 139 140   POTASSIUM mmol/L 4.3 4.2 3.6 3.6 4.0   CHLORIDE mmol/L 105  --  103 103 102   CO2 mmol/L 22.0  --  27.1 24.0 25.9   BUN mg/dL 14.0  --  16.0 13.0 14.0   CREATININE mg/dL 1.01  --  0.98 0.94 0.99   GLUCOSE mg/dL 111*  --  120* 123* 99   CALCIUM mg/dL 8.5*  --  9.4 9.4 9.7   AST (SGOT) U/L  --   --   --  20 21   ALT (SGPT) U/L  --   --   --  22 30     Results from last 7 days   Lab Units 06/12/25  1618 06/12/25  1432   HSTROP T ng/L 19 20     Results from last 7 days   Lab Units 06/12/25  1432 06/10/25  0932   WBC 10*3/mm3 9.88 8.95   HEMOGLOBIN g/dL 16.5 16.3   HEMATOCRIT % 49.8 51.8*   PLATELETS 10*3/mm3 257 258         Results from last 7 days   Lab Units 06/13/25  0240 06/12/25  1432 06/10/25  0932   MAGNESIUM mg/dL 2.5 2.3 2.5           Invalid input(s): \"LDLCALC\"  Results from last 7 days   Lab Units 06/10/25  0932   PROBNP pg/mL 357.0           Discharge Diet:    Dietary Orders (From admission, onward)       Start     Ordered    06/12/25 1639  Diet: Cardiac; " Healthy Heart (2-3 Na+); Fluid Consistency: Thin (IDDSI 0)  Diet Effective Now        References:    Diet Order Definitions   Question Answer Comment   Diets: Cardiac    Cardiac Diet: Healthy Heart (2-3 Na+)    Fluid Consistency: Thin (IDDSI 0)        06/12/25 1644                    Activity at Discharge:   As tolerated    Discharge disposition: home     Discharge Medications:     Discharge Medications        New Medications        Instructions Start Date   amiodarone 200 MG tablet  Commonly known as: PACERONE   200 mg, Oral, 2 Times Daily With Meals      empagliflozin 10 MG tablet tablet  Commonly known as: JARDIANCE   10 mg, Oral, Daily      metoprolol succinate XL 25 MG 24 hr tablet  Commonly known as: TOPROL-XL   25 mg, Oral, Every 24 Hours Scheduled   Start Date: June 16, 2025     sacubitril-valsartan 24-26 MG tablet  Commonly known as: ENTRESTO   1 tablet, Oral, Every 12 Hours Scheduled             Changes to Medications        Instructions Start Date   furosemide 40 MG tablet  Commonly known as: LASIX  What changed:   how much to take  how to take this  when to take this  additional instructions   40 mg, Oral, Daily   Start Date: June 16, 2025     furosemide 20 MG tablet  Commonly known as: LASIX  What changed: You were already taking a medication with the same name, and this prescription was added. Make sure you understand how and when to take each.   20 mg, Oral, Daily With Lunch   Start Date: June 16, 2025            Continue These Medications        Instructions Start Date   albuterol sulfate  (90 Base) MCG/ACT inhaler  Commonly known as: PROVENTIL HFA;VENTOLIN HFA;PROAIR HFA   2 puffs, Inhalation, Every 4 Hours PRN      hydrOXYzine 10 MG tablet  Commonly known as: ATARAX   10-20 mg, Oral, Every 8 Hours PRN      pantoprazole 40 MG EC tablet  Commonly known as: PROTONIX   40 mg, Oral, 2 Times Daily      potassium chloride 10 MEQ CR tablet   10 mEq, Oral, 2 Times Daily      rivaroxaban 20 MG  tablet  Commonly known as: XARELTO   20 mg, Oral, Daily With Dinner             Stop These Medications      atenolol 25 MG tablet  Commonly known as: TENORMIN     losartan 50 MG tablet  Commonly known as: Cozaar                Follow-up:   Follow-up Information       Zehra Shen APRN .    Specialties: Nurse Practitioner, Family Medicine  Contact information:  [address] [address]  [address]  [place] [facility]  [phone]               Malorie Cagle MD Follow up.    Specialty: Cardiology  Why: /Keep appointment on 6/17/25  Contact information:  [address] [address]  [phone]                             Future Appointments   Date Time Provider Department Center   6/17/2025 12:40 PM Malorie Cagle MD [id number]   7/24/2025 10:15 AM Zehra Shen APRN [facility]   9/11/2025  1:30 PM Toni Nieto APRN [id number] None   11/13/2025  9:40 AM Malorie Cagle MD [id number]         Time Spent on Discharge:  Less than 30 min         RIKKI Mesa  [facility]   [facility]  [address] [place] [address]  Office: [phone]    06/15/25  13:36 EDT      EMR Dragon/Transcription disclaimer:   Parts of this note may be an electronic transcription/translation of spoken language to printed text using the Dragon dictation system     Electronically signed by Thai Alvarez MD at 06/15/25 1346       Discharge Order (From admission, onward)       Start     Ordered    06/15/25 1321  Discharge patient  Once        Expected Discharge Date: 06/15/25   Discharge Disposition: Home or Self Care   Physician of Record for Attribution - Please select from Treatment Team: MALORIE CAGLE [[id number]]   Review needed by CMO to determine Physician of Record: No      Question Answer Comment   Physician of Record for Attribution - Please select from Treatment Team MALORIE CAGLE    Review needed by CMO to determine Physician of Record No         06/15/25 0426

## 2025-06-16 NOTE — CASE MANAGEMENT/SOCIAL WORK
Case Management Discharge Note      Final Note: Pt discharged home.  No needs identified…....Morelia S/RN CM         Selected Continued Care - Discharged on 6/15/2025 Admission date: 6/12/2025 - Discharge disposition: Home or Self Care      Destination    No services have been selected for the patient.                Durable Medical Equipment    No services have been selected for the patient.                Dialysis/Infusion    No services have been selected for the patient.                Home Medical Care    No services have been selected for the patient.                Therapy    No services have been selected for the patient.                Community Resources    No services have been selected for the patient.                Community & DME    No services have been selected for the patient.                    Transportation Services  Private: Car    Final Discharge Disposition Code: 01 - home or self-care

## 2025-06-16 NOTE — OUTREACH NOTE
Call Center TCM Note      Flowsheet Row Responses   [facility] patient discharged from? [place]   Does the patient have one of the following disease processes/diagnoses(primary or secondary)? Other   TCM attempt successful? Yes   Call start time 1229   Call end time 1231   Discharge diagnosis palpitations   Meds reviewed with patient/caregiver? Yes   Is the patient having any side effects they believe may be caused by any medication additions or changes? No   Does the patient have all medications ordered at discharge? Yes   Is the patient taking all medications as directed (includes completed medication regime)? Yes   Comments PCP appt on 6/30 @ 9:45   Does the patient have an appointment with their PCP within 7-14 days of discharge? Yes   Psychosocial issues? No   Did the patient receive a copy of their discharge instructions? Yes   Nursing interventions Reviewed instructions with patient   What is the patient's perception of their health status since discharge? New symptoms unrelated to diagnosis   Is the patient/caregiver able to teach back signs and symptoms related to disease process for when to call PCP? Yes   Is the patient/caregiver able to teach back signs and symptoms related to disease process for when to call 911? Yes   Is the patient/caregiver able to teach back the hierarchy of who to call/visit for symptoms/problems? PCP, Specialist, Home health nurse, Urgent Care, ED, 911 Yes   If the patient is a current smoker, are they able to teach back resources for cessation? Not a smoker   Additional teach back comments States he had a massive panic attack last night and the hydroxyzine didn't help.   TCM call completed? Yes   Wrap up additional comments Pt states he has a message regarding medication sent via [facility] to PCP office.   Call end time 1231            Karen VINCENT - Licensed Nurse    6/16/2025, 12:32 EDT

## 2025-06-17 ENCOUNTER — OFFICE VISIT (OUTPATIENT)
Dept: FAMILY MEDICINE CLINIC | Facility: CLINIC | Age: 55
End: 2025-06-17
Payer: COMMERCIAL

## 2025-06-17 ENCOUNTER — OFFICE VISIT (OUTPATIENT)
Dept: CARDIOLOGY | Age: 55
End: 2025-06-17
Payer: COMMERCIAL

## 2025-06-17 VITALS
SYSTOLIC BLOOD PRESSURE: 124 MMHG | WEIGHT: 305 LBS | BODY MASS INDEX: 42.7 KG/M2 | DIASTOLIC BLOOD PRESSURE: 66 MMHG | HEART RATE: 75 BPM | HEIGHT: 71 IN

## 2025-06-17 VITALS
WEIGHT: 305 LBS | HEART RATE: 63 BPM | DIASTOLIC BLOOD PRESSURE: 70 MMHG | SYSTOLIC BLOOD PRESSURE: 122 MMHG | HEIGHT: 71 IN | BODY MASS INDEX: 42.7 KG/M2 | OXYGEN SATURATION: 97 %

## 2025-06-17 DIAGNOSIS — I48.91 ATRIAL FIBRILLATION, UNSPECIFIED TYPE: ICD-10-CM

## 2025-06-17 DIAGNOSIS — G47.33 OSA (OBSTRUCTIVE SLEEP APNEA): Primary | ICD-10-CM

## 2025-06-17 DIAGNOSIS — I47.29 NONSUSTAINED VENTRICULAR TACHYCARDIA: Primary | ICD-10-CM

## 2025-06-17 DIAGNOSIS — G47.00 INSOMNIA, UNSPECIFIED TYPE: ICD-10-CM

## 2025-06-17 DIAGNOSIS — I50.31 ACUTE HEART FAILURE WITH PRESERVED EJECTION FRACTION: ICD-10-CM

## 2025-06-17 DIAGNOSIS — I10 ESSENTIAL HYPERTENSION: ICD-10-CM

## 2025-06-17 DIAGNOSIS — I49.3 FREQUENT PVCS: ICD-10-CM

## 2025-06-17 DIAGNOSIS — F41.9 ANXIETY: ICD-10-CM

## 2025-06-17 DIAGNOSIS — R79.89 ELEVATED BRAIN NATRIURETIC PEPTIDE (BNP) LEVEL: ICD-10-CM

## 2025-06-17 DIAGNOSIS — I42.8 NON-ISCHEMIC CARDIOMYOPATHY: ICD-10-CM

## 2025-06-17 PROCEDURE — 99496 TRANSJ CARE MGMT HIGH F2F 7D: CPT | Performed by: NURSE PRACTITIONER

## 2025-06-17 RX ORDER — BUSPIRONE HYDROCHLORIDE 5 MG/1
5 TABLET ORAL 3 TIMES DAILY PRN
Qty: 90 TABLET | Refills: 1 | Status: SHIPPED | OUTPATIENT
Start: 2025-06-17

## 2025-06-17 RX ORDER — ZOLPIDEM TARTRATE 5 MG/1
5 TABLET ORAL NIGHTLY PRN
Qty: 30 TABLET | Refills: 0 | Status: SHIPPED | OUTPATIENT
Start: 2025-06-17

## 2025-06-17 NOTE — PROGRESS NOTES
Transitional Care Follow Up Visit  Subjective     Jaime Lyle Jr. is a 54 y.o. male who presents for a transitional care management visit.    Within 48 business hours after discharge our office contacted him via telephone to coordinate his care and needs.      I reviewed and discussed the details of that call along with the discharge summary, hospital problems, inpatient lab results, inpatient diagnostic studies, and consultation reports with Jaime.     Current outpatient and discharge medications have been reconciled for the patient.  Reviewed by: RIKKI Bone          6/15/2025     6:22 PM   Date of TCM Phone Call   [facility]   Date of Admission 6/12/2025   Date of Discharge 6/15/2025   Discharge Disposition Home or Self Care     Risk for Readmission (LACE) Score: 13 (6/15/2025  6:00 AM)      History of Present Illness   Course During Hospital Stay: Patient presented to [facility] on June 12 with complaints of palpitations.He was admitted two months ago with a URI and was noted to be in rapid AF. An echo revealed an EF of 40-45%. He was started on metoprolol, rivaroxaban, and losartan. HCTZ was stopped.     He was admitted a few weeks later with symptomatic tachycardia and SOA, Coronary angiography revealed luminal irregularities. His EF had declined to 35-40%. He was loaded with digoxin and ultimately underwent cardioversion.     Since then, his PVCs, which he has had since he was a teenager,  have been much more symptomatic since the spring. He came to the ED and was noted to have very frequent PVCs and runs of NSVT.    Hospital Course:   EP was consulted and started patient on amiodarone. His GDMT was also adjusted and he was started on Entresto, Toprol and Jardiance. MRA to be considered as outpatient based on BP response.  Patient was discharged on amiodarone 200 mg twice daily, Jardiance 10 mg daily, metoprolol XL 25 mg daily and Entresto.      He  "was seen by Cardiology today.  Patient underwent cardioversion on May 19. He will have a repeat ECHO in 6 weeks.     Patient is having increased anxiety due to recurrent medical problems. He has Hydroxyzine to use PRN - does not seem to help.  Patient also having difficulty sleeping, reports they gave him Ambien during hospitalization which worked well.        The following portions of the patient's history were reviewed and updated as appropriate: allergies, current medications, past family history, past medical history, past social history, past surgical history, and problem list.    Review of Systems   Respiratory:  Negative for shortness of breath.    Cardiovascular:  Negative for chest pain and leg swelling.   Gastrointestinal:  Negative for constipation and diarrhea.   Neurological:  Negative for dizziness, light-headedness and headaches.       Objective   /70 (BP Location: Left arm, Patient Position: Sitting, Cuff Size: Large Adult)   Pulse 63   Ht 180.3 cm (71\")   Wt (!) 138 kg (305 lb)   SpO2 97%   BMI 42.54 kg/m²     Physical Exam  Constitutional:       Appearance: Normal appearance.   HENT:      Head: Normocephalic.   Cardiovascular:      Rate and Rhythm: Normal rate and regular rhythm.   Pulmonary:      Effort: Pulmonary effort is normal.      Breath sounds: Normal breath sounds.   Abdominal:      General: Abdomen is flat. Bowel sounds are normal.      Palpations: Abdomen is soft.   Musculoskeletal:         General: Normal range of motion.      Cervical back: Neck supple.      Right lower leg: No edema.      Left lower leg: No edema.   Skin:     General: Skin is warm and dry.   Neurological:      Mental Status: He is alert and oriented to person, place, and time.      Gait: Gait is intact.   Psychiatric:         Attention and Perception: Attention normal.         Mood and Affect: Mood normal.         Speech: Speech normal.       Assessment & Plan   Diagnoses and all orders for this " visit:    1. Nonsustained ventricular tachycardia (Primary)    2. Frequent PVCs    3. Essential hypertension    4. Atrial fibrillation, unspecified type    5. Elevated brain natriuretic peptide (BNP) level  -     Basic Metabolic Panel; Future  -     BNP; Future    6. Insomnia, unspecified type  -     zolpidem (Ambien) 5 MG tablet; Take 1 tablet by mouth At Night As Needed for Sleep.  Dispense: 30 tablet; Refill: 0    7. Anxiety  -     busPIRone (BUSPAR) 5 MG tablet; Take 1 tablet by mouth 3 (Three) Times a Day As Needed (anxiety).  Dispense: 90 tablet; Refill: 1      Hospitalization visit notes reviewed  Reviewed cardiology visit note from today  Continue medications as prescribed  Repeat BMP and BNP next week   Keep follow-up for echocardiogram and cardiology visit  Start BuSpar on an as-needed basis for anxiety  Patient may take Ambien nightly as needed for insomnia

## 2025-06-17 NOTE — PROGRESS NOTES
Date of Office Visit: 2025  Encounter Provider: Malorie Conti MD  Place of Service: [facility] CARDIOLOGY  Patient Name: Jaime Lyle Jr.  :1970    Chief complaint  Atrial fibrillation, SVT, PVC, cardiomyopathy    History of Present Illness  Patient is a 54-year-old gentleman with history of a obstructive sleep apnea, obesity, hypertension, persistent atrial fibrillation, longstanding PVCs who was admitted in 2025 with atrial fibrillation rapid rates and heart failure.  Echo showed with a EF 40 to 45% with global hypokinesis, mild valve regurgitation no significant pulm hypertension.  Subsequent stress test was abnormal.  He then underwent cardiac cath that showed mild nonobstruc on 2025 he underwent electrical cardioversion tive disease in the LAD with LVEDP normal at 13 mmHg.  On 2025 he underwent successful electrical cardioversion to sinus rhythm.  In follow-up he was also noted to have however more frequent PVCs and nonsustained tachycardia.  On 2025 echocardiogram showed an ejection fraction of 36% with regional wall motion abnormalities grade 2 diastolic dysfunction, no significant pulmonary hypertension.  He was seen in the emergency room and observed overnight.  EP service saw patient and started him on amiodarone.  He was started on Entresto Jardiance and Toprol.  He was discharged on 2025 on these meds along with Xarelto.      Since dismissal he is using CPAP consistently.  He is walking a mile a day he has had no palpitation shortness of breath dizziness chest pain.  Edema has improved.  He complains of slig.  He has lost 5 pounds.  Htly dry mouth which he attributes to Entresto.    Past Medical History:   Diagnosis Date    Abnormal ECG     Caffeine induced pvcs    Acid reflux     Acute medial meniscus tear     left knee    Arrhythmia     Atrial fibrillation     CHF (congestive heart failure) 2025    Community acquired  pneumonia     Hyperlipidemia 2021    Hypertension 2025    Morbid obesity     STEVE (obstructive sleep apnea)     Personal history of gastric banding 01/10/2024    Pulmonic regurgitation     trace    PVCs (premature ventricular contractions)     Vitamin D deficiency 09/11/2024     Past Surgical History:   Procedure Laterality Date    CARDIAC CATHETERIZATION N/A 5/2/2025    Procedure: Left Heart Cath;  Surgeon: Ray Castellanos MD;  Location: [facility] INVAS[facility] LOC[facility];  Service: Cardiovascular;  Laterality: N/A;    LAPAROSCOPIC GASTRIC BANDING      ROTATOR CUFF REPAIR       Outpatient Medications Prior to Visit   Medication Sig Dispense Refill    albuterol sulfate  (90 Base) MCG/ACT inhaler Inhale 2 puffs Every 4 (Four) Hours As Needed for Wheezing. 8 g 11    amiodarone (PACERONE) 200 MG tablet Take 1 tablet by mouth 2 (Two) Times a Day With Meals. 180 tablet 0    empagliflozin (JARDIANCE) 10 MG tablet tablet Take 1 tablet by mouth Daily. 90 tablet 0    furosemide (LASIX) 20 MG tablet Take 1 tablet by mouth Daily With Lunch. 90 tablet 3    furosemide (LASIX) 40 MG tablet Take 1 tablet by mouth Daily. (Patient taking differently: Take 1 tablet by mouth Every Morning.) 90 tablet 3    metoprolol succinate XL (TOPROL-XL) 25 MG 24 hr tablet Take 1 tablet by mouth Daily. 90 tablet 3    rivaroxaban (XARELTO) 20 MG tablet Take 1 tablet by mouth Daily With Dinner. Indications: Atrial Fibrillation 90 tablet 1    sacubitril-valsartan (ENTRESTO) 24-26 MG tablet Take 1 tablet by mouth Every 12 (Twelve) Hours. 180 tablet 0    pantoprazole (PROTONIX) 40 MG EC tablet TAKE 1 TABLET BY MOUTH 2 TIMES A  tablet 0    potassium chloride 10 MEQ CR tablet Take 1 tablet by mouth 2 (Two) Times a Day. 180 tablet 1    hydrOXYzine (ATARAX) 10 MG tablet Take 1-2 tablets by mouth Every 8 (Eight) Hours As Needed for Anxiety. 30 tablet 0     No facility-administered medications prior to visit.       Allergies as of 06/17/2025 -  "Reviewed 06/17/2025   Allergen Reaction Noted    Lisinopril-hydrochlorothiazide Cough 04/30/2025     Social History     Socioeconomic History    Marital status:    Tobacco Use    Smoking status: Light Smoker     Types: Cigars     Passive exposure: Current    Smokeless tobacco: Never    Tobacco comments:     1 cigar monthly   Vaping Use    Vaping status: Never Used   Substance and Sexual Activity    Alcohol use: Yes     Comment: caffeine use    Drug use: Never    Sexual activity: Defer     Family History   Problem Relation Age of Onset    Heart disease Mother 50    Arrhythmia Mother     Diabetes Father     Sudden death Maternal Uncle 52    Stroke Maternal Grandmother 50    Diabetes Paternal Grandmother     Diabetes Paternal Grandfather     Hypertension Other     Heart disease Other         ischemic    Diabetes Other      Review of Systems   Constitutional: Negative for chills, fever, weight gain and weight loss.   Cardiovascular:  Positive for leg swelling.   Respiratory:  Negative for cough, snoring and wheezing.    Hematologic/Lymphatic: Negative for bleeding problem. Does not bruise/bleed easily.   Skin:  Negative for color change.   Musculoskeletal:  Negative for falls, joint pain and myalgias.   Gastrointestinal:  Negative for melena.   Genitourinary:  Negative for hematuria.   Neurological:  Negative for excessive daytime sleepiness.   Psychiatric/Behavioral:  Negative for depression. The patient is not nervous/anxious.         Objective:     Vitals:    06/17/25 1251   BP: 124/66   Pulse: 75   Weight: (!) 138 kg (305 lb)   Height: 180.3 cm (71\")     Body mass index is 42.54 kg/m².    Vitals reviewed.   Constitutional:       Appearance: Well-developed. Morbidly obese.   Eyes:      General: No scleral icterus.        Right eye: No discharge.      Conjunctiva/sclera: Conjunctivae normal.      Pupils: Pupils are equal, round, and reactive to light.   HENT:      Head: Normocephalic.      Nose: Nose normal. "   Neck:      Thyroid: No thyromegaly.      Vascular: No JVD.   Pulmonary:      Effort: Pulmonary effort is normal. No respiratory distress.      Breath sounds: Normal breath sounds. No wheezing. No rales.   Cardiovascular:      Normal rate. Occasional ectopic beats. Regular rhythm. Normal S1. Normal S2.       Murmurs: There is no murmur.      No gallop.    Pulses:     Intact distal pulses.      Carotid: 2+ bilaterally.     Radial: 2+ bilaterally.     Femoral: 2+ bilaterally.     Popliteal: 2+ bilaterally.     Dorsalis pedis: 2+ bilaterally.     Posterior tibial: 2+ bilaterally.  Edema:     Peripheral edema absent.   Abdominal:      General: Bowel sounds are normal. There is no distension.      Palpations: Abdomen is soft.      Tenderness: There is no abdominal tenderness. There is no rebound.   Musculoskeletal: Normal range of motion.         General: No tenderness.      Cervical back: Normal range of motion and neck supple. Skin:     General: Skin is warm and dry.      Findings: No erythema or rash.   Neurological:      Mental Status: Alert and oriented to person, place, and time.   Psychiatric:         Behavior: Behavior normal.         Thought Content: Thought content normal.         Judgment: Judgment normal.       Lab Review:   Lab Results - Last 18 Months   Lab Units 06/12/25  1432 06/10/25  0932 05/19/25  0402 05/18/25  2302   WBC 10*3/mm3 9.88 8.95   < > 10.43   RBC 10*6/mm3 6.00* 5.99*   < > 5.73   HEMOGLOBIN g/dL 16.5 16.3   < > 15.8   HEMATOCRIT % 49.8 51.8*   < > 49.7   MCV fL 83.0 86.5   < > 86.7   MCH pg 27.5 27.2   < > 27.6   MCHC g/dL 33.1 31.5   < > 31.8   RDW % 13.4 13.9   < > 13.7   PLATELETS 10*3/mm3 257 258   < > 253   NEUTROPHIL % % 67.2  --   --  59.3   LYMPHOCYTE % % 25.3  --   --  29.6   MONOCYTES % % 5.7  --   --  8.1   EOSINOPHIL % % 1.0  --   --  1.9   BASOPHIL % % 0.2  --   --  0.4   NEUTROS ABS 10*3/mm3 6.64  --   --  6.19   LYMPHS ABS 10*3/mm3 2.50  --   --  3.09   MONOS ABS 10*3/mm3  0.56  --   --  0.84   EOS ABS 10*3/mm3 0.10  --   --  0.20   BASOS ABS 10*3/mm3 0.02  --   --  0.04   RDW-SD fl 40.6 43.1   < > 44.0   MPV fL 9.2 10.1   < > 9.2    < > = values in this interval not displayed.     Lab Results - Last 18 Months   Lab Units 06/25/25  0921 06/15/25  0255   GLUCOSE mg/dL 100* 111*   BUN mg/dL 17.0 14.0   CREATININE mg/dL 1.24 1.01   SODIUM mmol/L 139 138   POTASSIUM mmol/L 4.4 4.3   CHLORIDE mmol/L 104 105   CO2 mmol/L 27.1 22.0   CALCIUM mg/dL 9.4 8.5*   BUN / CREAT RATIO  13.7 13.9   ANION GAP mmol/L 7.9 11.0   EGFR mL/min/1.73 68.2 87.3     Lab Results - Last 18 Months   Lab Units 06/25/25  0921 06/15/25  0255 06/13/25  0240 06/12/25  1432 06/10/25  0932   GLUCOSE mg/dL 100* 111*   < > 123* 99   BUN mg/dL 17.0 14.0   < > 13.0 14.0   CREATININE mg/dL 1.24 1.01   < > 0.94 0.99   SODIUM mmol/L 139 138   < > 139 140   POTASSIUM mmol/L 4.4 4.3   < > 3.6 4.0   CHLORIDE mmol/L 104 105   < > 103 102   CO2 mmol/L 27.1 22.0   < > 24.0 25.9   CALCIUM mg/dL 9.4 8.5*   < > 9.4 9.7   TOTAL PROTEIN g/dL  --   --   --  7.5 7.3   ALBUMIN g/dL  --   --   --  4.1 4.0   ALT (SGPT) U/L  --   --   --  22 30   AST (SGOT) U/L  --   --   --  20 21   ALK PHOS U/L  --   --   --  73 68   BILIRUBIN mg/dL  --   --   --  0.8 0.9   GLOBULIN gm/dL  --   --   --  3.4 3.3   A/G RATIO g/dL  --   --   --  1.2 1.2   BUN / CREAT RATIO  13.7 13.9   < > 13.8 14.1   ANION GAP mmol/L 7.9 11.0   < > 12.0 12.1   EGFR mL/min/1.73 68.2 87.3   < > 95.1 89.4    < > = values in this interval not displayed.     Lab Results - Last 18 Months   Lab Units 01/24/25  1313 01/10/24  1458   CHOLESTEROL mg/dL 205* 178   TRIGLYCERIDES mg/dL 287* 251*   HDL CHOL mg/dL 37* 31*   LDL CHOL mg/dL 118* 104*   VLDL CHOL mg/dL 50* 43*   LDL/HDL RATIO  2.99 3.12     Lab Results - Last 18 Months   Lab Units 06/25/25  0921 06/10/25  0932   PROBNP pg/mL 617.0 357.0     Lab Results - Last 18 Months   Lab Units 06/12/25  1618 06/12/25  1432   HSTROP T ng/L 19  20     Lab Results - Last 18 Months   Lab Units 04/28/25 2014 01/24/25  1313   TSH uIU/mL 2.820 1.720     Lab Results - Last 18 Months   Lab Units 04/28/25  2137   PROTIME Seconds 15.8*   APTT seconds 25.4           ECG 12 Lead    Date/Time: 6/17/2025 1:19 PM  Performed by: Malorie Conti MD    Authorized by: Malorie Conti MD  Comparison: compared with previous ECG   Comparison to previous ECG: Ventricular couplets no longer present  Rhythm: sinus rhythm  Ectopy: unifocal PVCs  Conduction: non-specific intraventricular conduction delay  Other findings: non-specific ST-T wave changes    Clinical impression: abnormal EKG            Diagnosis Plan   1. STEVE (obstructive sleep apnea)  ECG 12 Lead    Ambulatory Referral to Sleep Medicine      2. Acute heart failure with preserved ejection fraction  ECG 12 Lead    Adult Transthoracic Echo Limited W/ Cont if Necessary Per Protocol      3. Non-ischemic cardiomyopathy  ECG 12 Lead    Adult Transthoracic Echo Limited W/ Cont if Necessary Per Protocol      4. Frequent PVCs        5. Atrial fibrillation, unspecified type          Plan:       1.  PVCs of nonsustained tachycardia.  Currently on amiodarone.  Still likely has PVCs.  But remains asymptomatic.  Patient is scheduled for A-fib ablation still in August.  Will reach out to EP service to determine if that is still the plan of care.  2.  Paroxysmal atrial fibrillation, s/p cardioversion 5/2025.  Currently in sinus rhythm.  Will ask EP service to review.  Current plans for A-fib ablation 8/2025  3.  Nonischemic cardiomyopathy EF 36%.  Cardiac cath with mild disease on 5/2025.  On goal-directed medical therapy with Entresto,Toprol, Jardiance, potassium, Lasix.  Continue with these for now.  Ask sleep medicine to review CPAP settings especially with recent weight gain.  He wishes to see a different provider and with this in mind we will place consult to sleep medicine  4.  Nonobstructive coronary disease  5.  Obstructive sleep  apnea, as above  6.  Hypertension, controlled  7.  Dyslipidemia                Your medication list            Accurate as of June 17, 2025 11:59 PM. If you have any questions, ask your nurse or doctor.                START taking these medications        Instructions Last Dose Given Next Dose Due   busPIRone 5 MG tablet  Commonly known as: BUSPAR  Started by: Zehra Shen      Take 1 tablet by mouth 3 (Three) Times a Day As Needed (anxiety).       zolpidem 5 MG tablet  Commonly known as: Ambien  Started by: Zehra Shen      Take 1 tablet by mouth At Night As Needed for Sleep.              CHANGE how you take these medications        Instructions Last Dose Given Next Dose Due   furosemide 40 MG tablet  Commonly known as: LASIX  What changed: when to take this      Take 1 tablet by mouth Daily.       furosemide 20 MG tablet  Commonly known as: LASIX  What changed: Another medication with the same name was changed. Make sure you understand how and when to take each.      Take 1 tablet by mouth Daily With Lunch.              CONTINUE taking these medications        Instructions Last Dose Given Next Dose Due   albuterol sulfate  (90 Base) MCG/ACT inhaler  Commonly known as: PROVENTIL HFA;VENTOLIN HFA;PROAIR HFA      Inhale 2 puffs Every 4 (Four) Hours As Needed for Wheezing.       amiodarone 200 MG tablet  Commonly known as: PACERONE      Take 1 tablet by mouth 2 (Two) Times a Day With Meals.       Entresto 24-26 MG tablet  Generic drug: sacubitril-valsartan      Take 1 tablet by mouth Every 12 (Twelve) Hours.       Jardiance 10 MG tablet tablet  Generic drug: empagliflozin      Take 1 tablet by mouth Daily.       metoprolol succinate XL 25 MG 24 hr tablet  Commonly known as: TOPROL-XL      Take 1 tablet by mouth Daily.       pantoprazole 40 MG EC tablet  Commonly known as: PROTONIX      TAKE 1 TABLET BY MOUTH 2 TIMES A DAY       potassium chloride 10 MEQ CR tablet      Take 1 tablet by mouth 2 (Two)  Times a Day.       rivaroxaban 20 MG tablet  Commonly known as: XARELTO      Take 1 tablet by mouth Daily With Dinner. Indications: Atrial Fibrillation                 Where to Get Your Medications        These medications were sent to [facility] [id number] - [place], [place] - [address] [place] [address] AT [facility] - [phone]  - [phone] [address], [facility] [address]      Phone: [phone]   busPIRone 5 MG tablet  zolpidem 5 MG tablet         Patient is no longer taking -.  I corrected the med list to reflect this.  I did not stop these medications.      Dictated utilizing Dragon dictation

## 2025-06-19 DIAGNOSIS — K21.9 GASTROESOPHAGEAL REFLUX DISEASE, UNSPECIFIED WHETHER ESOPHAGITIS PRESENT: ICD-10-CM

## 2025-06-19 RX ORDER — PANTOPRAZOLE SODIUM 40 MG/1
40 TABLET, DELAYED RELEASE ORAL 2 TIMES DAILY
Qty: 180 TABLET | Refills: 0 | Status: SHIPPED | OUTPATIENT
Start: 2025-06-19

## 2025-06-23 ENCOUNTER — TELEPHONE (OUTPATIENT)
Dept: CARDIOLOGY | Age: 55
End: 2025-06-23
Payer: COMMERCIAL

## 2025-06-23 RX ORDER — POTASSIUM CHLORIDE 750 MG/1
10 TABLET, EXTENDED RELEASE ORAL 2 TIMES DAILY
Qty: 180 TABLET | Refills: 1 | Status: SHIPPED | OUTPATIENT
Start: 2025-06-23

## 2025-06-24 NOTE — TELEPHONE ENCOUNTER
The patient is scheduled for A-fib ablation on 8/8/2025 but he has had troubles with PVCs ventricular tachycardia cardiomyopathy and was started on amiodarone.  Also troubled with bradycardia.  He is to follow-up with sleep medicine in the near future.  In the meanwhile he is wondering about plans for additional EP evaluation/treatment in the setting of PVCs and amiodarone use.  Is the plan still to consider A-fib ablation.  Can you please address.?  Does he need to see EP provider prior to ablation which is still currently scheduled for August.?

## 2025-06-25 ENCOUNTER — LAB (OUTPATIENT)
Dept: FAMILY MEDICINE CLINIC | Facility: CLINIC | Age: 55
End: 2025-06-25
Payer: COMMERCIAL

## 2025-06-25 DIAGNOSIS — R79.89 ELEVATED BRAIN NATRIURETIC PEPTIDE (BNP) LEVEL: ICD-10-CM

## 2025-06-25 LAB
ANION GAP SERPL CALCULATED.3IONS-SCNC: 7.9 MMOL/L (ref 5–15)
BUN SERPL-MCNC: 17 MG/DL (ref 6–20)
BUN/CREAT SERPL: 13.7 (ref 7–25)
CALCIUM SPEC-SCNC: 9.4 MG/DL (ref 8.6–10.5)
CHLORIDE SERPL-SCNC: 104 MMOL/L (ref 98–107)
CO2 SERPL-SCNC: 27.1 MMOL/L (ref 22–29)
CREAT SERPL-MCNC: 1.24 MG/DL (ref 0.76–1.27)
EGFRCR SERPLBLD CKD-EPI 2021: 68.2 ML/MIN/1.73
GLUCOSE SERPL-MCNC: 100 MG/DL (ref 65–99)
NT-PROBNP SERPL-MCNC: 617 PG/ML (ref 0–900)
POTASSIUM SERPL-SCNC: 4.4 MMOL/L (ref 3.5–5.2)
SODIUM SERPL-SCNC: 139 MMOL/L (ref 136–145)

## 2025-06-25 PROCEDURE — 83880 ASSAY OF NATRIURETIC PEPTIDE: CPT | Performed by: NURSE PRACTITIONER

## 2025-06-25 PROCEDURE — 80048 BASIC METABOLIC PNL TOTAL CA: CPT | Performed by: NURSE PRACTITIONER

## 2025-06-25 PROCEDURE — 36415 COLL VENOUS BLD VENIPUNCTURE: CPT

## 2025-06-25 RX ORDER — POTASSIUM CHLORIDE 750 MG/1
10 TABLET, EXTENDED RELEASE ORAL DAILY
Qty: 30 TABLET | OUTPATIENT
Start: 2025-06-25

## 2025-06-26 ENCOUNTER — READMISSION MANAGEMENT (OUTPATIENT)
Dept: CALL CENTER | Facility: HOSPITAL | Age: 55
End: 2025-06-26
Payer: COMMERCIAL

## 2025-06-26 NOTE — OUTREACH NOTE
Medical Week 2 Survey      Flowsheet Row Responses   [facility] patient discharged from? [place]   Does the patient have one of the following disease processes/diagnoses(primary or secondary)? Other   Week 2 attempt successful? Yes   Call start time 1355   Discharge diagnosis Nonsustained ventricular tachycardia    Frequent PVCs   Call end time 1358   Person spoke with today (if not patient) and relationship Patient   Meds reviewed with patient/caregiver? Yes   Does the patient have all medications ordered at discharge? Yes   Is the patient taking all medications as directed (includes completed medication regime)? Yes   Comments regarding appointments 7/17/2025  9:30 AM Arrive by 9:15 AM FOLLOW UP 15 min [facility] CARDIOLOGY Jim Cornelius MD   Does the patient have a primary care provider?  Yes   Does the patient have an appointment with their PCP within 7 days of discharge? Greater than 7 days   Comments regarding PCP 7/24/2025 10:15 AM Arrive by 10:00 AM OFFICE VISIT 15 min [facility] PRIMARY CARE Zehra Shen APRN   Nursing Interventions Verified appointment date/time/provider   Has the patient kept scheduled appointments due by today? Yes   Comments 6/30/2025  9:30 AM  CONSULT SLEEP CLINIC 30 min [facility] SLEEP MEDICINE Sean Sales MD   Has home health visited the patient within 72 hours of discharge? N/A   Psychosocial issues? No   Did the patient receive a copy of their discharge instructions? Yes   Nursing interventions Reviewed instructions with patient   What is the patient's perception of their health status since discharge? Improving   Is the patient/caregiver able to teach back signs and symptoms related to disease process for when to call PCP? Yes   Is the patient/caregiver able to teach back signs and symptoms related to disease process for when to call 911? Yes   Is the patient/caregiver able to teach back the hierarchy  of who to call/visit for symptoms/problems? PCP, Specialist, Home health nurse, Urgent Care, ED, 911 Yes   Week 2 Call Completed? Yes   Graduated Yes   Is the patient interested in additional calls from an ambulatory ? No   Would this patient benefit from a Referral to [facility] Social Work? No   Graduated/Revoked comments Patient reports that he is doing better. He denies any questions or needs today. Declines need for further follow up calls. Goals met.   Call end time 1358            REHAN MAS - Registered Nurse

## 2025-06-30 ENCOUNTER — OFFICE VISIT (OUTPATIENT)
Dept: SLEEP MEDICINE | Facility: HOSPITAL | Age: 55
End: 2025-06-30
Payer: COMMERCIAL

## 2025-06-30 VITALS — OXYGEN SATURATION: 95 % | HEIGHT: 71 IN | BODY MASS INDEX: 43.26 KG/M2 | HEART RATE: 39 BPM | WEIGHT: 309 LBS

## 2025-06-30 DIAGNOSIS — R05.3 CHRONIC COUGH: ICD-10-CM

## 2025-06-30 DIAGNOSIS — G47.33 OSA (OBSTRUCTIVE SLEEP APNEA): Primary | ICD-10-CM

## 2025-06-30 PROCEDURE — G0463 HOSPITAL OUTPT CLINIC VISIT: HCPCS

## 2025-06-30 RX ORDER — GABAPENTIN 300 MG/1
300 CAPSULE ORAL NIGHTLY
Qty: 30 CAPSULE | Refills: 2 | Status: SHIPPED | OUTPATIENT
Start: 2025-06-30

## 2025-06-30 NOTE — PROGRESS NOTES
[facility]      Patient Care Team:  Zehra Shen APRN as PCP - General (Nurse Practitioner)  Chandra Nicholson MD as Consulting Physician (Cardiology)    Referring Provider: Malorie Conti MD    Chief complaint:   Referred for evaluation of sleep apnea due to A fib    History of present illness:  This is a 54-year-old male patient with hx of CHF (EF 36%) and A/ Fib (Dr. Conti).   s/p cardioversion 6/11/25.     STEVE:  Patient stated that he had a sleep study back in 2008 as workup for gastric banding.  He does not recall of the sleep test showed but he does not think that he was diagnosed with sleep apnea as he was not started on treatment.  We do not have access to this test.     HST 10/16/18 (274):  VI: 5.1/h. Supine VI: 17.2/h; MOON= 5.5 /h; Jill SpO2= 87 %; Hypoxic burden: 0.4 min.    HST 9/27/2024: VI 22/h; jill SpO2 87%.    Symptoms: loud snoring. waking up coughing. Dry mouth.      Received a CPAP in October 2024. Tried FFM then hybrid FFM which he was able to tolerate then he had issue with A fib/CHF for which he was started on Entresto which caused cough and then he was not able to tolerate the CPAP due to cough. Now he has his bed elevated and that helped the snoring. Cough started April 2025.     Sleep schedule:  -Bedtime: 9-9:30 PM- now takes Ambien  -Sleep latency: 30-60 minutes  -Wake up time: 5- 7 AM.  He feels tired on some days and rested on other days.  -Nocturnal awakening: 3 times because of nocturia.  No difficulties going back to sleep.      ESS: Total score: 5       Apparently, patient underwent gastric banding and ended up losing about 100 pounds over he stated that the band slipped off.      Data:  Echo 6/11/2025: Grade 2 diastolic dysfunction; LA moderately increased; EF 36%.    Review of Systems  Constitutional: No fever or chills. No changes in appetite.   ENMT: Nasal congestion but no postnasal drip, hoarsness  Cardiovascular: No  chest pain or legs swelling but  palpitation  Respiratory: No dyspnea or wheezing but cough  Gastrointestinal: No constipation, diarrhea, abdominal pain or acid reflux  Neurology: No headache, weakness, numbness but dizziness.   Musculoskeletal: Joints pain and swelling.   Psychiatry: No depression, anxiety or irritability.   Hem/Lymphatic: No swollen glands or easy bruising.  Integumentary: No rash.  Endocrinology: No excessive thirst, cold or warm intolerance.   Urinary: No dysuria, bloody urine or frequent urination.     History  Past Medical History:   Diagnosis Date    Abnormal ECG 2010    Caffeine induced pvcs    Acid reflux     Acute medial meniscus tear     left knee    Arrhythmia 2010    Atrial fibrillation 2025    CHF (congestive heart failure) 5/2025    Community acquired pneumonia     Hyperlipidemia 2021    Hypertension 2025    Morbid obesity     STEVE (obstructive sleep apnea)     Personal history of gastric banding 01/10/2024    Pulmonic regurgitation     trace    PVCs (premature ventricular contractions)     Vitamin D deficiency 09/11/2024   ,   Past Surgical History:   Procedure Laterality Date    CARDIAC CATHETERIZATION N/A 5/2/2025    Procedure: Left Heart Cath;  Surgeon: Ray Castellanos MD;  Location: [facility] INVASIVE LOCATION;  Service: Cardiovascular;  Laterality: N/A;    LAPAROSCOPIC GASTRIC BANDING      ROTATOR CUFF REPAIR     ,   Family History   Problem Relation Age of Onset    Heart disease Mother 50    Arrhythmia Mother     Diabetes Father     Sudden death Maternal Uncle 52    Stroke Maternal Grandmother 50    Diabetes Paternal Grandmother     Diabetes Paternal Grandfather     Hypertension Other     Heart disease Other         ischemic    Diabetes Other    ,   Social History     Tobacco Use    Smoking status: Light Smoker     Types: Cigars     Passive exposure: Current    Smokeless tobacco: Never    Tobacco comments:     1 cigar monthly   Vaping Use    Vaping status: Never Used  "  Substance Use Topics    Alcohol use: Yes     Comment: caffeine use    Drug use: Never    and Allergies:  Lisinopril-hydrochlorothiazide    Medications:    Current Outpatient Medications:     albuterol sulfate  (90 Base) MCG/ACT inhaler, Inhale 2 puffs Every 4 (Four) Hours As Needed for Wheezing., Disp: 8 g, Rfl: 11    amiodarone (PACERONE) 200 MG tablet, Take 1 tablet by mouth 2 (Two) Times a Day With Meals., Disp: 180 tablet, Rfl: 0    busPIRone (BUSPAR) 5 MG tablet, Take 1 tablet by mouth 3 (Three) Times a Day As Needed (anxiety)., Disp: 90 tablet, Rfl: 1    empagliflozin (JARDIANCE) 10 MG tablet tablet, Take 1 tablet by mouth Daily., Disp: 90 tablet, Rfl: 0    furosemide (LASIX) 20 MG tablet, Take 1 tablet by mouth Daily With Lunch., Disp: 90 tablet, Rfl: 3    furosemide (LASIX) 40 MG tablet, Take 1 tablet by mouth Daily. (Patient taking differently: Take 1 tablet by mouth Every Morning.), Disp: 90 tablet, Rfl: 3    metoprolol succinate XL (TOPROL-XL) 25 MG 24 hr tablet, Take 1 tablet by mouth Daily., Disp: 90 tablet, Rfl: 3    pantoprazole (PROTONIX) 40 MG EC tablet, TAKE 1 TABLET BY MOUTH 2 TIMES A DAY, Disp: 180 tablet, Rfl: 0    potassium chloride 10 MEQ CR tablet, Take 1 tablet by mouth 2 (Two) Times a Day., Disp: 180 tablet, Rfl: 1    rivaroxaban (XARELTO) 20 MG tablet, Take 1 tablet by mouth Daily With Dinner. Indications: Atrial Fibrillation, Disp: 90 tablet, Rfl: 1    sacubitril-valsartan (ENTRESTO) 24-26 MG tablet, Take 1 tablet by mouth Every 12 (Twelve) Hours., Disp: 180 tablet, Rfl: 0    zolpidem (Ambien) 5 MG tablet, Take 1 tablet by mouth At Night As Needed for Sleep., Disp: 30 tablet, Rfl: 0    gabapentin (Neurontin) 300 MG capsule, Take 1 capsule by mouth Every Night., Disp: 30 capsule, Rfl: 2      Vital Signs:  Vitals:    06/30/25 0948   Pulse: (!) 39   SpO2: 95%   Weight: (!) 140 kg (309 lb)   Height: 180.3 cm (71\")       Body mass index is 43.1 kg/m².  Neck Circumference: 19.5 inches "     Physical Exam:  Neck Circumference: 19.5 inches     Constitutional: Not in acute distress.  Eyes: Injected conjunctiva, EOMI.  ENMT: Palmer score 3. Mallampati score 3. No oral thrush. Tonsils grade . Large tongue.  Neck: Large. No thyromegaly.    Heart: Regular rhythm and rate, no murmur  Lungs: Good and equal air entry bilaterally. No crackles or wheezing.         Abdomen: Obese. Soft.  No tenderness  Extremities: No cyanosis, clubbing or pitting edema. Moves all extremities.  Neuro: Conscious, alert, oriented x3.   Psych: Appropriate mood and affect.    Integumentary: No rash  Lymphatic: No palpable cervical or supraclavicular lymph nodes.         Assessment:    STEVE  Morbid obesity, BMI 43  A fib s/p cardioversion  Chronic HFrEF  Chronic cough, presumably 2ary to Entresto      Plan:  Restart CPAP.  Discussed the importance for compliance with PAP therapy in the setting of comorbid CHF and A-fib.  He did use the CPAP 1 time on April 5th of this year and his AHI was 0.7 indicating adequate control with current settings.  His lack of compliance now is mostly related to cough.    Trial of Neurontin for cough. Could go off Ambien.     Check BP at home and report to Dr. Conti    I counseled the patient for weight loss.  I informed him that losing weight he decrease the severity of sleep apnea in obvious need of CPAP therapy        Sean Sales MD  06/30/25  10:28 EDT    This note was dictated utilizing Dragon dictation

## 2025-07-06 DIAGNOSIS — I48.91 ATRIAL FIBRILLATION, UNSPECIFIED TYPE: ICD-10-CM

## 2025-07-06 DIAGNOSIS — Z00.00 ANNUAL PHYSICAL EXAM: ICD-10-CM

## 2025-07-07 RX ORDER — ALBUTEROL SULFATE 90 UG/1
2 INHALANT RESPIRATORY (INHALATION) EVERY 4 HOURS PRN
Qty: 8.5 G | Refills: 11 | Status: SHIPPED | OUTPATIENT
Start: 2025-07-07

## 2025-07-17 ENCOUNTER — OFFICE VISIT (OUTPATIENT)
Age: 55
End: 2025-07-17
Payer: COMMERCIAL

## 2025-07-17 VITALS
HEIGHT: 71 IN | SYSTOLIC BLOOD PRESSURE: 134 MMHG | DIASTOLIC BLOOD PRESSURE: 90 MMHG | BODY MASS INDEX: 43.54 KG/M2 | WEIGHT: 311 LBS | HEART RATE: 56 BPM

## 2025-07-17 DIAGNOSIS — E66.813 CLASS 3 SEVERE OBESITY WITH SERIOUS COMORBIDITY AND BODY MASS INDEX (BMI) OF 40.0 TO 44.9 IN ADULT: ICD-10-CM

## 2025-07-17 DIAGNOSIS — I47.29 NONSUSTAINED VENTRICULAR TACHYCARDIA: ICD-10-CM

## 2025-07-17 DIAGNOSIS — I42.0 CARDIOMYOPATHY, DILATED: ICD-10-CM

## 2025-07-17 DIAGNOSIS — I48.19 PERSISTENT ATRIAL FIBRILLATION: Primary | ICD-10-CM

## 2025-07-17 PROCEDURE — 93000 ELECTROCARDIOGRAM COMPLETE: CPT | Performed by: INTERNAL MEDICINE

## 2025-07-17 PROCEDURE — 99214 OFFICE O/P EST MOD 30 MIN: CPT | Performed by: INTERNAL MEDICINE

## 2025-07-17 NOTE — PROGRESS NOTES
Date of Office Visit: 2025  Encounter Provider: Jim Cornelius MD  Place of Service: [facility]  Patient Name: Jaime Lyle Jr.  :1970    Chief Complaint   Patient presents with    NSVT    frequent PVCs    persistent AFIB   :     HPI: Jaime Lyle Jr. is a 54 y.o. male who presents today for persistent atrial fibrillation, premature ventricular contractions.    He presented in April with symptomatic atrial fibrillation, ultimately cardioverted and placed on amiodarone.  He also has a history of nonsustained VT.    He appears to have a mild nonischemic cardiomyopathy, possibly tachycardia induced versus idiopathic.    We are planning on ablation on .    We discussed the plan today.  I think we should continue to proceed with A-fib ablation.  With a goal can off amiodarone.  If he then had recurrent NSVT we could address that          Past Medical History:   Diagnosis Date    Abnormal ECG     Caffeine induced pvcs    Acid reflux     Acute medial meniscus tear     left knee    Arrhythmia     Atrial fibrillation     CHF (congestive heart failure) 2025    Community acquired pneumonia     Hyperlipidemia     Hypertension     Morbid obesity     STEVE (obstructive sleep apnea)     Personal history of gastric banding 01/10/2024    Pulmonic regurgitation     trace    PVCs (premature ventricular contractions)     Vitamin D deficiency 2024       Past Surgical History:   Procedure Laterality Date    CARDIAC CATHETERIZATION N/A 2025    Procedure: Left Heart Cath;  Surgeon: Ray Castellanos MD;  Location: [facility] CATH INVASIVE LOCATION;  Service: Cardiovascular;  Laterality: N/A;    LAPAROSCOPIC GASTRIC BANDING      ROTATOR CUFF REPAIR         Social History     Socioeconomic History    Marital status:    Tobacco Use    Smoking status: Light Smoker     Types: Cigars     Passive exposure: Current    Smokeless tobacco: Never     Tobacco comments:     1 cigar monthly   Vaping Use    Vaping status: Never Used   Substance and Sexual Activity    Alcohol use: Yes     Comment: caffeine use    Drug use: Never    Sexual activity: Defer       Family History   Problem Relation Age of Onset    Heart disease Mother 50    Arrhythmia Mother     Diabetes Father     Sudden death Maternal Uncle 52    Stroke Maternal Grandmother 50    Diabetes Paternal Grandmother     Diabetes Paternal Grandfather     Hypertension Other     Heart disease Other         ischemic    Diabetes Other        Review of Systems   Constitutional: Negative.   Cardiovascular: Negative.    Respiratory: Negative.     Gastrointestinal: Negative.        Allergies   Allergen Reactions    Lisinopril-Hydrochlorothiazide Cough         Current Outpatient Medications:     albuterol sulfate  (90 Base) MCG/ACT inhaler, Inhale 2 puffs Every 4 (Four) Hours As Needed for Wheezing., Disp: 8.5 g, Rfl: 11    amiodarone (PACERONE) 200 MG tablet, Take 1 tablet by mouth 2 (Two) Times a Day With Meals., Disp: 180 tablet, Rfl: 0    empagliflozin (JARDIANCE) 10 MG tablet tablet, Take 1 tablet by mouth Daily., Disp: 90 tablet, Rfl: 0    furosemide (LASIX) 20 MG tablet, Take 1 tablet by mouth Daily With Lunch. (Patient taking differently: Take 1 tablet by mouth Daily With Lunch. Every evening), Disp: 90 tablet, Rfl: 3    furosemide (LASIX) 40 MG tablet, Take 1 tablet by mouth Daily. (Patient taking differently: Take 1 tablet by mouth Every Morning.), Disp: 90 tablet, Rfl: 3    metoprolol succinate XL (TOPROL-XL) 25 MG 24 hr tablet, Take 1 tablet by mouth Daily., Disp: 90 tablet, Rfl: 3    pantoprazole (PROTONIX) 40 MG EC tablet, TAKE 1 TABLET BY MOUTH 2 TIMES A DAY, Disp: 180 tablet, Rfl: 0    potassium chloride 10 MEQ CR tablet, Take 1 tablet by mouth 2 (Two) Times a Day., Disp: 180 tablet, Rfl: 1    rivaroxaban (XARELTO) 20 MG tablet, Take 1 tablet by mouth Daily With Dinner. Indications: Atrial  "Fibrillation, Disp: 90 tablet, Rfl: 1    sacubitril-valsartan (ENTRESTO) 24-26 MG tablet, Take 1 tablet by mouth Every 12 (Twelve) Hours., Disp: 180 tablet, Rfl: 0    zolpidem (Ambien) 5 MG tablet, Take 1 tablet by mouth At Night As Needed for Sleep., Disp: 30 tablet, Rfl: 0      Objective:     Vitals:    07/17/25 0910   BP: 134/90   BP Location: Left arm   Patient Position: Sitting   Cuff Size: Large Adult   Pulse: 56   Weight: (!) 141 kg (311 lb)   Height: 180.3 cm (71\")     Body mass index is 43.38 kg/m².    PHYSICAL EXAM:    Vitals and nursing note reviewed.   Constitutional:       General: Not in acute distress.  Pulmonary:      Effort: Pulmonary effort is normal. No respiratory distress.   Cardiovascular:      Normal rate. Regular rhythm.   Edema:     Peripheral edema absent.   Skin:     General: Skin is warm and dry.   Neurological:      Mental Status: Alert and oriented to person, place, and time.   Psychiatric:         Behavior: Behavior normal.         Thought Content: Thought content normal.         Judgment: Judgment normal.             ECG 12 Lead    Date/Time: 7/17/2025 10:14 AM  Performed by: Jim Cornelius MD    Authorized by: Jim Cornelius MD  Comparison: compared with previous ECG from 6/17/2025  Rhythm: sinus bradycardia            Assessment:       Diagnosis Plan   1. Persistent atrial fibrillation        2. Nonsustained ventricular tachycardia        3. Class 3 severe obesity with serious comorbidity and body mass index (BMI) of 40.0 to 44.9 in adult        4. Cardiomyopathy, dilated               Plan:       We discussed ablation on August 8.  We discussed using POA, and the risk including the risk of stroke.    Her goal would be to get off amiodarone after the ablation.      We would reevaluate EF to see if this was possibly tachycardia induced cardiomyopathy, and consider how that might impact his medical therapy.    We discussed the risk of ablation, including the risk of " stroke, need to stay on anticoagulation    He is going to start GLP-1 therapy following the ablation for weight loss    As always, it has been a pleasure to participate in your patient's care.      Sincerely,         Jim Cornelius MD   No

## 2025-07-24 ENCOUNTER — LAB (OUTPATIENT)
Dept: FAMILY MEDICINE CLINIC | Facility: CLINIC | Age: 55
End: 2025-07-24
Payer: COMMERCIAL

## 2025-07-24 ENCOUNTER — OFFICE VISIT (OUTPATIENT)
Dept: FAMILY MEDICINE CLINIC | Facility: CLINIC | Age: 55
End: 2025-07-24
Payer: COMMERCIAL

## 2025-07-24 VITALS
HEART RATE: 75 BPM | BODY MASS INDEX: 43.26 KG/M2 | WEIGHT: 309 LBS | DIASTOLIC BLOOD PRESSURE: 78 MMHG | OXYGEN SATURATION: 96 % | SYSTOLIC BLOOD PRESSURE: 122 MMHG | HEIGHT: 71 IN

## 2025-07-24 DIAGNOSIS — G47.00 INSOMNIA, UNSPECIFIED TYPE: ICD-10-CM

## 2025-07-24 DIAGNOSIS — Z00.00 PREVENTATIVE HEALTH CARE: ICD-10-CM

## 2025-07-24 DIAGNOSIS — I10 ESSENTIAL HYPERTENSION: ICD-10-CM

## 2025-07-24 DIAGNOSIS — I48.91 ATRIAL FIBRILLATION, UNSPECIFIED TYPE: Primary | ICD-10-CM

## 2025-07-24 DIAGNOSIS — R79.89 ELEVATED BRAIN NATRIURETIC PEPTIDE (BNP) LEVEL: ICD-10-CM

## 2025-07-24 DIAGNOSIS — E66.813 CLASS 3 SEVERE OBESITY WITH SERIOUS COMORBIDITY AND BODY MASS INDEX (BMI) OF 40.0 TO 44.9 IN ADULT: ICD-10-CM

## 2025-07-24 DIAGNOSIS — E78.2 MIXED HYPERLIPIDEMIA: ICD-10-CM

## 2025-07-24 LAB
DEPRECATED RDW RBC AUTO: 45.6 FL (ref 37–54)
ERYTHROCYTE [DISTWIDTH] IN BLOOD BY AUTOMATED COUNT: 15.7 % (ref 12.3–15.4)
HCT VFR BLD AUTO: 53.5 % (ref 37.5–51)
HGB BLD-MCNC: 17 G/DL (ref 13–17.7)
MCH RBC QN AUTO: 27 PG (ref 26.6–33)
MCHC RBC AUTO-ENTMCNC: 31.8 G/DL (ref 31.5–35.7)
MCV RBC AUTO: 85.1 FL (ref 79–97)
PLATELET # BLD AUTO: 263 10*3/MM3 (ref 140–450)
PMV BLD AUTO: 10.2 FL (ref 6–12)
RBC # BLD AUTO: 6.29 10*6/MM3 (ref 4.14–5.8)
WBC NRBC COR # BLD AUTO: 8.47 10*3/MM3 (ref 3.4–10.8)

## 2025-07-24 PROCEDURE — 36415 COLL VENOUS BLD VENIPUNCTURE: CPT | Performed by: NURSE PRACTITIONER

## 2025-07-24 PROCEDURE — 83880 ASSAY OF NATRIURETIC PEPTIDE: CPT | Performed by: NURSE PRACTITIONER

## 2025-07-24 PROCEDURE — 80053 COMPREHEN METABOLIC PANEL: CPT | Performed by: NURSE PRACTITIONER

## 2025-07-24 PROCEDURE — 80061 LIPID PANEL: CPT | Performed by: NURSE PRACTITIONER

## 2025-07-24 PROCEDURE — 85027 COMPLETE CBC AUTOMATED: CPT | Performed by: NURSE PRACTITIONER

## 2025-07-24 PROCEDURE — 83036 HEMOGLOBIN GLYCOSYLATED A1C: CPT | Performed by: NURSE PRACTITIONER

## 2025-07-24 NOTE — PROGRESS NOTES
"Chief Complaint  Follow-up (Follow up HTN ), Insomnia (Trouble falling asleep and staying asleep ), and Obesity (Discuss something for weight loss )    Subjective        Jaime Lyle Jr. presents to [facility]  History of Present Illness    Patient presents for follow-up visit.    Atrial fibrillation, prescribed amiodarone 200 mg twice daily and Xarelto 20 mg daily.  He is also taking Jardiance 10 mg daily and Entresto as prescribed per Cardiology.  Hypertension is stable on metoprolol XL 25 mg daily. Cardiac ablation scheduled for August 8.     Patient started on Ambien 5 mg nightly as needed for insomnia.  He is still having difficulty falling and staying asleep. Patient reports takes 4 hours of sleep to fall asleep, only sleeps 2-3 hours.     Obesity, current BMI is 43.10.  Patient wants to discuss weight loss options.  Goal weight is 225 pounds. Patient is exercising when able - limited due to knee pain. He was taking Mobic for knee pain - unable to since adding Xarelto. Patient is working on a well balanced diet. He is wanting to discuss Zepbound.     Objective   Vital Signs:  /78 (BP Location: Left arm, Patient Position: Sitting, Cuff Size: Large Adult)   Pulse 75   Ht 180.3 cm (71\")   Wt (!) 140 kg (309 lb)   SpO2 96%   BMI 43.10 kg/m²   Estimated body mass index is 43.1 kg/m² as calculated from the following:    Height as of this encounter: 180.3 cm (71\").    Weight as of this encounter: 140 kg (309 lb).          Physical Exam  Constitutional:       Appearance: Normal appearance. He is obese.   HENT:      Head: Normocephalic.   Cardiovascular:      Rate and Rhythm: Normal rate and regular rhythm.   Pulmonary:      Effort: Pulmonary effort is normal.      Breath sounds: Normal breath sounds.   Abdominal:      General: Abdomen is flat. Bowel sounds are normal.      Palpations: Abdomen is soft.   Musculoskeletal:         General: Normal range of motion.      " Cervical back: Neck supple.      Right lower leg: No edema.      Left lower leg: No edema.   Skin:     General: Skin is warm and dry.   Neurological:      Mental Status: He is alert and oriented to person, place, and time.      Gait: Gait is intact.   Psychiatric:         Attention and Perception: Attention normal.         Mood and Affect: Mood normal.         Speech: Speech normal.        Result Review :    CMP          6/14/2025    18:26 6/15/2025    02:55 6/25/2025    09:21   CMP   Glucose  111  100    BUN  14.0  17.0    Creatinine  1.01  1.24    EGFR  87.3  68.2    Sodium  138  139    Potassium 4.2  4.3  4.4    Chloride  105  104    Calcium  8.5  9.4    BUN/Creatinine Ratio  13.9  13.7    Anion Gap  11.0  7.9      CBC          5/29/2025    11:49 6/10/2025    09:32 6/12/2025    14:32   CBC   WBC 9.83  8.95  9.88    RBC 6.08  5.99  6.00    Hemoglobin 16.8  16.3  16.5    Hematocrit 53.6  51.8  49.8    MCV 88.2  86.5  83.0    MCH 27.6  27.2  27.5    MCHC 31.3  31.5  33.1    RDW 14.0  13.9  13.4    Platelets 257  258  257      Lipid Panel          1/24/2025    13:13   Lipid Panel   Total Cholesterol 205    Triglycerides 287    HDL Cholesterol 37    VLDL Cholesterol 50    LDL Cholesterol  118    LDL/HDL Ratio 2.99      TSH          9/11/2024    10:13 1/24/2025    13:13 4/28/2025    20:14   TSH   TSH 1.160  1.720  2.820      Most Recent A1C          1/24/2025    13:13   HGBA1C Most Recent   Hemoglobin A1C 5.40                Assessment and Plan   Diagnoses and all orders for this visit:    1. Atrial fibrillation, unspecified type (Primary)  Assessment & Plan:  Continue treatment regimen  Plans for cardiac ablation on 8/8    Orders:  -     BNP    2. Essential hypertension  Assessment & Plan:  Hypertension is stable and controlled  Continue current treatment regimen.  Regular aerobic exercise.  Ambulatory blood pressure monitoring.  Blood pressure will be reassessed in 3 months.    Orders:  -     CBC (No Diff)  -      Comprehensive Metabolic Panel  -     BNP    3. Insomnia, unspecified type  Assessment & Plan:  Increase Ambien to 10 mg nightly over the next few nights and send update      4. Elevated brain natriuretic peptide (BNP) level  -     BNP    5. Mixed hyperlipidemia  -     Lipid Panel    6. Preventative health care  -     Hemoglobin A1c    7. Class 3 severe obesity with serious comorbidity and body mass index (BMI) of 40.0 to 44.9 in adult  Assessment & Plan:  Patient's (Body mass index is 43.1 kg/m².) indicates that they are morbidly/severely obese (BMI > 40 or > 35 with obesity - related health condition) with health conditions that include obstructive sleep apnea, hypertension, and dyslipidemias . Weight is unchanged. BMI  is above average; BMI management plan is completed. We discussed portion control, increasing exercise, joining a fitness center or start home based exercise program, Weight Watchers or other Commercial based weight reduction program, management of depression/anxiety/stress to control compensatory eating, decreasing alcohol consumption, and pharmacologic options including Zepbound 2.5 mg weekly.     Orders:  -     Tirzepatide-Weight Management (ZEPBOUND) 2.5 MG/0.5ML solution auto-injector; Inject 0.5 mL under the skin into the appropriate area as directed 1 (One) Time Per Week for 30 days. Indications: OBESITY, Obstructive Sleep Apnea Syndrome  Dispense: 2 mL; Refill: 0    Other orders  -     Discontinue: Tirzepatide-Weight Management (ZEPBOUND) 2.5 MG/0.5ML solution auto-injector; Inject 0.5 mL under the skin into the appropriate area as directed 1 (One) Time Per Week for 30 days.  Dispense: 2 mL; Refill: 0           I spent 30 minutes caring for Jaime on this date of service. This time includes time spent by me in the following activities:preparing for the visit, reviewing tests, obtaining and/or reviewing a separately obtained history, performing a medically appropriate examination and/or  evaluation , counseling and educating the patient/family/caregiver, ordering medications, tests, or procedures, documenting information in the medical record, and care coordination  Follow Up   Return in about 8 weeks (around 9/18/2025) for Obesity.  Patient was given instructions and counseling regarding his condition or for health maintenance advice. Please see specific information pulled into the AVS if appropriate.

## 2025-07-24 NOTE — ASSESSMENT & PLAN NOTE
Patient's (Body mass index is 43.1 kg/m².) indicates that they are morbidly/severely obese (BMI > 40 or > 35 with obesity - related health condition) with health conditions that include obstructive sleep apnea, hypertension, and dyslipidemias . Weight is unchanged. BMI  is above average; BMI management plan is completed. We discussed portion control, increasing exercise, joining a fitness center or start home based exercise program, Weight Watchers or other Commercial based weight reduction program, management of depression/anxiety/stress to control compensatory eating, decreasing alcohol consumption, and pharmacologic options including Zepbound 2.5 mg weekly.

## 2025-07-25 ENCOUNTER — HOSPITAL ENCOUNTER (OUTPATIENT)
Dept: CARDIOLOGY | Facility: HOSPITAL | Age: 55
Discharge: HOME | End: 2025-07-25
Admitting: INTERNAL MEDICINE
Payer: COMMERCIAL

## 2025-07-25 ENCOUNTER — RESULTS FOLLOW-UP (OUTPATIENT)
Dept: FAMILY MEDICINE CLINIC | Facility: CLINIC | Age: 55
End: 2025-07-25
Payer: COMMERCIAL

## 2025-07-25 ENCOUNTER — RESULTS FOLLOW-UP (OUTPATIENT)
Dept: CARDIOLOGY | Age: 55
End: 2025-07-25
Payer: COMMERCIAL

## 2025-07-25 VITALS
WEIGHT: 309 LBS | HEIGHT: 71 IN | OXYGEN SATURATION: 95 % | SYSTOLIC BLOOD PRESSURE: 140 MMHG | DIASTOLIC BLOOD PRESSURE: 72 MMHG | HEART RATE: 56 BPM | BODY MASS INDEX: 43.26 KG/M2

## 2025-07-25 DIAGNOSIS — I42.8 NON-ISCHEMIC CARDIOMYOPATHY: ICD-10-CM

## 2025-07-25 DIAGNOSIS — I50.31 ACUTE HEART FAILURE WITH PRESERVED EJECTION FRACTION: ICD-10-CM

## 2025-07-25 LAB
ALBUMIN SERPL-MCNC: 4.2 G/DL (ref 3.5–5.2)
ALBUMIN/GLOB SERPL: 1.4 G/DL
ALP SERPL-CCNC: 74 U/L (ref 39–117)
ALT SERPL W P-5'-P-CCNC: 24 U/L (ref 1–41)
ANION GAP SERPL CALCULATED.3IONS-SCNC: 11.4 MMOL/L (ref 5–15)
AST SERPL-CCNC: 17 U/L (ref 1–40)
BH CV ECHO MEAS - EDV(CUBED): 271.3 ML
BH CV ECHO MEAS - EDV(MOD-SP2): 174 ML
BH CV ECHO MEAS - EDV(MOD-SP4): 150 ML
BH CV ECHO MEAS - EF(MOD-SP2): 43.7 %
BH CV ECHO MEAS - EF(MOD-SP4): 45.3 %
BH CV ECHO MEAS - ESV(CUBED): 125.1 ML
BH CV ECHO MEAS - ESV(MOD-SP2): 98 ML
BH CV ECHO MEAS - ESV(MOD-SP4): 82 ML
BH CV ECHO MEAS - FS: 22.7 %
BH CV ECHO MEAS - IVS/LVPW: 0.94 CM
BH CV ECHO MEAS - IVSD: 1.05 CM
BH CV ECHO MEAS - LAT PEAK E' VEL: 6.9 CM/SEC
BH CV ECHO MEAS - LV DIASTOLIC VOL/BSA (35-75): 59.1 CM2
BH CV ECHO MEAS - LV MASS(C)D: 312.7 GRAMS
BH CV ECHO MEAS - LV SYSTOLIC VOL/BSA (12-30): 32.3 CM2
BH CV ECHO MEAS - LVIDD: 6.5 CM
BH CV ECHO MEAS - LVIDS: 5 CM
BH CV ECHO MEAS - LVPWD: 1.12 CM
BH CV ECHO MEAS - MED PEAK E' VEL: 5.4 CM/SEC
BH CV ECHO MEAS - MV A MAX VEL: 62.6 CM/SEC
BH CV ECHO MEAS - MV DEC SLOPE: 330.9 CM/SEC2
BH CV ECHO MEAS - MV DEC TIME: 0.22 SEC
BH CV ECHO MEAS - MV E MAX VEL: 59.7 CM/SEC
BH CV ECHO MEAS - MV E/A: 0.95
BH CV ECHO MEAS - MV MAX PG: 3.4 MMHG
BH CV ECHO MEAS - MV MEAN PG: 1.26 MMHG
BH CV ECHO MEAS - MV P1/2T: 69.4 MSEC
BH CV ECHO MEAS - MV V2 VTI: 34.6 CM
BH CV ECHO MEAS - MVA(P1/2T): 3.2 CM2
BH CV ECHO MEAS - RAP SYSTOLE: 3 MMHG
BH CV ECHO MEAS - SV(MOD-SP2): 76 ML
BH CV ECHO MEAS - SV(MOD-SP4): 68 ML
BH CV ECHO MEAS - SVI(MOD-SP2): 30 ML/M2
BH CV ECHO MEAS - SVI(MOD-SP4): 26.8 ML/M2
BH CV ECHO MEASUREMENTS AVERAGE E/E' RATIO: 9.71
BH CV XLRA - TDI S': 11.5 CM/SEC
BILIRUB SERPL-MCNC: 0.7 MG/DL (ref 0–1.2)
BUN SERPL-MCNC: 16 MG/DL (ref 6–20)
BUN/CREAT SERPL: 17.6 (ref 7–25)
CALCIUM SPEC-SCNC: 9.4 MG/DL (ref 8.6–10.5)
CHLORIDE SERPL-SCNC: 107 MMOL/L (ref 98–107)
CHOLEST SERPL-MCNC: 180 MG/DL (ref 0–200)
CO2 SERPL-SCNC: 22.6 MMOL/L (ref 22–29)
CREAT SERPL-MCNC: 0.91 MG/DL (ref 0.76–1.27)
EGFRCR SERPLBLD CKD-EPI 2021: 98.9 ML/MIN/1.73
GLOBULIN UR ELPH-MCNC: 3 GM/DL
GLUCOSE SERPL-MCNC: 86 MG/DL (ref 65–99)
HBA1C MFR BLD: 5.6 % (ref 4.8–5.6)
HDLC SERPL-MCNC: 37 MG/DL (ref 40–60)
LDLC SERPL CALC-MCNC: 109 MG/DL (ref 0–100)
LDLC/HDLC SERPL: 2.83 {RATIO}
LEFT ATRIUM VOLUME INDEX: 18.5 ML/M2
LV EF BIPLANE MOD: 46.2 %
NT-PROBNP SERPL-MCNC: 379 PG/ML (ref 0–900)
POTASSIUM SERPL-SCNC: 4.3 MMOL/L (ref 3.5–5.2)
PROT SERPL-MCNC: 7.2 G/DL (ref 6–8.5)
SODIUM SERPL-SCNC: 141 MMOL/L (ref 136–145)
TRIGL SERPL-MCNC: 192 MG/DL (ref 0–150)
VLDLC SERPL-MCNC: 34 MG/DL (ref 5–40)

## 2025-07-25 PROCEDURE — 93308 TTE F-UP OR LMTD: CPT

## 2025-07-25 PROCEDURE — 25510000001 PERFLUTREN 6.52 MG/ML SUSPENSION 2 ML VIAL: Performed by: INTERNAL MEDICINE

## 2025-07-25 PROCEDURE — 93356 MYOCRD STRAIN IMG SPCKL TRCK: CPT

## 2025-07-25 PROCEDURE — 93325 DOPPLER ECHO COLOR FLOW MAPG: CPT

## 2025-07-25 PROCEDURE — 93321 DOPPLER ECHO F-UP/LMTD STD: CPT

## 2025-07-25 RX ADMIN — PERFLUTREN 2 ML: 6.52 INJECTION, SUSPENSION INTRAVENOUS at 08:18

## 2025-07-25 NOTE — TELEPHONE ENCOUNTER
Reviewed results and recommendations with Jaime Lyle Jr..  Patient verbalized understanding of results and recommendations.    Thank you,  Karine PLASCENCIA RN  Triage Nurse CONRADO  07/25/25    10:29 EDT

## 2025-07-25 NOTE — TELEPHONE ENCOUNTER
Please let him know that limited echo done today showed improvement in LVEF to 46.2% (was 36.5% last month).  Keep upcoming appointment with Dr. Conti.

## 2025-07-29 ENCOUNTER — OFFICE VISIT (OUTPATIENT)
Dept: CARDIOLOGY | Age: 55
End: 2025-07-29
Payer: COMMERCIAL

## 2025-07-29 VITALS
DIASTOLIC BLOOD PRESSURE: 78 MMHG | BODY MASS INDEX: 43.4 KG/M2 | HEART RATE: 59 BPM | HEIGHT: 71 IN | SYSTOLIC BLOOD PRESSURE: 110 MMHG | WEIGHT: 310 LBS

## 2025-07-29 DIAGNOSIS — G47.33 OSA (OBSTRUCTIVE SLEEP APNEA): ICD-10-CM

## 2025-07-29 DIAGNOSIS — I48.91 ATRIAL FIBRILLATION WITH RVR: ICD-10-CM

## 2025-07-29 DIAGNOSIS — I47.29 NONSUSTAINED VENTRICULAR TACHYCARDIA: Primary | ICD-10-CM

## 2025-07-29 DIAGNOSIS — R79.89 ELEVATED BRAIN NATRIURETIC PEPTIDE (BNP) LEVEL: ICD-10-CM

## 2025-07-29 DIAGNOSIS — I10 ESSENTIAL HYPERTENSION: ICD-10-CM

## 2025-07-29 DIAGNOSIS — I42.0 CARDIOMYOPATHY, DILATED: ICD-10-CM

## 2025-07-29 PROCEDURE — 93000 ELECTROCARDIOGRAM COMPLETE: CPT | Performed by: INTERNAL MEDICINE

## 2025-07-29 PROCEDURE — 99213 OFFICE O/P EST LOW 20 MIN: CPT | Performed by: INTERNAL MEDICINE

## 2025-07-30 DIAGNOSIS — G47.00 INSOMNIA, UNSPECIFIED TYPE: ICD-10-CM

## 2025-07-30 RX ORDER — ZOLPIDEM TARTRATE 10 MG/1
10 TABLET ORAL NIGHTLY PRN
Qty: 30 TABLET | Refills: 1 | Status: SHIPPED | OUTPATIENT
Start: 2025-07-30 | End: 2025-08-29

## 2025-08-05 ENCOUNTER — TELEPHONE (OUTPATIENT)
Dept: CARDIOLOGY | Age: 55
End: 2025-08-05
Payer: COMMERCIAL

## 2025-08-05 ENCOUNTER — LAB (OUTPATIENT)
Dept: LAB | Facility: HOSPITAL | Age: 55
End: 2025-08-05
Payer: COMMERCIAL

## 2025-08-05 DIAGNOSIS — I48.91 ATRIAL FIBRILLATION WITH RVR: ICD-10-CM

## 2025-08-05 LAB
ANION GAP SERPL CALCULATED.3IONS-SCNC: 11 MMOL/L (ref 5–15)
BUN SERPL-MCNC: 14 MG/DL (ref 6–20)
BUN/CREAT SERPL: 14.3 (ref 7–25)
CALCIUM SPEC-SCNC: 9.2 MG/DL (ref 8.6–10.5)
CHLORIDE SERPL-SCNC: 107 MMOL/L (ref 98–107)
CO2 SERPL-SCNC: 23 MMOL/L (ref 22–29)
CREAT SERPL-MCNC: 0.98 MG/DL (ref 0.76–1.27)
DEPRECATED RDW RBC AUTO: 43.3 FL (ref 37–54)
EGFRCR SERPLBLD CKD-EPI 2021: 90.5 ML/MIN/1.73
ERYTHROCYTE [DISTWIDTH] IN BLOOD BY AUTOMATED COUNT: 14.4 % (ref 12.3–15.4)
GLUCOSE SERPL-MCNC: 93 MG/DL (ref 65–99)
HCT VFR BLD AUTO: 51.9 % (ref 37.5–51)
HGB BLD-MCNC: 16.7 G/DL (ref 13–17.7)
MCH RBC QN AUTO: 27 PG (ref 26.6–33)
MCHC RBC AUTO-ENTMCNC: 32.2 G/DL (ref 31.5–35.7)
MCV RBC AUTO: 83.8 FL (ref 79–97)
PLATELET # BLD AUTO: 239 10*3/MM3 (ref 140–450)
PMV BLD AUTO: 9.3 FL (ref 6–12)
POTASSIUM SERPL-SCNC: 4.1 MMOL/L (ref 3.5–5.2)
RBC # BLD AUTO: 6.19 10*6/MM3 (ref 4.14–5.8)
SODIUM SERPL-SCNC: 141 MMOL/L (ref 136–145)
WBC NRBC COR # BLD AUTO: 7.33 10*3/MM3 (ref 3.4–10.8)

## 2025-08-05 PROCEDURE — 80048 BASIC METABOLIC PNL TOTAL CA: CPT

## 2025-08-05 PROCEDURE — 85027 COMPLETE CBC AUTOMATED: CPT

## 2025-08-05 PROCEDURE — 36415 COLL VENOUS BLD VENIPUNCTURE: CPT

## 2025-08-07 ENCOUNTER — TELEPHONE (OUTPATIENT)
Age: 55
End: 2025-08-07
Payer: COMMERCIAL

## 2025-08-08 ENCOUNTER — ANESTHESIA (OUTPATIENT)
Dept: CARDIOLOGY | Facility: HOSPITAL | Age: 55
End: 2025-08-08
Payer: COMMERCIAL

## 2025-08-08 ENCOUNTER — ANESTHESIA EVENT (OUTPATIENT)
Dept: CARDIOLOGY | Facility: HOSPITAL | Age: 55
End: 2025-08-08
Payer: COMMERCIAL

## 2025-08-08 ENCOUNTER — HOSPITAL ENCOUNTER (OUTPATIENT)
Facility: HOSPITAL | Age: 55
Setting detail: HOSPITAL OUTPATIENT SURGERY
Discharge: HOME | End: 2025-08-08
Attending: INTERNAL MEDICINE | Admitting: INTERNAL MEDICINE
Payer: COMMERCIAL

## 2025-08-08 VITALS
HEART RATE: 52 BPM | SYSTOLIC BLOOD PRESSURE: 115 MMHG | TEMPERATURE: 97.9 F | DIASTOLIC BLOOD PRESSURE: 74 MMHG | HEIGHT: 72 IN | OXYGEN SATURATION: 95 % | RESPIRATION RATE: 18 BRPM | WEIGHT: 314 LBS | BODY MASS INDEX: 42.53 KG/M2

## 2025-08-08 DIAGNOSIS — I48.91 ATRIAL FIBRILLATION WITH RVR: ICD-10-CM

## 2025-08-08 LAB
ACT BLD: 354 SECONDS (ref 82–152)
ACT BLD: 366 SECONDS (ref 82–152)
QT INTERVAL: 462 MS
QTC INTERVAL: 438 MS
QTC INTERVAL: NORMAL MS

## 2025-08-08 PROCEDURE — 25010000002 ONDANSETRON PER 1 MG: Performed by: NURSE ANESTHETIST, CERTIFIED REGISTERED

## 2025-08-08 PROCEDURE — 93655 ICAR CATH ABLTJ DSCRT ARRHYT: CPT | Performed by: INTERNAL MEDICINE

## 2025-08-08 PROCEDURE — C1760 CLOSURE DEV, VASC: HCPCS | Performed by: INTERNAL MEDICINE

## 2025-08-08 PROCEDURE — C1759 CATH, INTRA ECHOCARDIOGRAPHY: HCPCS | Performed by: INTERNAL MEDICINE

## 2025-08-08 PROCEDURE — 85347 COAGULATION TIME ACTIVATED: CPT

## 2025-08-08 PROCEDURE — 25010000002 HEPARIN (PORCINE) PER 1000 UNITS: Performed by: INTERNAL MEDICINE

## 2025-08-08 PROCEDURE — 25010000002 DEXAMETHASONE SODIUM PHOSPHATE 20 MG/5ML SOLUTION: Performed by: NURSE ANESTHETIST, CERTIFIED REGISTERED

## 2025-08-08 PROCEDURE — C1894 INTRO/SHEATH, NON-LASER: HCPCS | Performed by: INTERNAL MEDICINE

## 2025-08-08 PROCEDURE — 93656 COMPRE EP EVAL ABLTJ ATR FIB: CPT | Performed by: INTERNAL MEDICINE

## 2025-08-08 PROCEDURE — 93657 TX L/R ATRIAL FIB ADDL: CPT | Performed by: INTERNAL MEDICINE

## 2025-08-08 PROCEDURE — 25010000002 SUGAMMADEX 200 MG/2ML SOLUTION: Performed by: NURSE ANESTHETIST, CERTIFIED REGISTERED

## 2025-08-08 PROCEDURE — 25010000002 GLYCOPYRROLATE 0.2 MG/ML SOLUTION: Performed by: NURSE ANESTHETIST, CERTIFIED REGISTERED

## 2025-08-08 PROCEDURE — 25810000003 SODIUM CHLORIDE 0.9 % SOLUTION: Performed by: INTERNAL MEDICINE

## 2025-08-08 PROCEDURE — C1713 ANCHOR/SCREW BN/BN,TIS/BN: HCPCS | Performed by: INTERNAL MEDICINE

## 2025-08-08 PROCEDURE — 25010000002 PROTAMINE SULFATE PER 10 MG: Performed by: INTERNAL MEDICINE

## 2025-08-08 PROCEDURE — 25010000002 LIDOCAINE 2% SOLUTION: Performed by: NURSE ANESTHETIST, CERTIFIED REGISTERED

## 2025-08-08 PROCEDURE — 25010000002 LIDOCAINE 1% - EPINEPHRINE 1:100000 1 %-1:100000 SOLUTION: Performed by: INTERNAL MEDICINE

## 2025-08-08 PROCEDURE — 25010000002 PROPOFOL 10 MG/ML EMULSION: Performed by: NURSE ANESTHETIST, CERTIFIED REGISTERED

## 2025-08-08 PROCEDURE — 93005 ELECTROCARDIOGRAM TRACING: CPT | Performed by: INTERNAL MEDICINE

## 2025-08-08 PROCEDURE — C1730 CATH, EP, 19 OR FEW ELECT: HCPCS | Performed by: INTERNAL MEDICINE

## 2025-08-08 PROCEDURE — C1893 INTRO/SHEATH, FIXED,NON-PEEL: HCPCS | Performed by: INTERNAL MEDICINE

## 2025-08-08 PROCEDURE — C1769 GUIDE WIRE: HCPCS | Performed by: INTERNAL MEDICINE

## 2025-08-08 RX ORDER — ACETAMINOPHEN 650 MG/1
650 SUPPOSITORY RECTAL EVERY 4 HOURS PRN
Status: DISCONTINUED | OUTPATIENT
Start: 2025-08-08 | End: 2025-08-08 | Stop reason: HOSPADM

## 2025-08-08 RX ORDER — ACETAMINOPHEN 325 MG/1
650 TABLET ORAL EVERY 4 HOURS PRN
Status: DISCONTINUED | OUTPATIENT
Start: 2025-08-08 | End: 2025-08-08 | Stop reason: HOSPADM

## 2025-08-08 RX ORDER — FLUMAZENIL 0.1 MG/ML
0.2 INJECTION INTRAVENOUS AS NEEDED
Status: DISCONTINUED | OUTPATIENT
Start: 2025-08-08 | End: 2025-08-08 | Stop reason: HOSPADM

## 2025-08-08 RX ORDER — DEXAMETHASONE SODIUM PHOSPHATE 4 MG/ML
INJECTION, SOLUTION INTRA-ARTICULAR; INTRALESIONAL; INTRAMUSCULAR; INTRAVENOUS; SOFT TISSUE AS NEEDED
Status: DISCONTINUED | OUTPATIENT
Start: 2025-08-08 | End: 2025-08-08 | Stop reason: SURG

## 2025-08-08 RX ORDER — FUROSEMIDE 20 MG/1
20 TABLET ORAL
Start: 2025-08-08 | End: 2025-08-09 | Stop reason: SDUPTHER

## 2025-08-08 RX ORDER — PROTAMINE SULFATE 10 MG/ML
INJECTION, SOLUTION INTRAVENOUS
Status: DISCONTINUED | OUTPATIENT
Start: 2025-08-08 | End: 2025-08-08 | Stop reason: HOSPADM

## 2025-08-08 RX ORDER — LIDOCAINE HYDROCHLORIDE AND EPINEPHRINE 10; 10 MG/ML; UG/ML
INJECTION, SOLUTION INFILTRATION; PERINEURAL
Status: DISCONTINUED | OUTPATIENT
Start: 2025-08-08 | End: 2025-08-08 | Stop reason: HOSPADM

## 2025-08-08 RX ORDER — FENTANYL CITRATE 50 UG/ML
25 INJECTION, SOLUTION INTRAMUSCULAR; INTRAVENOUS
Status: DISCONTINUED | OUTPATIENT
Start: 2025-08-08 | End: 2025-08-08 | Stop reason: HOSPADM

## 2025-08-08 RX ORDER — DIPHENHYDRAMINE HYDROCHLORIDE 50 MG/ML
12.5 INJECTION, SOLUTION INTRAMUSCULAR; INTRAVENOUS
Status: DISCONTINUED | OUTPATIENT
Start: 2025-08-08 | End: 2025-08-08 | Stop reason: HOSPADM

## 2025-08-08 RX ORDER — NALOXONE HCL 0.4 MG/ML
0.4 VIAL (ML) INJECTION
Status: DISCONTINUED | OUTPATIENT
Start: 2025-08-08 | End: 2025-08-08 | Stop reason: HOSPADM

## 2025-08-08 RX ORDER — HYDROMORPHONE HYDROCHLORIDE 1 MG/ML
0.5 INJECTION, SOLUTION INTRAMUSCULAR; INTRAVENOUS; SUBCUTANEOUS
Status: DISCONTINUED | OUTPATIENT
Start: 2025-08-08 | End: 2025-08-08 | Stop reason: HOSPADM

## 2025-08-08 RX ORDER — PROMETHAZINE HYDROCHLORIDE 25 MG/1
25 TABLET ORAL ONCE AS NEEDED
Status: DISCONTINUED | OUTPATIENT
Start: 2025-08-08 | End: 2025-08-08 | Stop reason: HOSPADM

## 2025-08-08 RX ORDER — ONDANSETRON 2 MG/ML
4 INJECTION INTRAMUSCULAR; INTRAVENOUS ONCE AS NEEDED
Status: DISCONTINUED | OUTPATIENT
Start: 2025-08-08 | End: 2025-08-08 | Stop reason: HOSPADM

## 2025-08-08 RX ORDER — SODIUM CHLORIDE 0.9 % (FLUSH) 0.9 %
10 SYRINGE (ML) INJECTION EVERY 12 HOURS SCHEDULED
Status: DISCONTINUED | OUTPATIENT
Start: 2025-08-08 | End: 2025-08-08 | Stop reason: HOSPADM

## 2025-08-08 RX ORDER — NALOXONE HCL 0.4 MG/ML
0.2 VIAL (ML) INJECTION AS NEEDED
Status: DISCONTINUED | OUTPATIENT
Start: 2025-08-08 | End: 2025-08-08 | Stop reason: HOSPADM

## 2025-08-08 RX ORDER — HEPARIN SODIUM 1000 [USP'U]/ML
INJECTION, SOLUTION INTRAVENOUS; SUBCUTANEOUS
Status: DISCONTINUED | OUTPATIENT
Start: 2025-08-08 | End: 2025-08-08 | Stop reason: HOSPADM

## 2025-08-08 RX ORDER — ONDANSETRON 2 MG/ML
INJECTION INTRAMUSCULAR; INTRAVENOUS AS NEEDED
Status: DISCONTINUED | OUTPATIENT
Start: 2025-08-08 | End: 2025-08-08 | Stop reason: SURG

## 2025-08-08 RX ORDER — HYDROCODONE BITARTRATE AND ACETAMINOPHEN 5; 325 MG/1; MG/1
1 TABLET ORAL ONCE AS NEEDED
Status: DISCONTINUED | OUTPATIENT
Start: 2025-08-08 | End: 2025-08-08 | Stop reason: HOSPADM

## 2025-08-08 RX ORDER — PROPOFOL 10 MG/ML
VIAL (ML) INTRAVENOUS AS NEEDED
Status: DISCONTINUED | OUTPATIENT
Start: 2025-08-08 | End: 2025-08-08 | Stop reason: SURG

## 2025-08-08 RX ORDER — SODIUM CHLORIDE 0.9 % (FLUSH) 0.9 %
10 SYRINGE (ML) INJECTION AS NEEDED
Status: DISCONTINUED | OUTPATIENT
Start: 2025-08-08 | End: 2025-08-08 | Stop reason: HOSPADM

## 2025-08-08 RX ORDER — SODIUM CHLORIDE 9 MG/ML
50 INJECTION, SOLUTION INTRAVENOUS CONTINUOUS
Status: ACTIVE | OUTPATIENT
Start: 2025-08-08 | End: 2025-08-08

## 2025-08-08 RX ORDER — LIDOCAINE HYDROCHLORIDE 20 MG/ML
INJECTION, SOLUTION INFILTRATION; PERINEURAL AS NEEDED
Status: DISCONTINUED | OUTPATIENT
Start: 2025-08-08 | End: 2025-08-08 | Stop reason: SURG

## 2025-08-08 RX ORDER — PROMETHAZINE HYDROCHLORIDE 25 MG/1
25 SUPPOSITORY RECTAL ONCE AS NEEDED
Status: DISCONTINUED | OUTPATIENT
Start: 2025-08-08 | End: 2025-08-08 | Stop reason: HOSPADM

## 2025-08-08 RX ORDER — GLYCOPYRROLATE 0.2 MG/ML
INJECTION INTRAMUSCULAR; INTRAVENOUS AS NEEDED
Status: DISCONTINUED | OUTPATIENT
Start: 2025-08-08 | End: 2025-08-08 | Stop reason: SURG

## 2025-08-08 RX ORDER — ROCURONIUM BROMIDE 10 MG/ML
INJECTION, SOLUTION INTRAVENOUS AS NEEDED
Status: DISCONTINUED | OUTPATIENT
Start: 2025-08-08 | End: 2025-08-08 | Stop reason: SURG

## 2025-08-08 RX ADMIN — GLYCOPYRROLATE 0.2 MG: 0.2 INJECTION INTRAMUSCULAR; INTRAVENOUS at 11:28

## 2025-08-08 RX ADMIN — ROCURONIUM BROMIDE 20 MG: 10 INJECTION INTRAVENOUS at 12:06

## 2025-08-08 RX ADMIN — ROCURONIUM BROMIDE 30 MG: 10 INJECTION INTRAVENOUS at 11:35

## 2025-08-08 RX ADMIN — ROCURONIUM BROMIDE 50 MG: 10 INJECTION INTRAVENOUS at 11:07

## 2025-08-08 RX ADMIN — ONDANSETRON 4 MG: 2 INJECTION INTRAMUSCULAR; INTRAVENOUS at 11:14

## 2025-08-08 RX ADMIN — PROPOFOL 150 MG: 10 INJECTION, EMULSION INTRAVENOUS at 11:07

## 2025-08-08 RX ADMIN — Medication 10 ML: at 09:14

## 2025-08-08 RX ADMIN — LIDOCAINE HYDROCHLORIDE 100 MG: 20 INJECTION, SOLUTION INFILTRATION; PERINEURAL at 11:07

## 2025-08-08 RX ADMIN — SODIUM CHLORIDE 50 ML/HR: 9 INJECTION, SOLUTION INTRAVENOUS at 09:15

## 2025-08-08 RX ADMIN — SUGAMMADEX 300 MG: 100 INJECTION, SOLUTION INTRAVENOUS at 12:29

## 2025-08-08 RX ADMIN — DEXAMETHASONE SODIUM PHOSPHATE 6 MG: 4 INJECTION, SOLUTION INTRAMUSCULAR; INTRAVENOUS at 11:14

## 2025-08-11 RX ORDER — POTASSIUM CHLORIDE 750 MG/1
10 TABLET, EXTENDED RELEASE ORAL 2 TIMES DAILY
Qty: 180 TABLET | Refills: 1 | Status: SHIPPED | OUTPATIENT
Start: 2025-08-11

## 2025-08-11 RX ORDER — FUROSEMIDE 20 MG/1
20 TABLET ORAL
Qty: 30 TABLET | Refills: 4 | Status: SHIPPED | OUTPATIENT
Start: 2025-08-11

## 2025-08-29 ENCOUNTER — E-VISIT (OUTPATIENT)
Dept: ADMINISTRATIVE | Facility: OTHER | Age: 55
End: 2025-08-29
Payer: COMMERCIAL

## 2025-08-29 ENCOUNTER — E-VISIT (OUTPATIENT)
Dept: FAMILY MEDICINE CLINIC | Facility: TELEHEALTH | Age: 55
End: 2025-08-29
Payer: COMMERCIAL

## (undated) DEVICE — DGW .035 FC J3MM 260CM TEF: Brand: EMERALD

## (undated) DEVICE — 1 X VERSACROSS STEERABLE SHEATH (INCLUDING  1 X DILATOR AND 1 X J-TIP GUIDEWIRE); 1 X VERSACROSS RF WIRE (INCLUDING 1 X CONNECTOR CABLE (SINGLE USE)); 1 X DISPERSIVE ELECTRODE: Brand: VERSACROSS STEERABLE ACCESS SOLUTION

## (undated) DEVICE — Device

## (undated) DEVICE — Device: Brand: WEBSTER CS

## (undated) DEVICE — RADIFOCUS OPTITORQUE ANGIOGRAPHIC CATHETER: Brand: OPTITORQUE

## (undated) DEVICE — CATH ULTRASND ECHO ACUNAV FOR GE VIVID1 8F 90CM

## (undated) DEVICE — KT MANIFLD CARDIAC

## (undated) DEVICE — GW AMPLATZ  XSTIFF CVD .032 3MM 180CM

## (undated) DEVICE — PERCLOSE™ PROSTYLE™ SUTURE-MEDIATED CLOSURE AND REPAIR SYSTEM: Brand: PERCLOSE™ PROSTYLE™

## (undated) DEVICE — TR BAND RADIAL ARTERY COMPRESSION DEVICE: Brand: TR BAND

## (undated) DEVICE — GLIDESHEATH SLENDER STAINLESS STEEL KIT: Brand: GLIDESHEATH SLENDER

## (undated) DEVICE — PINNACLE INTRODUCER SHEATH: Brand: PINNACLE

## (undated) DEVICE — LOU EP: Brand: MEDLINE INDUSTRIES, INC.

## (undated) DEVICE — PK CATH CARD 40